# Patient Record
Sex: FEMALE | Race: ASIAN | NOT HISPANIC OR LATINO | URBAN - METROPOLITAN AREA
[De-identification: names, ages, dates, MRNs, and addresses within clinical notes are randomized per-mention and may not be internally consistent; named-entity substitution may affect disease eponyms.]

---

## 2019-09-23 ENCOUNTER — INPATIENT (INPATIENT)
Facility: HOSPITAL | Age: 84
LOS: 10 days | Discharge: HOME | DRG: 280 | End: 2019-10-04
Attending: HOSPITALIST | Admitting: INTERNAL MEDICINE
Payer: MEDICAID

## 2019-09-23 VITALS
HEIGHT: 62 IN | TEMPERATURE: 99 F | RESPIRATION RATE: 24 BRPM | WEIGHT: 136.69 LBS | OXYGEN SATURATION: 97 % | DIASTOLIC BLOOD PRESSURE: 86 MMHG | SYSTOLIC BLOOD PRESSURE: 131 MMHG | HEART RATE: 123 BPM

## 2019-09-23 DIAGNOSIS — I21.4 NON-ST ELEVATION (NSTEMI) MYOCARDIAL INFARCTION: ICD-10-CM

## 2019-09-23 LAB
ALBUMIN SERPL ELPH-MCNC: 4.1 G/DL — SIGNIFICANT CHANGE UP (ref 3.3–5)
ALP SERPL-CCNC: 144 U/L — HIGH (ref 40–120)
ALT FLD-CCNC: 43 U/L — SIGNIFICANT CHANGE UP (ref 10–45)
ANION GAP SERPL CALC-SCNC: 15 MMOL/L — SIGNIFICANT CHANGE UP (ref 5–17)
APTT BLD: 72.3 SEC — HIGH (ref 27.5–36.3)
AST SERPL-CCNC: 57 U/L — HIGH (ref 10–40)
BILIRUB SERPL-MCNC: 0.4 MG/DL — SIGNIFICANT CHANGE UP (ref 0.2–1.2)
BUN SERPL-MCNC: 30 MG/DL — HIGH (ref 7–23)
CALCIUM SERPL-MCNC: 9.2 MG/DL — SIGNIFICANT CHANGE UP (ref 8.4–10.5)
CHLORIDE SERPL-SCNC: 101 MMOL/L — SIGNIFICANT CHANGE UP (ref 96–108)
CK MB BLD-MCNC: 8.7 % — HIGH (ref 0–3.5)
CK MB CFR SERPL CALC: 9 NG/ML — HIGH (ref 0–6.7)
CK SERPL-CCNC: 103 U/L — SIGNIFICANT CHANGE UP (ref 30–200)
CO2 SERPL-SCNC: 18 MMOL/L — LOW (ref 22–31)
CREAT SERPL-MCNC: 1.93 MG/DL — HIGH (ref 0.5–1.3)
GLUCOSE SERPL-MCNC: 136 MG/DL — HIGH (ref 70–99)
HCT VFR BLD CALC: 41 % — SIGNIFICANT CHANGE UP (ref 39–50)
HGB BLD-MCNC: 13 G/DL — SIGNIFICANT CHANGE UP (ref 13–17)
INR BLD: 1.04 RATIO — SIGNIFICANT CHANGE UP (ref 0.88–1.16)
LDH SERPL L TO P-CCNC: 319 U/L — HIGH (ref 50–242)
MCHC RBC-ENTMCNC: 28.2 PG — SIGNIFICANT CHANGE UP (ref 27–34)
MCHC RBC-ENTMCNC: 31.7 GM/DL — LOW (ref 32–36)
MCV RBC AUTO: 89.2 FL — SIGNIFICANT CHANGE UP (ref 80–100)
NT-PROBNP SERPL-SCNC: HIGH PG/ML (ref 0–300)
PLATELET # BLD AUTO: 239 K/UL — SIGNIFICANT CHANGE UP (ref 150–400)
POTASSIUM SERPL-MCNC: 5.1 MMOL/L — SIGNIFICANT CHANGE UP (ref 3.5–5.3)
POTASSIUM SERPL-SCNC: 5.1 MMOL/L — SIGNIFICANT CHANGE UP (ref 3.5–5.3)
PROCALCITONIN SERPL-MCNC: 0.13 NG/ML — HIGH (ref 0.02–0.1)
PROT SERPL-MCNC: 7.8 G/DL — SIGNIFICANT CHANGE UP (ref 6–8.3)
PROTHROM AB SERPL-ACNC: 11.9 SEC — SIGNIFICANT CHANGE UP (ref 10–12.9)
RBC # BLD: 4.6 M/UL — SIGNIFICANT CHANGE UP (ref 4.2–5.8)
RBC # FLD: 13.6 % — SIGNIFICANT CHANGE UP (ref 10.3–14.5)
SODIUM SERPL-SCNC: 134 MMOL/L — LOW (ref 135–145)
TROPONIN T, HIGH SENSITIVITY RESULT: 2121 NG/L — HIGH (ref 0–51)
WBC # BLD: 16.2 K/UL — HIGH (ref 3.8–10.5)
WBC # FLD AUTO: 16.2 K/UL — HIGH (ref 3.8–10.5)

## 2019-09-23 PROCEDURE — 93010 ELECTROCARDIOGRAM REPORT: CPT

## 2019-09-23 PROCEDURE — 71045 X-RAY EXAM CHEST 1 VIEW: CPT | Mod: 26

## 2019-09-23 RX ORDER — HEPARIN SODIUM 5000 [USP'U]/ML
920 INJECTION INTRAVENOUS; SUBCUTANEOUS
Qty: 25000 | Refills: 0 | Status: DISCONTINUED | OUTPATIENT
Start: 2019-09-23 | End: 2019-09-24

## 2019-09-23 RX ORDER — FUROSEMIDE 40 MG
40 TABLET ORAL
Refills: 0 | Status: DISCONTINUED | OUTPATIENT
Start: 2019-09-23 | End: 2019-09-26

## 2019-09-23 RX ORDER — METOPROLOL TARTRATE 50 MG
25 TABLET ORAL
Refills: 0 | Status: DISCONTINUED | OUTPATIENT
Start: 2019-09-23 | End: 2019-09-26

## 2019-09-23 RX ORDER — FUROSEMIDE 40 MG
40 TABLET ORAL ONCE
Refills: 0 | Status: COMPLETED | OUTPATIENT
Start: 2019-09-23 | End: 2019-09-23

## 2019-09-23 RX ORDER — HEPARIN SODIUM 5000 [USP'U]/ML
3800 INJECTION INTRAVENOUS; SUBCUTANEOUS EVERY 6 HOURS
Refills: 0 | Status: DISCONTINUED | OUTPATIENT
Start: 2019-09-23 | End: 2019-09-24

## 2019-09-23 RX ORDER — FUROSEMIDE 40 MG
20 TABLET ORAL ONCE
Refills: 0 | Status: DISCONTINUED | OUTPATIENT
Start: 2019-09-23 | End: 2019-09-23

## 2019-09-23 RX ORDER — ATORVASTATIN CALCIUM 80 MG/1
80 TABLET, FILM COATED ORAL AT BEDTIME
Refills: 0 | Status: DISCONTINUED | OUTPATIENT
Start: 2019-09-23 | End: 2019-10-04

## 2019-09-23 RX ORDER — ASPIRIN/CALCIUM CARB/MAGNESIUM 324 MG
81 TABLET ORAL DAILY
Refills: 0 | Status: DISCONTINUED | OUTPATIENT
Start: 2019-09-23 | End: 2019-10-04

## 2019-09-23 RX ADMIN — ATORVASTATIN CALCIUM 80 MILLIGRAM(S): 80 TABLET, FILM COATED ORAL at 21:20

## 2019-09-23 RX ADMIN — HEPARIN SODIUM 920 UNIT(S)/HR: 5000 INJECTION INTRAVENOUS; SUBCUTANEOUS at 19:49

## 2019-09-23 RX ADMIN — Medication 40 MILLIGRAM(S): at 17:46

## 2019-09-23 RX ADMIN — Medication 25 MILLIGRAM(S): at 21:20

## 2019-09-23 NOTE — H&P CARDIOLOGY - HISTORY OF PRESENT ILLNESS
82 y/o Afghan female (currently resides in [place]) with PMHx of HTN who presented to the ED @ [facility] on 9/22 c/o exertional and non-exertional dyspnea, orthopnea and cough x 3 weeks, worsened 1 day prior to going to the hospital.  At OSH patient CT Chest negative for PE,  EKG without acute findings, Troponin I peak 1.58 ACS ASA/Heparin administered and patient received Plavix 300mg.  Patient also with WINSTON creatinine 2.0 on 9/22 --> 1.9 on 9/23.  TTE showing severe wall motion abnormality, LVEF 20%.  She is now transferred to [facility] in setting of NSTEMI and acute systolic HF for R/L HC.  Patient speaks Faroese only,  phone service deferred.  Patients grandson Enrike Mitchell present at bedside to translate.

## 2019-09-23 NOTE — CONSULT NOTE ADULT - SUBJECTIVE AND OBJECTIVE BOX
Patient seen and evaluated at bedside    Chief Complaint: shortness of breath    HPI:      PMHx:   Essential hypertension      PSHx:   No significant past surgical history      Allergies:  No Known Allergies      Home Meds:    Current Medications:       FAMILY HISTORY:  No pertinent family history in first degree relatives      Social History:  Smoking History:  Alcohol Use:  Drug Use:    REVIEW OF SYSTEMS:  CONSTITUTIONAL: No weakness, fevers or chills  EYES/ENT: No visual changes;  No dysphagia  NECK: No pain or stiffness  RESPIRATORY: No cough, wheezing, hemoptysis; No shortness of breath  CARDIOVASCULAR: No chest pain or palpitations; No lower extremity edema  GASTROINTESTINAL: No abdominal or epigastric pain. No nausea, vomiting, or hematemesis; No diarrhea or constipation. No melena or hematochezia.  BACK: No back pain  GENITOURINARY: No dysuria, frequency or hematuria  NEUROLOGICAL: No numbness or weakness  SKIN: No itching, burning, rashes, or lesions   All other review of systems is negative unless indicated above.    Physical Exam:  T(F): 99.4 (09-23), Max: 99.4 (09-23)  HR: 123 (09-23) (123 - 123)  BP: 131/86 (09-23) (131/86 - 131/86)  RR: 24 (09-23)  SpO2: 97% (09-23)  GENERAL: No acute distress, well-developed  HEAD:  Atraumatic, Normocephalic  ENT: EOMI, PERRLA, conjunctiva and sclera clear, Neck supple, No JVD, moist mucosa  CHEST/LUNG: Clear to auscultation bilaterally; No wheeze, equal breath sounds bilaterally   BACK: No spinal tenderness  HEART: Regular rate and rhythm; No murmurs, rubs, or gallops  ABDOMEN: Soft, Nontender, Nondistended; Bowel sounds present  EXTREMITIES:  No clubbing, cyanosis, or edema  PSYCH: Nl behavior, nl affect  NEUROLOGY: AAOx3, non-focal, cranial nerves intact  SKIN: Normal color, No rashes or lesions  LINES:    Cardiovascular Diagnostic Testing:    ECG: Personally reviewed:    Echo: Personally reviewed:    Stress Testing:    Cath:    Imaging:    CXR: Personally reviewed    Labs: Personally reviewed                              For all Cardiology service contact information, go to [email or web address] and use "[email or web address]" to login. Patient seen and evaluated at bedside    Chief Complaint: shortness of breath    HPI:    82 y/o Palestinian female (currently resides [place]) with PMHx of HTN who presented to the ED @ [facility] on 9/22 c/o exertional and non-exertional dyspnea, orthopnea and cough x 3 weeks, worsened 1 day prior to going to the hospital.  At OSH patient CT Chest negative for PE,  EKG without acute findings, Troponin I peak 1.58 ACS ASA/Heparin administered and patient received Plavix 300mg.  Patient also with WINSTON creatinine 2.0 on 9/22 --> 1.9 on 9/23.  TTE showing severe wall motion abnormality, LVEF 20%.  She is now transferred to [facility] in setting of NSTEMI and acute systolic HF for R/L HC.  Patient speaks Albanian only,  phone service deferred.  Patients grandson Enrike Mitchell present at bedside to translate.    On further questioning she reports that her symptoms have been ongoing for 3 months.  She also has a dry cough and poor appetite.  Only on amlodipine, no medications for her CHF.  Family report she had CT PE protocol that was negative.  I was called as patient was dyspneic and on 6l of oxygen.  I immediately came to evaluate her and found that she was successfully weaned down to 2 l    PMHx:   Essential hypertension      PSHx:   No significant past surgical history      Allergies:  No Known Allergies      Home Meds:    Current Medications:       FAMILY HISTORY:  No pertinent family history in first degree relatives      Social History:  Smoking History:  Alcohol Use:  Drug Use:    REVIEW OF SYSTEMS:  CONSTITUTIONAL: No weakness, fevers or chills  EYES/ENT: No visual changes;  No dysphagia  NECK: No pain or stiffness  RESPIRATORY: No cough, wheezing, hemoptysis; No shortness of breath  CARDIOVASCULAR: No chest pain or palpitations; No lower extremity edema  GASTROINTESTINAL: No abdominal or epigastric pain. No nausea, vomiting, or hematemesis; No diarrhea or constipation. No melena or hematochezia.  BACK: No back pain  GENITOURINARY: No dysuria, frequency or hematuria  NEUROLOGICAL: No numbness or weakness  SKIN: No itching, burning, rashes, or lesions   All other review of systems is negative unless indicated above.    Physical Exam:  T(F): 99.4 (09-23), Max: 99.4 (09-23)  HR: 123 (09-23) (123 - 123)  BP: 131/86 (09-23) (131/86 - 131/86)  RR: 24 (09-23)  SpO2: 97% (09-23)  GENERAL: No acute distress, well-developed  HEAD:  Atraumatic, Normocephalic  ENT: EOMI, PERRLA, conjunctiva and sclera clear, Neck supple, No JVD, moist mucosa  CHEST/LUNG: Clear to auscultation bilaterally; No wheeze, equal breath sounds bilaterally   BACK: No spinal tenderness  HEART: Regular rate and rhythm; No murmurs, rubs, or gallops  ABDOMEN: Soft, Nontender, Nondistended; Bowel sounds present  EXTREMITIES:  No clubbing, cyanosis, or edema  PSYCH: Nl behavior, nl affect  NEUROLOGY: AAOx3, non-focal, cranial nerves intact  SKIN: Normal color, No rashes or lesions  LINES:    Cardiovascular Diagnostic Testing:    ECG: Personally reviewed:    Echo: Personally reviewed:    Stress Testing:    Cath:    Imaging:    CXR: Personally reviewed    Labs: Personally reviewed                              For all Cardiology service contact information, go to [email or web address] and use "[email or web address]" to login. Patient seen and evaluated at bedside    Chief Complaint: shortness of breath    HPI:    82 y/o [place]an female (currently resides [place]) with PMHx of HTN who presented to the ED @ [facility] on 9/22 c/o exertional and non-exertional dyspnea, orthopnea and cough x 3 weeks, worsened 1 day prior to going to the hospital.  At OSH patient CT Chest negative for PE,  EKG without acute findings, Troponin I peak 1.58 ACS ASA/Heparin administered and patient received Plavix 300mg.  Patient also with WINSTON creatinine 2.0 on 9/22 --> 1.9 on 9/23.  TTE showing severe wall motion abnormality, LVEF 20%.  She is now transferred to [facility] in setting of NSTEMI and acute systolic HF for R/L HC.  Patient speaks Bulgarian only,  phone service deferred.  Patients grandson Enrike Mitchell present at bedside to translate.    On further questioning she reports that her symptoms have been ongoing for 3 months.  She also has a dry cough and poor appetite.  Only on amlodipine, no medications for her CHF.  Family report she had CT PE protocol that was negative.  I was called as patient was dyspneic and on 6l of oxygen.  I immediately came to evaluate her and found that she was successfully weaned down to 2 lpm and resting comfortably.  She was given 60 mg IV lasix (total).      PMHx:   Essential hypertension      PSHx:   No significant past surgical history      Allergies:  No Known Allergies      Home Meds:  amlo 5 mg    FAMILY HISTORY:  No pertinent family history in first degree relatives      denies smoking/alcohol/drugs  lives in [place], visiting family    REVIEW OF SYSTEMS:  CONSTITUTIONAL: No fevers or chills  EYES/ENT: No dysphagia  NECK: No pain or stiffness  RESPIRATORY: + cough, see hpi  CARDIOVASCULAR: No chest pain or palpitations; No lower extremity edema  GASTROINTESTINAL: No abdominal or epigastric pain. No nausea, vomiting, or hematemesis; No diarrhea or constipation. No melena or hematochezia.  BACK: No back pain  GENITOURINARY: No dysuria, frequency or hematuria  NEUROLOGICAL: No numbness or weakness  SKIN: No itching, burning, rashes, or lesions   All other review of systems is negative unless indicated above.    Physical Exam:  T(F): 99.4 (09-23), Max: 99.4 (09-23)  HR: 123 (09-23) (123 - 123)  BP: 131/86 (09-23) (131/86 - 131/86)  RR: 24 (09-23)  SpO2: 97% (09-23)  GENERAL: No acute distress, well-developed  HEAD:  Atraumatic, Normocephalic  ENT: EOMI, PERRLA, conjunctiva and sclera clear, Neck supple, No JVD, moist mucosa  CHEST/LUNG: Clear to auscultation bilaterally; No wheeze, equal breath sounds bilaterally   BACK: No spinal tenderness  HEART: Regular rate and rhythm; No murmurs, rubs, or gallops  ABDOMEN: Soft, Nontender, Nondistended; Bowel sounds present  EXTREMITIES:  No clubbing, cyanosis, or edema  PSYCH: Nl behavior, nl affect  NEUROLOGY: AAOx3, non-focal, cranial nerves intact  SKIN: Normal color, No rashes or lesions  LINES:    Cardiovascular Diagnostic Testing:    ECG: Personally reviewed:    Echo: Personally reviewed:    Stress Testing:    Cath:    Imaging:    CXR: Personally reviewed    Labs: Personally reviewed                              For all Cardiology service contact information, go to [email or web address] and use "[email or web address]" to login. Patient seen and evaluated at bedside    Chief Complaint: shortness of breath    HPI:    82 y/o Taiwanese female (currently resides [place]) with PMHx of HTN who presented to the ED @ [facility] on 9/22 c/o exertional and non-exertional dyspnea, orthopnea and cough x 3 weeks, worsened 1 day prior to going to the hospital.  At OSH patient CT Chest negative for PE,  EKG without acute findings, Troponin I peak 1.58 ACS ASA/Heparin administered and patient received Plavix 300mg.  Patient also with WINSTON creatinine 2.0 on 9/22 --> 1.9 on 9/23.  TTE showing severe wall motion abnormality, LVEF 20%.  She is now transferred to [facility] in setting of NSTEMI and acute systolic HF for R/L HC.  Patient speaks Romanian only,  phone service deferred.  Patients grandson Enrike Mitchell present at bedside to translate.    On further questioning she reports that her symptoms have been ongoing for 3 months.  She also has a dry cough and poor appetite.  Only on amlodipine, no medications for her CHF.  Family report she had CT PE protocol that was negative.  I was called as patient was dyspneic and on 6l of oxygen.  I immediately came to evaluate her and found that she was successfully weaned down to 2 lpm and resting comfortably.  She was given 60 mg IV lasix (total).      PMHx:   Essential hypertension      PSHx:   No significant past surgical history      Allergies:  No Known Allergies      Home Meds:  amlo 5 mg    FAMILY HISTORY:  No pertinent family history in first degree relatives      denies smoking/alcohol/drugs  lives in [place], visiting family    REVIEW OF SYSTEMS:  CONSTITUTIONAL: No fevers or chills  EYES/ENT: No dysphagia  NECK: No pain or stiffness  RESPIRATORY: + cough, see hpi  CARDIOVASCULAR: No chest pain or palpitations  GASTROINTESTINAL: early satiety  BACK: No back pain  GENITOURINARY: No dysuria, frequency or hematuria  NEUROLOGICAL: No numbness or weakness  SKIN: No itching, burning, rashes, or lesions   All other review of systems is negative unless indicated above.    Physical Exam:  T(F): 99.4 (09-23), Max: 99.4 (09-23)  HR: 123 (09-23) (123 - 123)  BP: 131/86 (09-23) (131/86 - 131/86)  RR: 24 (09-23)  SpO2: 97% (09-23)  GENERAL: No acute distress, on 2lpm nasal cannula  HEAD:  Atraumatic, Normocephalic  ENT: EOMI, PERRLA, conjunctiva and sclera clear, Neck supple, + JVD 14 cm H20  CHEST/LUNG: crackles bilaterally  BACK: No spinal tenderness  HEART: elevated rate, reg rhythm, s1 and s2, systolic murmur on upper sternal border  ABDOMEN: Soft, Nontender, Nondistended; Bowel sounds present  EXTREMITIES:  No clubbing, cyanosis, or edema  PSYCH: Nl behavior, nl affect  NEUROLOGY:  non-focal, cranial nerves intact  SKIN: Normal color, No rashes or lesions      Cardiovascular Diagnostic Testing:    ECG: , RAA, incomplete RBBB, T wave inversions in V1-V4    Echo: pending, per [facility] has segmental defects and reduced EF 20-30%      Imaging:    CXR: Personally reviewed: dependent pulmonary congestion    Labs: pending, troponins peaked out outside hospital and trending down per family          Assessment and Plan:    82 y/o Taiwanese female (currently resides in [place] in the  visiting family) with PMHx of HTN who presented to the ED @ [facility] on 9/22 for ongoing dyspnea noted to have acute on chronic systolic heart failure and NSTEMI, transferred here for LHC/RHC evaluation.  Patient appears stable and not requiring CCU level care at this time.  Monitor on tele unit and diurese.  Once patient is euvolemic can proceed with LHC to determine etiology of her heart failure.    Acute on Chronic Systolic Heart Failure  - agree with IV lasix, would add low dose hydralazine 12.5 q8h for afterload reduction  - agree with metoprolol  - will need to be started on lisinopril prior to discharge (likely unable to continue entresto in [place])  - in house TTE pending  - infectious work up ongoing, would defer antibiotics for now    NSTEMI, most likely demand ischemia in setting of CHF  - given plavix only 300, asa, plavix, and heparin gtt  - plavix is lower dose however low suspicion for primary ACS, will not reload  - continue to monitor ekg, trop, tele  - possible LHC/RHC once patient is euvolemic                  For all Cardiology service contact information, go to [email or web address] and use "[email or web address]" to login.

## 2019-09-23 NOTE — PROVIDER CONTACT NOTE (CRITICAL VALUE NOTIFICATION) - ACTION/TREATMENT ORDERED:
Dr. Wick will re-evaluate in the morning for cardiac cath as per TK Rose. Will continue to monitor pt closely.

## 2019-09-23 NOTE — CHART NOTE - NSCHARTNOTEFT_GEN_A_CORE
I came to re-evaluate Ms Santana at 11:45 pm.  She has had over 1 L net negative fluid output after lasix dose.    She is feeling much better.  Shortness of breath and cough have improved dramatically.    Her HR is 81, and /74, now weaned off of Oxygen and saturating 96%.    She will be getting another IV dose of lasix in am.      Plan for TTE in am.    If remains stable off of oxygen, will be stable for [facility].    Jody Lezama MD  Cardiology Fellow   For all other Cardiology service contact information, go to [email or web address] and use "[email or web address]" to login.

## 2019-09-23 NOTE — H&P CARDIOLOGY - EKG AND INTERPRETATION
ST @ 124 bpm, incomplete RBBB, q-waves inferiorly, anteriorly, laterally, non-specific st-t wave abnormality

## 2019-09-24 DIAGNOSIS — Z29.9 ENCOUNTER FOR PROPHYLACTIC MEASURES, UNSPECIFIED: ICD-10-CM

## 2019-09-24 DIAGNOSIS — D72.829 ELEVATED WHITE BLOOD CELL COUNT, UNSPECIFIED: ICD-10-CM

## 2019-09-24 DIAGNOSIS — I50.20 UNSPECIFIED SYSTOLIC (CONGESTIVE) HEART FAILURE: ICD-10-CM

## 2019-09-24 DIAGNOSIS — I10 ESSENTIAL (PRIMARY) HYPERTENSION: ICD-10-CM

## 2019-09-24 DIAGNOSIS — I21.4 NON-ST ELEVATION (NSTEMI) MYOCARDIAL INFARCTION: ICD-10-CM

## 2019-09-24 DIAGNOSIS — R79.89 OTHER SPECIFIED ABNORMAL FINDINGS OF BLOOD CHEMISTRY: ICD-10-CM

## 2019-09-24 LAB
ANION GAP SERPL CALC-SCNC: 17 MMOL/L — SIGNIFICANT CHANGE UP (ref 5–17)
APPEARANCE UR: CLEAR — SIGNIFICANT CHANGE UP
APTT BLD: 57 SEC — HIGH (ref 27.5–36.3)
APTT BLD: 83.8 SEC — HIGH (ref 27.5–36.3)
BACTERIA # UR AUTO: NEGATIVE — SIGNIFICANT CHANGE UP
BILIRUB UR-MCNC: NEGATIVE — SIGNIFICANT CHANGE UP
BUN SERPL-MCNC: 34 MG/DL — HIGH (ref 7–23)
CALCIUM SERPL-MCNC: 9.2 MG/DL — SIGNIFICANT CHANGE UP (ref 8.4–10.5)
CHLORIDE SERPL-SCNC: 99 MMOL/L — SIGNIFICANT CHANGE UP (ref 96–108)
CK MB BLD-MCNC: 6.7 % — HIGH (ref 0–3.5)
CK MB CFR SERPL CALC: 9.1 NG/ML — HIGH (ref 0–6.7)
CK SERPL-CCNC: 135 U/L — SIGNIFICANT CHANGE UP (ref 30–200)
CO2 SERPL-SCNC: 19 MMOL/L — LOW (ref 22–31)
COLOR SPEC: COLORLESS — SIGNIFICANT CHANGE UP
CREAT SERPL-MCNC: 2.04 MG/DL — HIGH (ref 0.5–1.3)
DIFF PNL FLD: ABNORMAL
EPI CELLS # UR: 1 /HPF — SIGNIFICANT CHANGE UP
GLUCOSE SERPL-MCNC: 117 MG/DL — HIGH (ref 70–99)
GLUCOSE UR QL: NEGATIVE — SIGNIFICANT CHANGE UP
HCT VFR BLD CALC: 42.3 % — SIGNIFICANT CHANGE UP (ref 39–50)
HCT VFR BLD CALC: 42.3 % — SIGNIFICANT CHANGE UP (ref 39–50)
HGB BLD-MCNC: 13.2 G/DL — SIGNIFICANT CHANGE UP (ref 13–17)
HGB BLD-MCNC: 13.3 G/DL — SIGNIFICANT CHANGE UP (ref 13–17)
HYALINE CASTS # UR AUTO: 1 /LPF — SIGNIFICANT CHANGE UP (ref 0–2)
KETONES UR-MCNC: NEGATIVE — SIGNIFICANT CHANGE UP
LEUKOCYTE ESTERASE UR-ACNC: NEGATIVE — SIGNIFICANT CHANGE UP
MCHC RBC-ENTMCNC: 27.9 PG — SIGNIFICANT CHANGE UP (ref 27–34)
MCHC RBC-ENTMCNC: 28.2 PG — SIGNIFICANT CHANGE UP (ref 27–34)
MCHC RBC-ENTMCNC: 31.3 GM/DL — LOW (ref 32–36)
MCHC RBC-ENTMCNC: 31.5 GM/DL — LOW (ref 32–36)
MCV RBC AUTO: 89.1 FL — SIGNIFICANT CHANGE UP (ref 80–100)
MCV RBC AUTO: 89.6 FL — SIGNIFICANT CHANGE UP (ref 80–100)
NITRITE UR-MCNC: NEGATIVE — SIGNIFICANT CHANGE UP
PH UR: 6 — SIGNIFICANT CHANGE UP (ref 5–8)
PLATELET # BLD AUTO: 255 K/UL — SIGNIFICANT CHANGE UP (ref 150–400)
PLATELET # BLD AUTO: 263 K/UL — SIGNIFICANT CHANGE UP (ref 150–400)
POTASSIUM SERPL-MCNC: 4.6 MMOL/L — SIGNIFICANT CHANGE UP (ref 3.5–5.3)
POTASSIUM SERPL-SCNC: 4.6 MMOL/L — SIGNIFICANT CHANGE UP (ref 3.5–5.3)
PROT UR-MCNC: SIGNIFICANT CHANGE UP
RBC # BLD: 4.72 M/UL — SIGNIFICANT CHANGE UP (ref 4.2–5.8)
RBC # BLD: 4.75 M/UL — SIGNIFICANT CHANGE UP (ref 4.2–5.8)
RBC # FLD: 13.5 % — SIGNIFICANT CHANGE UP (ref 10.3–14.5)
RBC # FLD: 13.6 % — SIGNIFICANT CHANGE UP (ref 10.3–14.5)
RBC CASTS # UR COMP ASSIST: 3 /HPF — SIGNIFICANT CHANGE UP (ref 0–4)
SODIUM SERPL-SCNC: 135 MMOL/L — SIGNIFICANT CHANGE UP (ref 135–145)
SP GR SPEC: 1.01 — SIGNIFICANT CHANGE UP (ref 1.01–1.02)
TROPONIN T, HIGH SENSITIVITY RESULT: 3123 NG/L — HIGH (ref 0–51)
UROBILINOGEN FLD QL: NEGATIVE — SIGNIFICANT CHANGE UP
WBC # BLD: 19.9 K/UL — HIGH (ref 3.8–10.5)
WBC # BLD: 20.5 K/UL — HIGH (ref 3.8–10.5)
WBC # FLD AUTO: 19.9 K/UL — HIGH (ref 3.8–10.5)
WBC # FLD AUTO: 20.5 K/UL — HIGH (ref 3.8–10.5)
WBC UR QL: 4 /HPF — SIGNIFICANT CHANGE UP (ref 0–5)

## 2019-09-24 PROCEDURE — 99252 IP/OBS CONSLTJ NEW/EST SF 35: CPT

## 2019-09-24 PROCEDURE — 99223 1ST HOSP IP/OBS HIGH 75: CPT | Mod: GC

## 2019-09-24 PROCEDURE — 93880 EXTRACRANIAL BILAT STUDY: CPT | Mod: 26

## 2019-09-24 PROCEDURE — ZZZZZ: CPT

## 2019-09-24 PROCEDURE — 93306 TTE W/DOPPLER COMPLETE: CPT | Mod: 26

## 2019-09-24 PROCEDURE — 93460 R&L HRT ART/VENTRICLE ANGIO: CPT | Mod: 26,GC

## 2019-09-24 RX ORDER — HEPARIN SODIUM 5000 [USP'U]/ML
INJECTION INTRAVENOUS; SUBCUTANEOUS
Qty: 25000 | Refills: 0 | Status: DISCONTINUED | OUTPATIENT
Start: 2019-09-24 | End: 2019-09-25

## 2019-09-24 RX ORDER — CLOPIDOGREL BISULFATE 75 MG/1
300 TABLET, FILM COATED ORAL ONCE
Refills: 0 | Status: DISCONTINUED | OUTPATIENT
Start: 2019-09-24 | End: 2019-09-24

## 2019-09-24 RX ORDER — CLOPIDOGREL BISULFATE 75 MG/1
75 TABLET, FILM COATED ORAL DAILY
Refills: 0 | Status: DISCONTINUED | OUTPATIENT
Start: 2019-09-24 | End: 2019-09-24

## 2019-09-24 RX ORDER — HEPARIN SODIUM 5000 [USP'U]/ML
3800 INJECTION INTRAVENOUS; SUBCUTANEOUS EVERY 6 HOURS
Refills: 0 | Status: DISCONTINUED | OUTPATIENT
Start: 2019-09-24 | End: 2019-09-25

## 2019-09-24 RX ADMIN — Medication 25 MILLIGRAM(S): at 18:44

## 2019-09-24 RX ADMIN — Medication 25 MILLIGRAM(S): at 06:33

## 2019-09-24 RX ADMIN — HEPARIN SODIUM 0 UNIT(S)/HR: 5000 INJECTION INTRAVENOUS; SUBCUTANEOUS at 02:57

## 2019-09-24 RX ADMIN — Medication 81 MILLIGRAM(S): at 12:21

## 2019-09-24 RX ADMIN — HEPARIN SODIUM 820 UNIT(S)/HR: 5000 INJECTION INTRAVENOUS; SUBCUTANEOUS at 09:59

## 2019-09-24 RX ADMIN — HEPARIN SODIUM 750 UNIT(S)/HR: 5000 INJECTION INTRAVENOUS; SUBCUTANEOUS at 18:45

## 2019-09-24 RX ADMIN — ATORVASTATIN CALCIUM 80 MILLIGRAM(S): 80 TABLET, FILM COATED ORAL at 21:12

## 2019-09-24 RX ADMIN — Medication 40 MILLIGRAM(S): at 18:44

## 2019-09-24 RX ADMIN — HEPARIN SODIUM 820 UNIT(S)/HR: 5000 INJECTION INTRAVENOUS; SUBCUTANEOUS at 03:30

## 2019-09-24 RX ADMIN — Medication 40 MILLIGRAM(S): at 06:33

## 2019-09-24 NOTE — H&P ADULT - PROBLEM SELECTOR PLAN 5
Per chart review, patient on amlodipine 5 mg q Day at home  - Will hold off for now as patient is normotensive

## 2019-09-24 NOTE — H&P ADULT - PROBLEM SELECTOR PLAN 1
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  - Will proceed with pre-op workup including P2Y12 for plavix activity and medical optimization per cardiology  - Heparin gtt given substantial triple vessel disease, per cardiology recommendations  - ASA 40 mg q Day  - Atorvastatin 80 mg q Day  - Metoprolol tartrate 25 mg po BID  - Holding Plavix i/s/o possible CABG. 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  - Will proceed with pre-op workup including P2Y12 for plavix activity and medical optimization per cardiology  - Heparin gtt given substantial triple vessel disease, per cardiology recommendations  - ASA 40 mg q Day  - Atorvastatin 80 mg q Day  - Metoprolol tartrate 25 mg po BID  - Holding Plavix i/s/o possible CABG. 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  - Will proceed with pre-op workup including P2Y12 for plavix activity and medical optimization per cardiology  - Heparin gtt given substantial triple vessel disease, per cardiology recommendations  - ASA 81mg q Day  - Atorvastatin 80 mg q Day  - Metoprolol tartrate 25 mg po BID  - Holding Plavix i/s/o possible CABG. Continue to trend P2Y12 levels  - Lipid, TSH, and A1c with am labs  -Trend trops.   -C/w telemetry.

## 2019-09-24 NOTE — H&P ADULT - NSHPPHYSICALEXAM_GEN_ALL_CORE
Vital Signs Last 24 Hrs  T(C): 36.3 (24 Sep 2019 12:00), Max: 36.8 (23 Sep 2019 19:37)  T(F): 97.4 (24 Sep 2019 12:00), Max: 98.3 (23 Sep 2019 19:37)  HR: 97 (24 Sep 2019 18:00) (84 - 102)  BP: 143/99 (24 Sep 2019 18:00) (118/74 - 146/96)  BP(mean): --  RR: 17 (24 Sep 2019 18:00) (16 - 18)  SpO2: 96% (24 Sep 2019 18:00) (95% - 99%) Vital Signs Last 24 Hrs  T(C): 36.3 (24 Sep 2019 12:00), Max: 36.8 (23 Sep 2019 19:37)  T(F): 97.4 (24 Sep 2019 12:00), Max: 98.3 (23 Sep 2019 19:37)  HR: 97 (24 Sep 2019 18:00) (84 - 102)  BP: 143/99 (24 Sep 2019 18:00) (118/74 - 146/96)  BP(mean): --  RR: 17 (24 Sep 2019 18:00) (16 - 18)  SpO2: 96% (24 Sep 2019 18:00) (95% - 99%)    PHYSICAL EXAM:  GENERAL: NAD, well-developed  HEAD:  Atraumatic, Normocephalic  EYES: EOMI, PERRLA, conjunctiva and sclera clear  NECK: Supple, No JVD  CHEST/LUNG: Clear to auscultation bilaterally; No wheeze  HEART: Regular rate and rhythm; No murmurs, rubs, or gallops  ABDOMEN: Soft, Nontender, Nondistended; Bowel sounds present  EXTREMITIES:  2+ Peripheral Pulses, No clubbing, cyanosis, or edema  PSYCH: AAOx3  NEUROLOGY: non-focal  SKIN: No rashes or lesions

## 2019-09-24 NOTE — PROGRESS NOTE ADULT - SUBJECTIVE AND OBJECTIVE BOX
Patient is a 83y old  Male who presents with a chief complaint of Shortness of breath (23 Sep 2019 18:11)          Allergies    No Known Allergies    Intolerances        Medications:  aspirin enteric coated 81 milliGRAM(s) Oral daily  atorvastatin 80 milliGRAM(s) Oral at bedtime  furosemide   Injectable 40 milliGRAM(s) IV Push two times a day  heparin  Infusion. 920 Unit(s)/Hr IV Continuous <Continuous>  heparin  Injectable 3800 Unit(s) IV Push every 6 hours PRN  metoprolol tartrate 25 milliGRAM(s) Oral two times a day      Vitals:  T(C): 36.8 (19 @ 19:37), Max: 37.4 (19 @ 17:15)  HR: 100 (19 @ 21:20) (100 - 124)  BP: 118/74 (19 @ 21:20) (118/74 - 135/95)  BP(mean): 101 (19 @ 17:35) (101 - 101)  RR: 18 (19 @ 21:20) (18 - 24)  SpO2: 97% (19 @ 21:20) (97% - 98%)  Wt(kg): --  Daily Height in cm: 162.56 (23 Sep 2019 17:35)    Daily Weight in k (23 Sep 2019 17:35)  I&O's Summary    23 Sep 2019 07:01  -  24 Sep 2019 01:58  --------------------------------------------------------  IN: 140 mL / OUT: 1400 mL / NET: -1260 mL          Physical Exam:  Appearance: Normal  Eyes: PERRL, EOMI  HENT: Normal oral muscosa, NC/AT  Cardiovascular: S1S2, RRR, No M/R/G, no JVD, No Lower extremity edema  Procedural Access Site: No hematoma, Non-tender to palpation, 2+ pulse, No bruit, No Ecchymosis  Respiratory: Clear to auscultation bilaterally  Gastrointestinal: Soft, Non tender, Normal Bowel Sounds  Musculoskeletal: No clubbing, No joint deformity   Neurologic: Non-focal  Lymphatic: No lymphadenopathy  Psychiatry: AAOx3, Mood & affect appropriate  Skin: No rashes, No ecchymoses, No cyanosis        134<L>  |  101  |  30<H>  ----------------------------<  136<H>  5.1   |  18<L>  |  1.93<H>    Ca    9.2      23 Sep 2019 18:45    TPro  7.8  /  Alb  4.1  /  TBili  0.4  /  DBili  x   /  AST  57<H>  /  ALT  43  /  AlkPhos  144<H>      PT/INR - ( 23 Sep 2019 18:45 )   PT: 11.9 sec;   INR: 1.04 ratio         PTT - ( 23 Sep 2019 18:45 )  PTT:72.3 sec  CARDIAC MARKERS ( 23 Sep 2019 18:45 )  x     / x     / 103 U/L / x     / 9.0 ng/mL      Serum Pro-Brain Natriuretic Peptide: 89978 pg/mL ( @ 18:45)    Lipid panel   Hgb A1c                         13.0   16.2  )-----------( 239      ( 23 Sep 2019 18:45 )             41.0         ECG:  bpm    Echo: ordered    Cath: pending    Imaging:     Interpretation of Telemetry:

## 2019-09-24 NOTE — H&P ADULT - NSHPLABSRESULTS_GEN_ALL_CORE
The Labs were reviewed by me   The Radiology was reviewed by me    EKG tracing reviewed by me    09-24    135  |  99  |  34<H>  ----------------------------<  117<H>  4.6   |  19<L>  |  2.04<H>  09-23    134<L>  |  101  |  30<H>  ----------------------------<  136<H>  5.1   |  18<L>  |  1.93<H>    Ca    9.2      24 Sep 2019 02:39  Ca    9.2      23 Sep 2019 18:45    TPro  7.8  /  Alb  4.1  /  TBili  0.4  /  DBili  x   /  AST  57<H>  /  ALT  43  /  AlkPhos  144<H>  09-23          PT/INR - ( 23 Sep 2019 18:45 )   PT: 11.9 sec;   INR: 1.04 ratio         PTT - ( 24 Sep 2019 09:29 )  PTT:57.0 sec                                        13.3   20.5  )-----------( 255      ( 24 Sep 2019 11:20 )             42.3                         13.2   19.9  )-----------( 263      ( 24 Sep 2019 01:59 )             42.3                         13.0   16.2  )-----------( 239      ( 23 Sep 2019 18:45 )             41.0     CAPILLARY BLOOD GLUCOSE The Labs were reviewed by me   The Radiology was reviewed by me    EKG tracing reviewed by me    09-24    135  |  99  |  34<H>  ----------------------------<  117<H>  4.6   |  19<L>  |  2.04<H>  09-23    134<L>  |  101  |  30<H>  ----------------------------<  136<H>  5.1   |  18<L>  |  1.93<H>    Ca    9.2      24 Sep 2019 02:39  Ca    9.2      23 Sep 2019 18:45    TPro  7.8  /  Alb  4.1  /  TBili  0.4  /  DBili  x   /  AST  57<H>  /  ALT  43  /  AlkPhos  144<H>  09-23          PT/INR - ( 23 Sep 2019 18:45 )   PT: 11.9 sec;   INR: 1.04 ratio         PTT - ( 24 Sep 2019 09:29 )  PTT:57.0 sec                                        13.3   20.5  )-----------( 255      ( 24 Sep 2019 11:20 )             42.3                         13.2   19.9  )-----------( 263      ( 24 Sep 2019 01:59 )             42.3                         13.0   16.2  )-----------( 239      ( 23 Sep 2019 18:45 )             41.0     CAPILLARY BLOOD GLUCOSE    < from: TTE with Doppler (w/Cont) (09.24.19 @ 14:54) >    Conclusions:  Endocardial visualization enhanced with intravenous  injection of Ultrasonic Enhancing Agent (Definity).  Severely reduced left ventricular systolic function.  Akinesis ofthe inferolateral wall. Large area of apical  akinesis.    < end of copied text >    < from: Xray Chest 1 View- PORTABLE-Urgent (09.23.19 @ 18:34) >    IMPRESSION:   Trace bilateral pleural effusions. Otherwise clear lungs.    < end of copied text >

## 2019-09-24 NOTE — H&P ADULT - PROBLEM SELECTOR PLAN 2
Echo showing Severely reduced left ventricular systolic function.  Akinesis of the inferolateral wall. Large area of apical  akinesis, s/p 2 L diuresis  - Lasix 40 mg IV BID  - Daily standing weights -Presented with acute systolic heart failure. Unclear chronicity.   Echo showing Severely reduced left ventricular systolic function.  Akinesis of the inferolateral wall. Large area of apical  akinesis, s/p 2 L diuresis  - Lasix 40 mg IV BID  - Daily standing weights and strict I's/O's.  -Consider afterload reducing agent.

## 2019-09-24 NOTE — H&P ADULT - ASSESSMENT
82 y/o [place] female (currently resides in [place] in the  visiting family) with PMHx of HTN who presented to the ED @ [facility] on 9/22 for ongoing dyspnea noted to have acute on chronic systolic heart failure and NSTEMI, transferred here for LHC/RHC evaluation, found to have triple vessel disease.

## 2019-09-24 NOTE — CONSULT NOTE ADULT - SUBJECTIVE AND OBJECTIVE BOX
History of Present Illness:    84 y/o [place] female (currently resides in [place] in the  visiting family) with PMHx of HTN who presented to the ED @ [facility] on 9/22 c/o exertional and non-exertional dyspnea, orthopnea and cough x 3 weeks, worsened 1 day prior to going to the hospital.  At OSH patient CT Chest negative for PE,  EKG without acute findings, Troponin I peak 1.58 ACS ASA/Heparin administered and patient received Plavix 300mg.  Patient also with WINSTON creatinine 2.0 on 9/22 --> 1.9 on 9/23.  TTE showing severe wall motion abnormality, LVEF transferred to [facility] in setting of NSTEMI and heart failure> cath revealed triple vessel CAD.  Patient speaks Fadi only,  phone [phone]      Past Medical History  Essential hypertension  HTN (hypertension)      Past Surgical History  No significant past surgical history  No significant past surgical history      MEDICATIONS  (STANDING):  aspirin enteric coated 81 milliGRAM(s) Oral daily  atorvastatin 80 milliGRAM(s) Oral at bedtime  furosemide   Injectable 40 milliGRAM(s) IV Push two times a day  heparin  Infusion. 920 Unit(s)/Hr (9.2 mL/Hr) IV Continuous <Continuous>  metoprolol tartrate 25 milliGRAM(s) Oral two times a day    MEDICATIONS  (PRN):  heparin  Injectable 3800 Unit(s) IV Push every 6 hours PRN For aPTT less than 40    Antiplatelet therapy:     Plavix  75  mg this am...Plavix 300 mg x 1 this am      Allergies: No Known Allergies      SOCIAL HISTORY:  Smoker: [ ] Yes  [x ] No        PACK YEARS:                         WHEN QUIT?  ETOH use: [ ] Yes  [x ] No              FREQUENCY / QUANTITY:  Ilicit Drug use:  [ ] Yes  [x ] No  Live with: Visiting from [facility]  Assist device use: none    Relevant Family History  FAMILY HISTORY:  No pertinent family history in first degree relatives  No pertinent family history in first degree relatives      Review of Systems  GENERAL:  Fevers[] chills[] sweats[] fatigue[x] weight loss[] weight gain []                                        NEURO:  parathesias[] seizures []  syncope []  confusion []                                                                                  EYES: glasses[]  blurry vision[]  discharge[] pain[] glaucoma []                                                                            ENMT:  difficulty hearing []  vertigo[]  dysphagia[] epistaxis[] recent dental work []                                      CV:  chest pain[x] palpitations[] IBARRA [] diaphoresis [] edema[]                                                                                             RESPIRATORY:  wheezing[] SOB[x] cough [x] sputum[] hemoptysis[]                                                                    GI:  nausea[]  vomiting []  diarrhea[] constipation [] melena []                                                                        : hematuria[ ]  dysuria[ ] urgency[] incontinence[]                                                                                              MUSKULOSKELETAL:  arthritis[ ]  joint swelling [ ] muscle weakness [ ]                                                                  SKIN/BREAST:  rash[ ] itching [ ]  hair loss[ ] masses[ ]                                                                                                PSYCH:  dementia [ ] depresion [ ] anxiety[ ]                                                                                                                  HEME/LYMPH:  bruises easily[ ] enlarged lymph nodes[ ] tender lymph nodes[ ]                                                 ENDOCRINE:  cold intolerance[ ] heat intolerance[ ] polydipsia[ ]                                                                              PHYSICAL EXAM  Vital Signs Last 24 Hrs  T(C): 36.3 (24 Sep 2019 12:00), Max: 37.4 (23 Sep 2019 17:15)  T(F): 97.4 (24 Sep 2019 12:00), Max: 99.4 (23 Sep 2019 17:15)  HR: 96 (24 Sep 2019 14:45) (84 - 124)  BP: 132/92 (24 Sep 2019 14:45) (118/74 - 135/95)  BP(mean): 101 (23 Sep 2019 17:35) (101 - 101)  RR: 17 (24 Sep 2019 14:45) (16 - 24)  SpO2: 97% (24 Sep 2019 14:45) (95% - 99%)    General: Well nourished, well developed, no acute distress.                                                         Neuro: Normal exam oriented to person/place & time with no focal motor or sensory  deficits.                    Eyes: Normal exam of conjunctiva & lids, pupils equally reactive.   ENT: Normal exam of nasal/oral mucosa with absence of cyanosis.   Neck: Normal exam of jugular veins, trachea & thyroid.   Chest: Normal lung exam with good air movement absence of wheezes, rales, or rhonchi:                                                                          CV:  Auscultation: normal [x ] S3[ ] S4[ ] Irregular [ ] Rub[ ] Clicks[ ]  Murmurs none:[ ]systolic [ ]  diastolic [ ] holosystolic [ ]  Carotids: No Bruits[x ] Other____________ Abdominal Aorta: normal [x ] nonpalpable[ ]                                                                         GI: Normal exam of abdomen, liver & spleen with no noted masses or tenderness.                                                                                              Extremities: Normal no evidence of cyanosis or deformity Edema: none[ ]trace[x ]1+[ ]2+[ ]3+[ ]4+[ ]  Lower Extremity Pulses: Right[ ] Left[ ]Varicosities[ ]  SKIN : Normal exam to inspection & palpation.                                                           LABS:                        13.3   20.5  )-----------( 255      ( 24 Sep 2019 11:20 )             42.3     09-24    135  |  99  |  34<H>  ----------------------------<  117<H>  4.6   |  19<L>  |  2.04<H>    Ca    9.2      24 Sep 2019 02:39    TPro  7.8  /  Alb  4.1  /  TBili  0.4  /  DBili  x   /  AST  57<H>  /  ALT  43  /  AlkPhos  144<H>  09-23    PT/INR - ( 23 Sep 2019 18:45 )   PT: 11.9 sec;   INR: 1.04 ratio         PTT - ( 24 Sep 2019 09:29 )  PTT:57.0 sec    CARDIAC MARKERS ( 24 Sep 2019 11:20 )  x     / x     / 135 U/L / x     / 9.1 ng/mL  CARDIAC MARKERS ( 23 Sep 2019 18:45 )  x     / x     / 103 U/L / x     / 9.0 ng/mL          Cardiac Cath:    Reported triple vessel CAD      Assessment:  83y Female with recent episode midsternal chest pressure with radiation sholders Neg diaphoresis + palpitations and SOB.   Pt states symptoms were improved with rest but have occured more frequently x 2 weeks Cath revealed 3 VCAD Referred for surgical evaluation  Family at bedside stating they wish to explore non surgical options> they will speak to Dr Wick    Plan:  Preop workup in progress  P2Y12 for plavix reactivity  ASA statin betablocker CAD  Will await surgical plane after pt speaks to Dr Wick

## 2019-09-24 NOTE — H&P ADULT - PROBLEM SELECTOR PLAN 4
Unclear baseline, suspect combination of hemodynamic WINSTON as well as MARLENA  - Continue to trend  - Urine lytes  - Bladder Scan Unclear baseline, suspect combination of hemodynamic WINSTON as well as possible MARLENA  - Continue to trend  - Urine lytes  - Bladder Scan  -Renal US.   -May need cardio renal consult.

## 2019-09-24 NOTE — H&P ADULT - PROBLEM SELECTOR PLAN 6
Diet: DASH/TLC  DVT Prophylaxis: Heparin gtt  Dispo: PT consulted    Norah Biggs  PGY-2 Internal Medicine  Pager: [phone] (NS)/[phone] (DANNY)

## 2019-09-24 NOTE — H&P ADULT - PROBLEM SELECTOR PLAN 3
Afebrile, uptrending, suspect leukocytosis is reactive  - Blood cultures x2  - Monitor off antibiotics for now Afebrile, uptrending, suspect leukocytosis is reactive  - Blood cultures x2 - NGTD.   -F/u UA.   -CXR without consolidations.   - Monitor off antibiotics for now

## 2019-09-24 NOTE — PROGRESS NOTE ADULT - PROBLEM SELECTOR PLAN 1
TTE pending 9/24  Possible cardiac cath 9/24  Continue antihypertensive medications with hold parameters

## 2019-09-24 NOTE — PROGRESS NOTE ADULT - ASSESSMENT
HPI:  84 y/o [place] female (currently resides in [place] in the [place] visiting family) with PMHx of HTN who presented to the ED @ [facility] on 9/22 c/o exertional and non-exertional dyspnea, orthopnea and cough x 3 weeks, worsened 1 day prior to going to the hospital.  At OSH patient CT Chest negative for PE,  EKG without acute findings, Troponin I peak 1.58 ACS ASA/Heparin administered and patient received Plavix 300mg.  Patient also with WINSTON creatinine 2.0 on 9/22 --> 1.9 on 9/23.  TTE showing severe wall motion abnormality, LVEF 20%.  She is now transferred to [facility] in setting of NSTEMI and acute systolic HF for R/L HC.  Patient speaks Upper sorbian only,  phone service deferred.  Patients grandson Enrike Mitchell present at bedside to translate. (23 Sep 2019 17:35)

## 2019-09-24 NOTE — H&P ADULT - ATTENDING COMMENTS
-Patient seen/examined on 9/24/19. Case/plan discussed with the resident as reviewed/edited by me above and in any comments below. -Patient seen/examined on 9/24/19. Case/plan discussed with the resident as reviewed/edited by me above and in any comments below.  -Discussed with patient and her grandson and daughter at bedside. -Patient's grandson translates, per patient request.   -Family considering and would like to discuss further regarding CABG vs possible high risk PCI with the cardiology and CTS teams.

## 2019-09-24 NOTE — H&P ADULT - HISTORY OF PRESENT ILLNESS
Pt is an 82 yo Russian female (currently visiting family in the ) who presented to the ED at [facility] on 9/22 complaining of exertional chest pain, dyspnea, orthopnea, and cough for three weeks. At [facility], she was noted to have a CT Chest negative for PE, ekg without any changes, and troponins that peaked at 1.38. She was transferred to [facility] for further management of NSTEMI and acute systolic HF for a right and left heart catherization. Pt is an 82 yo Russian female (currently visiting family in the ) who presented to the ED at [facility] on 9/22 complaining of exertional chest pain, dyspnea, orthopnea, and cough for three weeks. At [facility], she was noted to have a CT Chest negative for PE, ekg without any changes, and troponins that peaked at 1.38. She was transferred to [facility] for further management of NSTEMI and acute systolic HF for a right and left heart catherization.     At [facility], catherization was deferred due to volume overloaded status and orthopnea. Patient was diuresed on Lasix 40 mg IV and Lasix 20 IV x1. Patient's orthopnea improved substantially and patient was able to have a left and right heart catherization. 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 CT Surgery was consulted for further management, who offered patient a CABG. However, per goals of care, family is undecided about open heart surgery.

## 2019-09-24 NOTE — H&P ADULT - NSHPREVIEWOFSYSTEMS_GEN_ALL_CORE
REVIEW OF SYSTEMS:    CONSTITUTIONAL: No weakness, fevers or chills  EYES/ENT: No visual changes;  No vertigo or throat pain   NECK: No pain or stiffness  RESPIRATORY: +Shortness of breath No cough, wheezing, hemoptysis;  CARDIOVASCULAR: +Chest pain, + palpitations  GASTROINTESTINAL: No abdominal or epigastric pain. No nausea, vomiting, or hematemesis; No diarrhea or constipation. No melena or hematochezia.  GENITOURINARY: No dysuria, frequency or hematuria  NEUROLOGICAL: No numbness or weakness  SKIN: No itching, rashes REVIEW OF SYSTEMS:    CONSTITUTIONAL: No weakness, fevers or chills  EYES/ENT: No visual changes;  No vertigo or throat pain   NECK: No pain or stiffness  RESPIRATORY: +Shortness of breath, +orthopnea (improved) No cough, wheezing, hemoptysis;  CARDIOVASCULAR: +Chest pain, + palpitations  GASTROINTESTINAL: No abdominal or epigastric pain. No nausea, vomiting, or hematemesis; No diarrhea or constipation. No melena or hematochezia.  GENITOURINARY: No dysuria, frequency or hematuria  NEUROLOGICAL: No numbness or weakness  SKIN: No itching, rashes

## 2019-09-25 DIAGNOSIS — N17.9 ACUTE KIDNEY FAILURE, UNSPECIFIED: ICD-10-CM

## 2019-09-25 PROBLEM — Z00.00 ENCOUNTER FOR PREVENTIVE HEALTH EXAMINATION: Status: ACTIVE | Noted: 2019-09-25

## 2019-09-25 PROBLEM — I10 ESSENTIAL (PRIMARY) HYPERTENSION: Chronic | Status: ACTIVE | Noted: 2019-09-23

## 2019-09-25 LAB
ALBUMIN SERPL ELPH-MCNC: 4.3 G/DL — SIGNIFICANT CHANGE UP (ref 3.3–5)
ALP SERPL-CCNC: 134 U/L — HIGH (ref 40–120)
ALT FLD-CCNC: 37 U/L — SIGNIFICANT CHANGE UP (ref 10–45)
ANION GAP SERPL CALC-SCNC: 23 MMOL/L — HIGH (ref 5–17)
APTT BLD: 47.5 SEC — HIGH (ref 27.5–36.3)
APTT BLD: 48.4 SEC — HIGH (ref 27.5–36.3)
APTT BLD: 56.5 SEC — HIGH (ref 27.5–36.3)
AST SERPL-CCNC: 49 U/L — HIGH (ref 10–40)
BILIRUB SERPL-MCNC: 0.7 MG/DL — SIGNIFICANT CHANGE UP (ref 0.2–1.2)
BLD GP AB SCN SERPL QL: NEGATIVE — SIGNIFICANT CHANGE UP
BUN SERPL-MCNC: 44 MG/DL — HIGH (ref 7–23)
CALCIUM SERPL-MCNC: 9.4 MG/DL — SIGNIFICANT CHANGE UP (ref 8.4–10.5)
CHLORIDE SERPL-SCNC: 95 MMOL/L — LOW (ref 96–108)
CHLORIDE UR-SCNC: 84 MMOL/L — SIGNIFICANT CHANGE UP
CHOLEST SERPL-MCNC: 214 MG/DL — HIGH (ref 10–199)
CK MB BLD-MCNC: 7.5 % — HIGH (ref 0–3.5)
CK MB CFR SERPL CALC: 7.6 NG/ML — HIGH (ref 0–3.8)
CK SERPL-CCNC: 102 U/L — SIGNIFICANT CHANGE UP (ref 25–170)
CO2 SERPL-SCNC: 18 MMOL/L — LOW (ref 22–31)
CREAT ?TM UR-MCNC: 16 MG/DL — SIGNIFICANT CHANGE UP
CREAT SERPL-MCNC: 2.22 MG/DL — HIGH (ref 0.5–1.3)
FIBRINOGEN PPP-MCNC: 589 MG/DL — HIGH (ref 350–510)
GLUCOSE SERPL-MCNC: 96 MG/DL — SIGNIFICANT CHANGE UP (ref 70–99)
HBA1C BLD-MCNC: 6.1 % — HIGH (ref 4–5.6)
HCT VFR BLD CALC: 46.9 % — HIGH (ref 34.5–45)
HDLC SERPL-MCNC: 78 MG/DL — SIGNIFICANT CHANGE UP
HGB BLD-MCNC: 14.9 G/DL — SIGNIFICANT CHANGE UP (ref 11.5–15.5)
LIPID PNL WITH DIRECT LDL SERPL: 111 MG/DL — HIGH
MAGNESIUM SERPL-MCNC: 2.3 MG/DL — SIGNIFICANT CHANGE UP (ref 1.6–2.6)
MCHC RBC-ENTMCNC: 28.1 PG — SIGNIFICANT CHANGE UP (ref 27–34)
MCHC RBC-ENTMCNC: 31.7 GM/DL — LOW (ref 32–36)
MCV RBC AUTO: 88.5 FL — SIGNIFICANT CHANGE UP (ref 80–100)
MRSA PCR RESULT.: SIGNIFICANT CHANGE UP
NT-PROBNP SERPL-SCNC: HIGH PG/ML (ref 0–300)
OSMOLALITY UR: 282 MOS/KG — LOW (ref 300–900)
PA ADP PRP-ACNC: 160 PRU — LOW (ref 194–417)
PHOSPHATE SERPL-MCNC: 4.3 MG/DL — SIGNIFICANT CHANGE UP (ref 2.5–4.5)
PLATELET # BLD AUTO: 253 K/UL — SIGNIFICANT CHANGE UP (ref 150–400)
POTASSIUM SERPL-MCNC: 3.8 MMOL/L — SIGNIFICANT CHANGE UP (ref 3.5–5.3)
POTASSIUM SERPL-SCNC: 3.8 MMOL/L — SIGNIFICANT CHANGE UP (ref 3.5–5.3)
PROT SERPL-MCNC: 8.6 G/DL — HIGH (ref 6–8.3)
RBC # BLD: 5.3 M/UL — HIGH (ref 3.8–5.2)
RBC # FLD: 13.7 % — SIGNIFICANT CHANGE UP (ref 10.3–14.5)
RH IG SCN BLD-IMP: POSITIVE — SIGNIFICANT CHANGE UP
S AUREUS DNA NOSE QL NAA+PROBE: SIGNIFICANT CHANGE UP
SODIUM SERPL-SCNC: 136 MMOL/L — SIGNIFICANT CHANGE UP (ref 135–145)
SODIUM UR-SCNC: 96 MMOL/L — SIGNIFICANT CHANGE UP
TOTAL CHOLESTEROL/HDL RATIO MEASUREMENT: 2.7 RATIO — LOW (ref 3.3–7.1)
TRIGL SERPL-MCNC: 126 MG/DL — SIGNIFICANT CHANGE UP (ref 10–149)
TROPONIN T, HIGH SENSITIVITY RESULT: 3232 NG/L — HIGH (ref 0–51)
TSH SERPL-MCNC: 0.22 UIU/ML — LOW (ref 0.27–4.2)
UUN UR-MCNC: 175 MG/DL — SIGNIFICANT CHANGE UP
WBC # BLD: 13.8 K/UL — HIGH (ref 3.8–10.5)
WBC # FLD AUTO: 13.8 K/UL — HIGH (ref 3.8–10.5)

## 2019-09-25 PROCEDURE — 99233 SBSQ HOSP IP/OBS HIGH 50: CPT | Mod: GC

## 2019-09-25 PROCEDURE — 94010 BREATHING CAPACITY TEST: CPT | Mod: 26

## 2019-09-25 PROCEDURE — 99223 1ST HOSP IP/OBS HIGH 75: CPT | Mod: GC

## 2019-09-25 PROCEDURE — 76770 US EXAM ABDO BACK WALL COMP: CPT | Mod: 26

## 2019-09-25 RX ORDER — HEPARIN SODIUM 5000 [USP'U]/ML
3800 INJECTION INTRAVENOUS; SUBCUTANEOUS EVERY 6 HOURS
Refills: 0 | Status: DISCONTINUED | OUTPATIENT
Start: 2019-09-25 | End: 2019-09-27

## 2019-09-25 RX ORDER — HYDRALAZINE HCL 50 MG
25 TABLET ORAL THREE TIMES A DAY
Refills: 0 | Status: DISCONTINUED | OUTPATIENT
Start: 2019-09-25 | End: 2019-09-26

## 2019-09-25 RX ORDER — HYDRALAZINE HCL 50 MG
10 TABLET ORAL THREE TIMES A DAY
Refills: 0 | Status: DISCONTINUED | OUTPATIENT
Start: 2019-09-25 | End: 2019-09-25

## 2019-09-25 RX ORDER — HEPARIN SODIUM 5000 [USP'U]/ML
INJECTION INTRAVENOUS; SUBCUTANEOUS
Qty: 25000 | Refills: 0 | Status: DISCONTINUED | OUTPATIENT
Start: 2019-09-25 | End: 2019-09-26

## 2019-09-25 RX ADMIN — Medication 81 MILLIGRAM(S): at 06:30

## 2019-09-25 RX ADMIN — HEPARIN SODIUM 850 UNIT(S)/HR: 5000 INJECTION INTRAVENOUS; SUBCUTANEOUS at 11:58

## 2019-09-25 RX ADMIN — Medication 25 MILLIGRAM(S): at 17:00

## 2019-09-25 RX ADMIN — Medication 25 MILLIGRAM(S): at 06:30

## 2019-09-25 RX ADMIN — Medication 40 MILLIGRAM(S): at 17:00

## 2019-09-25 RX ADMIN — Medication 40 MILLIGRAM(S): at 06:30

## 2019-09-25 RX ADMIN — Medication 25 MILLIGRAM(S): at 21:29

## 2019-09-25 RX ADMIN — HEPARIN SODIUM 850 UNIT(S)/HR: 5000 INJECTION INTRAVENOUS; SUBCUTANEOUS at 02:44

## 2019-09-25 RX ADMIN — HEPARIN SODIUM 750 UNIT(S)/HR: 5000 INJECTION INTRAVENOUS; SUBCUTANEOUS at 21:29

## 2019-09-25 RX ADMIN — ATORVASTATIN CALCIUM 80 MILLIGRAM(S): 80 TABLET, FILM COATED ORAL at 21:30

## 2019-09-25 NOTE — PROGRESS NOTE ADULT - ATTENDING COMMENTS
-Patient seen/examined on 9/25/19. Case/plan discussed with the intern and resident as reviewed/edited by me above and in any comments below.

## 2019-09-25 NOTE — PROGRESS NOTE ADULT - PROBLEM SELECTOR PLAN 5
Per chart review, patient on amlodipine 5 mg q Day at home  - Will hold off for now as patient is normotensive Per chart review, patient on amlodipine 5 mg q Day at home  - Hold amlodipine.  -Hydralazine added, per CHF.

## 2019-09-25 NOTE — CONSULT NOTE ADULT - SUBJECTIVE AND OBJECTIVE BOX
Date of Admission: 9/25/19    Patient is a 83y old  Female who presents with a chief complaint of NSTEMI.    HISTORY OF PRESENT ILLNESS:  82 yo F with HTN, HF who presented to OS with 9/22 complaining of exertional chest pain, dyspnea, orthopnea, and cough for three weeks. At [facility], she was noted to have a CT Chest negative for PE, ekg without any changes, and troponins that peaked at 1.38. She was transferred to [facility] for further management of NSTEMI and acute systolic HF for a right and left heart catherization.     At [facility], catherization was deferred due to volume overloaded status and orthopnea. Patient was diuresed on Lasix 40 mg IV and Lasix 20 IV x1. Patient's orthopnea improved substantially and patient was able to have a left and right heart catherization. 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 CT Surgery was consulted for further management, who offered patient a CABG. However, per goals of care, family is undecided about open heart surgery. (24 Sep 2019 18:01)      Allergies    No Known Allergies    Intolerances    	    MEDICATIONS:  aspirin enteric coated 81 milliGRAM(s) Oral daily  furosemide   Injectable 40 milliGRAM(s) IV Push two times a day  heparin  Infusion.  Unit(s)/Hr IV Continuous <Continuous>  heparin  Injectable 3800 Unit(s) IV Push every 6 hours PRN  metoprolol tartrate 25 milliGRAM(s) Oral two times a day  atorvastatin 80 milliGRAM(s) Oral at bedtime      PAST MEDICAL & SURGICAL HISTORY:  Essential hypertension  HTN (hypertension)  No significant past surgical history  No significant past surgical history      FAMILY HISTORY:  No pertinent family history in first degree relatives  No pertinent family history in first degree relatives      SOCIAL HISTORY:    visiting from [place], denies tobacco, alcohol, drugs      REVIEW OF SYSTEMS:    CONSTITUTIONAL: No weakness, fevers or chills  EYES/ENT: No visual changes;  No dysphagia  RESPIRATORY: No cough, wheezing, hemoptysis; No shortness of breath  CARDIOVASCULAR: No chest pain or palpitations; No lower extremity edema  GASTROINTESTINAL: No abdominal or epigastric pain. No nausea, vomiting, or hematemesis  GENITOURINARY: No dysuria, frequency or hematuria  NEUROLOGICAL: No numbness or weakness  SKIN: No itching, burning, rashes, or lesions  HEME: No bleeding or bruising  MSK: No joint pains or muscle pains  All other review of systems is negative unless indicated above.    PHYSICAL EXAM:  T(C): 37.1 (09-25-19 @ 11:52), Max: 37.1 (09-25-19 @ 11:52)  HR: 85 (09-25-19 @ 11:52) (85 - 102)  BP: 138/94 (09-25-19 @ 11:52) (119/75 - 143/99)  RR: 17 (09-25-19 @ 11:52) (17 - 17)  SpO2: 100% (09-25-19 @ 11:52) (96% - 100%)  Wt(kg): --  I&O's Summary    24 Sep 2019 07:01  -  25 Sep 2019 07:00  --------------------------------------------------------  IN: 620 mL / OUT: 2700 mL / NET: -2080 mL    25 Sep 2019 07:01  -  25 Sep 2019 16:16  --------------------------------------------------------  IN: 120 mL / OUT: 350 mL / NET: -230 mL        Appearance: Normal	  HEENT:   Normal oral mucosa  Cardiovascular: Normal S1 S2, No JVD, No murmurs, No edema  Respiratory: Lungs clear to auscultation	  Psychiatry: A & O x 3, Mood & affect appropriate  Gastrointestinal:  Soft, Non-tender, + BS	  Skin: No rashes, No ecchymoses, No cyanosis	  Neurologic: Non-focal  Extremities: Normal range of motion, No clubbing, cyanosis        LABS:	 	    CBC Full  -  ( 25 Sep 2019 01:14 )  WBC Count : 13.8 K/uL  Hemoglobin : 14.9 g/dL  Hematocrit : 46.9 %  Platelet Count - Automated : 253 K/uL  Mean Cell Volume : 88.5 fl  Mean Cell Hemoglobin : 28.1 pg  Mean Cell Hemoglobin Concentration : 31.7 gm/dL  Auto Neutrophil # : x  Auto Lymphocyte # : x  Auto Monocyte # : x  Auto Eosinophil # : x  Auto Basophil # : x  Auto Neutrophil % : x  Auto Lymphocyte % : x  Auto Monocyte % : x  Auto Eosinophil % : x  Auto Basophil % : x    09-25    136  |  95<L>  |  44<H>  ----------------------------<  96  3.8   |  18<L>  |  2.22<H>  09-24    135  |  99  |  34<H>  ----------------------------<  117<H>  4.6   |  19<L>  |  2.04<H>    Ca    9.4      25 Sep 2019 01:14  Ca    9.2      24 Sep 2019 02:39  Phos  4.3     09-25  Mg     2.3     09-25    TPro  8.6<H>  /  Alb  4.3  /  TBili  0.7  /  DBili  x   /  AST  49<H>  /  ALT  37  /  AlkPhos  134<H>  09-25  TPro  7.8  /  Alb  4.1  /  TBili  0.4  /  DBili  x   /  AST  57<H>  /  ALT  43  /  AlkPhos  144<H>  09-23      proBNP: Serum Pro-Brain Natriuretic Peptide: 69765 pg/mL (09-25 @ 01:14)    Lipid Profile:   HgA1c: Hemoglobin A1C, Whole Blood: 6.1 % (09-25 @ 09:57)    TSH: Thyroid Stimulating Hormone, Serum: 0.22 uIU/mL (09-25 @ 08:44)    CARDIAC MARKERS:      ECG:  	  RADIOLOGY:    PREVIOUS DIAGNOSTIC TESTING:    Echo 9/24/19  Observations:  Mitral Valve: Normal mitral valve. Minimal mitral  regurgitation.  Aortic Valve/Aorta: Calcified aortic valve with normal  opening.  Mild aortic regurgitation.  Normal aortic root size.  Left Atrium: Normal left atrium.  Left Ventricle: Endocardial visualization enhanced with  intravenous injection of Ultrasonic Enhancing Agent  (Definity).  Severely reduced left ventricular systolic function.  Akinesis of the inferolateral wall. Large area of apical  akinesis.  Right Heart: Right atrium not well visualized. Normal right  ventricular size and function. Normal tricuspid valve.  Minimal tricuspid regurgitation. Normal pulmonic valve.  Mild pulmonic regurgitation.  Pericardium/Pleura: Normal pericardium with no pericardial  effusion.  Hemodynamic: Estimated right atrial pressure is normal.  No evidence of pulmonary hypertension.  ------------------------------------------------------------------------  Conclusions:  Endocardial visualization enhanced with intravenous  injection of Ultrasonic Enhancing Agent (Definity).  Severely reduced left ventricular systolic function.  Akinesis ofthe inferolateral wall. Large area of apical  akinesis.    Cath 9/24/19  CORONARY VESSELS: The coronary circulation is right dominant.  LM:  --  LM: Normal.  LAD:   --  Mid LAD: There was a diffuse 99 % stenosis.  CX:   --  OM2: There was a 99 % stenosis.  RI:   --  Proximal ramus intermedius: There was a 100 % stenosis.  RCA:   --  Proximal RCA: There was a 100 % stenosis.  COMPLICATIONS: There were no complications.    [facility] 9/24/19      ASSESSMENT/PLAN: 	      Adalid Newton MD  Cardiology Fellow   [phone], M-F 7:30A-5P    All Cardiology service information can be found on [email or web address], password: [email or web address]. Date of Admission: 9/25/19    Patient is a 83y old  Female who presents with a chief complaint of NSTEMI.    HISTORY OF PRESENT ILLNESS:  84 yo F with HTN, HF who presented to OSH with CP and SOB for the last few weeks. She had no EKG changes but noted to have trop of 1.38 and she was transferred to NS for cardiac cath.    Per family, patient was seen at [facility] in 2017 for SOB and found to have low EF (?20-30%) but no cath was done at the time and she was only started on amlodipine and discharged. She was doing well until this admission. She had no issues with SOB, palp, dizziness, OPAL, orthopnea or PND. No hospitalizations recently. About 3 weeks ago when she returned from [place], she started having progressive SOB and limited functional capacity.    Cath showed TVD requiring possible CABG and elevated filling pressures for which HF was consulted.      Allergies    No Known Allergies    Intolerances    	    MEDICATIONS:  aspirin enteric coated 81 milliGRAM(s) Oral daily  furosemide   Injectable 40 milliGRAM(s) IV Push two times a day  heparin  Infusion.  Unit(s)/Hr IV Continuous <Continuous>  heparin  Injectable 3800 Unit(s) IV Push every 6 hours PRN  metoprolol tartrate 25 milliGRAM(s) Oral two times a day  atorvastatin 80 milliGRAM(s) Oral at bedtime      PAST MEDICAL & SURGICAL HISTORY:  Essential hypertension  HTN (hypertension)  No significant past surgical history  No significant past surgical history      FAMILY HISTORY:  No pertinent family history in first degree relatives  No pertinent family history in first degree relatives      SOCIAL HISTORY:    visiting from [place], denies tobacco, alcohol, drugs      REVIEW OF SYSTEMS:    CONSTITUTIONAL: No weakness, fevers or chills  EYES/ENT: No visual changes;  No dysphagia  RESPIRATORY: No cough, wheezing, hemoptysis; No shortness of breath  CARDIOVASCULAR: No chest pain or palpitations; No lower extremity edema  GASTROINTESTINAL: No abdominal or epigastric pain. No nausea, vomiting, or hematemesis  GENITOURINARY: No dysuria, frequency or hematuria  NEUROLOGICAL: No numbness or weakness  SKIN: No itching, burning, rashes, or lesions  HEME: No bleeding or bruising  MSK: No joint pains or muscle pains  All other review of systems is negative unless indicated above.    PHYSICAL EXAM:  T(C): 37.1 (09-25-19 @ 11:52), Max: 37.1 (09-25-19 @ 11:52)  HR: 85 (09-25-19 @ 11:52) (85 - 102)  BP: 138/94 (09-25-19 @ 11:52) (119/75 - 143/99)  RR: 17 (09-25-19 @ 11:52) (17 - 17)  SpO2: 100% (09-25-19 @ 11:52) (96% - 100%)  Wt(kg): --  I&O's Summary    24 Sep 2019 07:01  -  25 Sep 2019 07:00  --------------------------------------------------------  IN: 620 mL / OUT: 2700 mL / NET: -2080 mL    25 Sep 2019 07:01  -  25 Sep 2019 16:16  --------------------------------------------------------  IN: 120 mL / OUT: 350 mL / NET: -230 mL        Appearance: Normal	  HEENT:   Normal oral mucosa  Cardiovascular: Normal S1 S2, +JVD, No murmurs, No edema  Respiratory: Lungs clear to auscultation	  Psychiatry: A & O x 3, Mood & affect appropriate  Gastrointestinal:  Soft, Non-tender, + BS	  Skin: No rashes, No ecchymoses, No cyanosis	  Neurologic: Non-focal  Extremities: Normal range of motion, No clubbing, cyanosis        LABS:	 	    CBC Full  -  ( 25 Sep 2019 01:14 )  WBC Count : 13.8 K/uL  Hemoglobin : 14.9 g/dL  Hematocrit : 46.9 %  Platelet Count - Automated : 253 K/uL  Mean Cell Volume : 88.5 fl  Mean Cell Hemoglobin : 28.1 pg  Mean Cell Hemoglobin Concentration : 31.7 gm/dL  Auto Neutrophil # : x  Auto Lymphocyte # : x  Auto Monocyte # : x  Auto Eosinophil # : x  Auto Basophil # : x  Auto Neutrophil % : x  Auto Lymphocyte % : x  Auto Monocyte % : x  Auto Eosinophil % : x  Auto Basophil % : x    09-25    136  |  95<L>  |  44<H>  ----------------------------<  96  3.8   |  18<L>  |  2.22<H>  09-24    135  |  99  |  34<H>  ----------------------------<  117<H>  4.6   |  19<L>  |  2.04<H>    Ca    9.4      25 Sep 2019 01:14  Ca    9.2      24 Sep 2019 02:39  Phos  4.3     09-25  Mg     2.3     09-25    TPro  8.6<H>  /  Alb  4.3  /  TBili  0.7  /  DBili  x   /  AST  49<H>  /  ALT  37  /  AlkPhos  134<H>  09-25  TPro  7.8  /  Alb  4.1  /  TBili  0.4  /  DBili  x   /  AST  57<H>  /  ALT  43  /  AlkPhos  144<H>  09-23      proBNP: Serum Pro-Brain Natriuretic Peptide: 92907 pg/mL (09-25 @ 01:14)    Lipid Profile:   HgA1c: Hemoglobin A1C, Whole Blood: 6.1 % (09-25 @ 09:57)    TSH: Thyroid Stimulating Hormone, Serum: 0.22 uIU/mL (09-25 @ 08:44)    CARDIAC MARKERS:  hs trop 2100 --> 3100    RADIOLOGY: CXR w/ bilateral pleural effusions, mild vascular congestion    PREVIOUS DIAGNOSTIC TESTING:    Echo 9/24/19  Observations:  Mitral Valve: Normal mitral valve. Minimal mitral  regurgitation.  Aortic Valve/Aorta: Calcified aortic valve with normal  opening.  Mild aortic regurgitation.  Normal aortic root size.  Left Atrium: Normal left atrium.  Left Ventricle: Endocardial visualization enhanced with  intravenous injection of Ultrasonic Enhancing Agent  (Definity).  Severely reduced left ventricular systolic function.  Akinesis of the inferolateral wall. Large area of apical  akinesis.  Right Heart: Right atrium not well visualized. Normal right  ventricular size and function. Normal tricuspid valve.  Minimal tricuspid regurgitation. Normal pulmonic valve.  Mild pulmonic regurgitation.  Pericardium/Pleura: Normal pericardium with no pericardial  effusion.  Hemodynamic: Estimated right atrial pressure is normal.  No evidence of pulmonary hypertension.  ------------------------------------------------------------------------  Conclusions:  Endocardial visualization enhanced with intravenous  injection of Ultrasonic Enhancing Agent (Definity).  Severely reduced left ventricular systolic function.  Akinesis ofthe inferolateral wall. Large area of apical  akinesis.    Cath 9/24/19  CORONARY VESSELS: The coronary circulation is right dominant.  LM:  --  LM: Normal.  LAD:   --  Mid LAD: There was a diffuse 99 % stenosis.  CX:   --  OM2: There was a 99 % stenosis.  RI:   --  Proximal ramus intermedius: There was a 100 % stenosis.  RCA:   --  Proximal RCA: There was a 100 % stenosis.  COMPLICATIONS: There were no complications.    RHC 9/24/19  RA 14/12/10  RV 47/12  PA 48/23/34  PCWP 36/40/29  PA sat 57.9  CO 3.68  CI 2.24  SVR 2000

## 2019-09-25 NOTE — PROGRESS NOTE ADULT - PROBLEM SELECTOR PLAN 4
Unclear baseline, suspect combination of hemodynamic WINSTON as well as possible MARLENA  - Continue to trend  - Urine lytes  - Bladder Scan  -Renal US.   -May need cardio renal consult. Unclear baseline, suspect combination of hemodynamic WINSTON as well as possible MARLENA  - Continue to trend  - Urine lytes  - Bladder Scan  - Renal US.   - Appreciate renal - likely due to contrast.

## 2019-09-25 NOTE — PROGRESS NOTE ADULT - PROBLEM SELECTOR PLAN 1
Patient found to have triple vessel disease on recent cath  - CT surgery consulted: family wishing to explore surgical vs non-surgical options at this time  - Family meeting at 11 am tomorrow to discuss CABG.  - Will proceed with pre-op workup including P2Y12 for plavix activity and medical optimization per cardiology  - Heparin gtt given substantial triple vessel disease, per cardiology recommendations  - ASA 81mg q Day  - Atorvastatin 80 mg q Day  - Metoprolol tartrate 25 mg po BID  - Holding Plavix i/s/o possible CABG. Continue to trend P2Y12 levels  - Lipid, TSH, and A1c with am labs  -Trend trops.   -C/w telemetry. Patient found to have triple vessel disease on recent cath  - CT surgery consulted: family wishing to explore surgical vs non-surgical options at this time  - Cardiac viability study to elucidate possible benefits of PCI  - Will proceed with pre-op workup including P2Y12 for plavix activity and medical optimization per cardiology  - Heparin gtt given substantial triple vessel disease, per cardiology recommendations  - ASA 81mg q Day  - Atorvastatin 80 mg q Day  - Metoprolol tartrate 25 mg po BID  - Holding Plavix i/s/o possible CABG. Continue to trend P2Y12 levels  - Lipid, TSH, and A1c with am labs  -Trend cardiac enzymes  -C/w telemetry. Patient found to have triple vessel disease on recent cath  - CT surgery consulted: family wishing to explore surgical vs non-surgical options at this time  - Cardiac viability study to elucidate possible benefits of revascularization.   - Will proceed with pre-op workup including P2Y12 for plavix activity and medical optimization per cardiology  - Heparin gtt given substantial triple vessel disease, per cardiology recommendations  - ASA 81mg q Day  - Atorvastatin 80 mg q Day  - Metoprolol tartrate 25 mg po BID  - Holding Plavix i/s/o possible CABG. Continue to trend P2Y12 levels  - Lipid, TSH, and A1c with am labs  -Trend cardiac enzymes  -C/w telemetry.  -Added hydralazine per CHF recs.

## 2019-09-25 NOTE — PROGRESS NOTE ADULT - SUBJECTIVE AND OBJECTIVE BOX
INTERVAL EVENTS: YI    SUBJECTIVE:  Feels weak when she stands. She feels short of breath when she stands.  Denies chest pain.  Mild, intermittent back pain with rest and not with exertion.    ROS:  General - No fevers or chills  Cards - No chest pain or palpitations  Pulm - No cough. +SOB  GI - No abdominal pain   - No dysuria    OBJECTIVE:  Vitals Last 24 hrs  Vital Signs Last 24 Hrs  T(C): 36.4 (25 Sep 2019 06:30), Max: 36.4 (24 Sep 2019 19:25)  T(F): 97.6 (25 Sep 2019 06:30), Max: 97.6 (24 Sep 2019 19:25)  HR: 92 (25 Sep 2019 06:30) (84 - 102)  BP: 140/86 (25 Sep 2019 06:30) (119/75 - 146/96)  BP(mean): --  RR: 17 (25 Sep 2019 06:30) (17 - 17)  SpO2: 99% (25 Sep 2019 06:30) (95% - 99%)    IOs  I&O's Summary    24 Sep 2019 07:01  -  25 Sep 2019 07:00  --------------------------------------------------------  IN: 620 mL / OUT: 2700 mL / NET: -2080 mL        PE:  General - In bed. NAD.  Heart - Normal S1 and S2. No murmurs auscultated.  Lungs - Clear to auscultation bilaterally  GI - Abdomen soft, NT, ND.  Extremities - No lower extremity edema. Peripheral pulses intact. Calves nontender.  Skin - No rashes on extremities    LABS:  CARDIAC MARKERS ( 25 Sep 2019 01:14 )  x     / x     / 102 U/L / x     / 7.6 ng/mL  CARDIAC MARKERS ( 24 Sep 2019 11:20 )  x     / x     / 135 U/L / x     / 9.1 ng/mL  CARDIAC MARKERS ( 23 Sep 2019 18:45 )  x     / x     / 103 U/L / x     / 9.0 ng/mL    09-25    136  |  95<L>  |  44<H>  ----------------------------<  96  3.8   |  18<L>  |  2.22<H>    Ca    9.4      25 Sep 2019 01:14  Phos  4.3     09-25  Mg     2.3     09-25    TPro  8.6<H>  /  Alb  4.3  /  TBili  0.7  /  DBili  x   /  AST  49<H>  /  ALT  37  /  AlkPhos  134<H>  09-25                            14.9   13.8  )-----------( 253      ( 25 Sep 2019 01:14 )             46.9         Culture - Blood (collected 23 Sep 2019 21:10)  Source: .Blood  Preliminary Report (24 Sep 2019 22:01):    No growth to date.    Culture - Blood (collected 23 Sep 2019 21:10)  Source: .Blood  Preliminary Report (24 Sep 2019 22:01):    No growth to date.        IMAGING: INTERVAL EVENTS: YI    SUBJECTIVE:  Feels weak when she stands. She feels short of breath when she stands.  Denies chest pain.  Mild, intermittent back pain with rest and not with exertion.    ROS:  General - No fevers or chills  Cards - No chest pain or palpitations  Pulm - No cough. +SOB  GI - No abdominal pain   - No dysuria    OBJECTIVE:  Vitals Last 24 hrs  Vital Signs Last 24 Hrs  T(C): 36.4 (25 Sep 2019 06:30), Max: 36.4 (24 Sep 2019 19:25)  T(F): 97.6 (25 Sep 2019 06:30), Max: 97.6 (24 Sep 2019 19:25)  HR: 92 (25 Sep 2019 06:30) (84 - 102)  BP: 140/86 (25 Sep 2019 06:30) (119/75 - 146/96)  BP(mean): --  RR: 17 (25 Sep 2019 06:30) (17 - 17)  SpO2: 99% (25 Sep 2019 06:30) (95% - 99%)    IOs  I&O's Summary    24 Sep 2019 07:01  -  25 Sep 2019 07:00  --------------------------------------------------------  IN: 620 mL / OUT: 2700 mL / NET: -2080 mL        PE:  General - In bed. NAD.  Heart - Normal S1 and S2. No murmurs auscultated.  Lungs - Clear to auscultation bilaterally  GI - Abdomen soft, NT, ND.  Extremities - No lower extremity edema. Peripheral pulses intact. Calves nontender.  Skin - No rashes on extremities    LABS:  CARDIAC MARKERS ( 25 Sep 2019 01:14 )  x     / x     / 102 U/L / x     / 7.6 ng/mL  CARDIAC MARKERS ( 24 Sep 2019 11:20 )  x     / x     / 135 U/L / x     / 9.1 ng/mL  CARDIAC MARKERS ( 23 Sep 2019 18:45 )  x     / x     / 103 U/L / x     / 9.0 ng/mL    Troponins 3232 < 3123    09-25    136  |  95<L>  |  44<H>  ----------------------------<  96  3.8   |  18<L>  |  2.22<H>    Ca    9.4      25 Sep 2019 01:14  Phos  4.3     09-25  Mg     2.3     09-25    TPro  8.6<H>  /  Alb  4.3  /  TBili  0.7  /  DBili  x   /  AST  49<H>  /  ALT  37  /  AlkPhos  134<H>  09-25                            14.9   13.8  )-----------( 253      ( 25 Sep 2019 01:14 )             46.9       Culture - Blood (collected 23 Sep 2019 21:10)  Source: .Blood  Preliminary Report (24 Sep 2019 22:01):    No growth to date.    Culture - Blood (collected 23 Sep 2019 21:10)  Source: .Blood  Preliminary Report (24 Sep 2019 22:01):    No growth to date.        IMAGING: Pending cardiac viability study

## 2019-09-25 NOTE — CONSULT NOTE ADULT - PROBLEM SELECTOR RECOMMENDATION 9
Patient with WINSTON on CKD in the setting of MARLENA from recent cardiac catheterization. Patient with CKD likely due to uncontrolled HTN. Patient has bland urine sediment and U/S of the kidneys showing multiple renal cysts. Will continue to follow for now however for acute component will continue to monitor and recommend no additional contrast for at least 48-72 hrs. Otherwise avoid other nephrotoxic agents, avoid hypotensive episodes and renally dose all medications.

## 2019-09-25 NOTE — CONSULT NOTE ADULT - ASSESSMENT
83 year old female with history of HTN who presents to the hospital as a transfer from outside hospital with worsening shortness of breath and exertional dyspnea. Nephrology consulted for WINSTON on CKD.
ASSESSMENT/PLAN: 	  84 yo F with HTN, HF who presented to OSH with CP and SOB for the last few weeks. She had no EKG changes but noted to have trop of 1.38 and she was transferred to NS for cardiac cath, found to have triple vessel disease, pending CABG evaluation, as well as elevated filling pressures on RHC. She does not appear grossly fluid overloaded on exam but does have elevated JVD.     1. Ischemic cardiomyopathy, EF 30%  - RHC w/ elevated filling pressures, PCWP 29, w/ good CO/CI (3.6/2.2) and elevated SVR (2000)  - pending viability study for potential CABG given severe triple vessel disease  - c/w diuresis w/ lasix 40mg IV BID, goal net neg 1-2L daily  - c/w metop 25mg BID  - start hydralazine 25mg TID, hold for SBP <90  - strict I/Os, daily standing weights    2. CAD  - s/p cath with TVD  - pending viability study to determine if CABG vs PCI is suitable  - c/w asa, atorvastatin 80mg daily, hep gtt x48h    3. HTN  - elevated, c/w BB, lasix    4. WINSTON on CKD  - Cr 2.22, baseline 1.9 per family  - likely due to contrast load from cath and CT PE at OSH  - hold RORY Newton MD  Cardiology Fellow   [phone], M-F 7:30A-5P    All Cardiology service information can be found on [email or web address], password: [email or web address].

## 2019-09-25 NOTE — PROGRESS NOTE ADULT - PROBLEM SELECTOR PLAN 6
Diet: DASH/TLC  DVT Prophylaxis: Heparin gtt  Dispo: PT consulted    Norah Biggs  PGY-2 Internal Medicine  Pager: [phone] (NS)/[phone] (DANNY) Diet: DASH/TLC  DVT Prophylaxis: Heparin gtt  Dispo: PT consulted

## 2019-09-25 NOTE — CONSULT NOTE ADULT - ATTENDING COMMENTS
83 year old woman admitted with systolic congestive heart failure acute and found to have diffuse triple vessel at cath.  Creatinine has been rising since admission.  Echo shows severe LV dysfunction with anteroapical akinesis and inferolateral hypokinesis.  Family is uncertain about open heart surgery.  Anatomically the LAD is a poor vessel totally occluded and diffusely diseased.  The remaining vessels are small including a diagonal and marginal which appear to be less than 1.5 mm.    In my opinion the patient would not be a candidate for an IABP during surgery due to her small stature and likely inadequate femoral arteries without putting her at significant risk for leg ischemia.  In my opinion, she is a high risk for surgery.  Viability study being performed for the anterior wall.  I believe she would fare best with PCI and medical therapy of her heart failure.  I encourage other surgical opinions.
My overall assessment is that this is a 84 YO F with a history of ACC/AHA Stage C ischemic cardiomyopathy, HTN, and CKD III (baseline Cr 1.8) who presented with NSTEMI and found to have 3v CAD now awaiting revascularization options for which heart failure cardiology consulted for further management. She is mildly overloaded and does not have reduced cardiac output on invasive hemodynamics despite very high SVR at time of assessment. She has room for afterload reduction and no prior HF hospitalizations. Would proceed with revascularization while optimizing medical therapy.     Review of pertinent studies reveals:  TTE 9/24/19: very limited images (only contrast enhanced images helpful), LV non-dilated, EF 30%, akinesis of inferolateral wall and apex    LHC 9/24/19: severe diffuse mid LAD stenosis, OM2 subtotally occluded, ramus occluded, RCA occluded    RHC 9/24/19: RA 10, PA 48/23 (34), PCWP 29, Sherry CO/CI 3.7/2.2,  with SVR 1990      The plan for today is as follows:  - add hydralizine 25 mg TID and uptitrate as allows. switch metoprolol to 50 mg succinate daily. holding RAAS inhibitor with WINSTON  - continue IV lasix for 1-2 more days, will switch to PO soon  - viability study performed, revascularization strategy to be determined subsequently  - continue ASA/statin  - start P2Y12 inhibitor if she will not undergo surgery for treatment of NSTEMI    Please feel free to call me with any questions: [phone]

## 2019-09-25 NOTE — CONSULT NOTE ADULT - SUBJECTIVE AND OBJECTIVE BOX
[facility] Division of Kidney Diseases & Hypertension  INITIAL CONSULT NOTE  [phone]--------------------------------------------------------------------------------  HPI: 83 year old female with history of HTN who presents to the hospital as a transfer from outside hospital with worsening shortness of breath and exertional dyspnea. Patients family member at bedside providing collateral history. States that she started having these symptoms after a device was placed on her legs that she was afraid of and she is concerned this caused her to have heart problems. Patient presented here with NSTEMI and was taken to cardiac catheterization and found to have multi-vessel disease  requiring CABG. Patient and family considering options prior to having procedure done. Noted creatinine is elevated and nephrology consulted for management of CKD. Patient noted to have a creatinine of 1.8 3 years ago when patient was seen in ED and now creatinine elevated to 2.2. Otherwise patient has stable symptoms.        PAST HISTORY  --------------------------------------------------------------------------------  PAST MEDICAL & SURGICAL HISTORY:  Essential hypertension  HTN (hypertension)  No significant past surgical history  No significant past surgical history    FAMILY HISTORY:  No pertinent family history in first degree relatives  No pertinent family history in first degree relatives    PAST SOCIAL HISTORY:    ALLERGIES & MEDICATIONS  --------------------------------------------------------------------------------  Allergies    No Known Allergies    Intolerances      Standing Inpatient Medications  aspirin enteric coated 81 milliGRAM(s) Oral daily  atorvastatin 80 milliGRAM(s) Oral at bedtime  furosemide   Injectable 40 milliGRAM(s) IV Push two times a day  heparin  Infusion.  Unit(s)/Hr IV Continuous <Continuous>  hydrALAZINE 25 milliGRAM(s) Oral three times a day  metoprolol tartrate 25 milliGRAM(s) Oral two times a day    PRN Inpatient Medications  heparin  Injectable 3800 Unit(s) IV Push every 6 hours PRN      REVIEW OF SYSTEMS  --------------------------------------------------------------------------------  Gen: No  fevers/chills, weakness  Skin: No rashes  Head/Eyes/Ears/Mouth: No headache; Normal hearing; Normal vision w/o blurriness;   Respiratory: No dyspnea, cough, wheezing, hemoptysis  CV: No chest pain,   GI: No abdominal pain, diarrhea, constipation, nausea, vomiting  : No increased frequency, dysuria, hematuria, nocturia  MSK: No joint pain/swelling;   Neuro: No dizziness/lightheadedness, weakness, seizures    All other systems were reviewed and are negative, except as noted.    VITALS/PHYSICAL EXAM  --------------------------------------------------------------------------------  T(C): 37.1 (09-25-19 @ 11:52), Max: 37.1 (09-25-19 @ 11:52)  HR: 101 (09-25-19 @ 16:59) (85 - 101)  BP: 124/83 (09-25-19 @ 16:59) (119/75 - 140/86)  RR: 17 (09-25-19 @ 11:52) (17 - 17)  SpO2: 100% (09-25-19 @ 11:52) (97% - 100%)  Wt(kg): --        09-24-19 @ 07:01  -  09-25-19 @ 07:00  --------------------------------------------------------  IN: 620 mL / OUT: 2700 mL / NET: -2080 mL    09-25-19 @ 07:01  -  09-25-19 @ 19:20  --------------------------------------------------------  IN: 120 mL / OUT: 350 mL / NET: -230 mL      Physical Exam:  	Gen: NAD, well-appearing  	HEENT: PERRL, supple neck  	Pulm: CTA B/L  	CV:  S1S2  	Abd: +BS, soft   	Ext: No B/L Lower ext edema  	Neuro: No focal deficits  	Skin: Warm, without rashes  	Vascular: No cyanosis    LABS/STUDIES  --------------------------------------------------------------------------------              14.9   13.8  >-----------<  253      [09-25-19 @ 01:14]              46.9     136  |  95  |  44  ----------------------------<  96      [09-25-19 @ 01:14]  3.8   |  18  |  2.22        Ca     9.4     [09-25-19 @ 01:14]      Mg     2.3     [09-25-19 @ 01:14]      Phos  4.3     [09-25-19 @ 01:14]    TPro  8.6  /  Alb  4.3  /  TBili  0.7  /  DBili  x   /  AST  49  /  ALT  37  /  AlkPhos  134  [09-25-19 @ 01:14]      PTT: 56.5       [09-25-19 @ 09:39]          [09-25-19 @ 01:14]    Creatinine Trend:  SCr 2.22 [09-25 @ 01:14]  SCr 2.04 [09-24 @ 02:39]  SCr 1.93 [09-23 @ 18:45]    Urinalysis - [09-24-19 @ 20:59]      Color Colorless / Appearance Clear / SG 1.010 / pH 6.0      Gluc Negative / Ketone Negative  / Bili Negative / Urobili Negative       Blood Trace / Protein Trace / Leuk Est Negative / Nitrite Negative      RBC 3 / WBC 4 / Hyaline 1 / Gran  / Sq Epi  / Non Sq Epi 1 / Bacteria Negative    Urine Creatinine 16      [09-25-19 @ 01:54]  Urine Sodium 96      [09-25-19 @ 01:54]  Urine Urea Nitrogen 175      [09-25-19 @ 07:54]  Urine Chloride 84      [09-25-19 @ 01:54]  Urine Osmolality 282      [09-25-19 @ 01:57]    HbA1c 6.1      [09-25-19 @ 09:57]  TSH 0.22      [09-25-19 @ 08:44]  Lipid: chol 214, , HDL 78,       [09-25-19 @ 08:44]

## 2019-09-25 NOTE — PROGRESS NOTE ADULT - PROBLEM SELECTOR PLAN 3
Afebrile, uptrending, suspect leukocytosis is reactive  - Blood cultures x2 - NGTD.   -F/u UA.   -CXR without consolidations.   - Monitor off antibiotics for now Afebrile, uptrending, suspect leukocytosis is reactive  - Blood cultures x2 - NGTD.   - F/u UA.   - CXR without consolidations.   - Monitor off antibiotics for now Afebrile, downtrending, suspect leukocytosis is reactive  - Blood cultures x2 - NGTD.   - F/u UA.   - CXR without consolidations.   - Monitor off antibiotics for now

## 2019-09-25 NOTE — PROGRESS NOTE ADULT - PROBLEM SELECTOR PLAN 2
-Presented with acute systolic heart failure. Unclear chronicity.   Echo showing Severely reduced left ventricular systolic function.  Akinesis of the inferolateral wall. Large area of apical  akinesis, s/p 2 L diuresis  - Lasix 40 mg IV BID  - Daily standing weights and strict I's/O's.  -Consider afterload reducing agent. -Presented with acute systolic heart failure. Unclear chronicity.   Echo showing Severely reduced left ventricular systolic function.  Akinesis of the inferolateral wall. Large area of apical  akinesis, s/p 2 L diuresis  - Lasix 40 mg IV BID  - Daily standing weights and strict I's/O's.  -Hydralazine added, per CHF for afterload reducing agent.

## 2019-09-25 NOTE — PROGRESS NOTE ADULT - ASSESSMENT
82 y/o [place] female (currently resides in [place] in the  visiting family) with PMHx of HTN who presented to the ED @ [facility] on 9/22 for ongoing dyspnea noted to have acute on chronic systolic heart failure and NSTEMI, transferred here for LHC/RHC evaluation, found to have triple vessel disease.

## 2019-09-26 LAB
ALBUMIN SERPL ELPH-MCNC: 4 G/DL — SIGNIFICANT CHANGE UP (ref 3.3–5)
ALP SERPL-CCNC: 117 U/L — SIGNIFICANT CHANGE UP (ref 40–120)
ALT FLD-CCNC: 32 U/L — SIGNIFICANT CHANGE UP (ref 10–45)
ANION GAP SERPL CALC-SCNC: 16 MMOL/L — SIGNIFICANT CHANGE UP (ref 5–17)
ANION GAP SERPL CALC-SCNC: 17 MMOL/L — SIGNIFICANT CHANGE UP (ref 5–17)
ANION GAP SERPL CALC-SCNC: 18 MMOL/L — HIGH (ref 5–17)
APTT BLD: 48.9 SEC — HIGH (ref 27.5–36.3)
APTT BLD: 49.6 SEC — HIGH (ref 27.5–36.3)
APTT BLD: 53.9 SEC — HIGH (ref 27.5–36.3)
AST SERPL-CCNC: 31 U/L — SIGNIFICANT CHANGE UP (ref 10–40)
BILIRUB SERPL-MCNC: 0.6 MG/DL — SIGNIFICANT CHANGE UP (ref 0.2–1.2)
BUN SERPL-MCNC: 48 MG/DL — HIGH (ref 7–23)
BUN SERPL-MCNC: 48 MG/DL — HIGH (ref 7–23)
BUN SERPL-MCNC: 50 MG/DL — HIGH (ref 7–23)
CALCIUM SERPL-MCNC: 9.1 MG/DL — SIGNIFICANT CHANGE UP (ref 8.4–10.5)
CALCIUM SERPL-MCNC: 9.1 MG/DL — SIGNIFICANT CHANGE UP (ref 8.4–10.5)
CALCIUM SERPL-MCNC: 9.3 MG/DL — SIGNIFICANT CHANGE UP (ref 8.4–10.5)
CHLORIDE SERPL-SCNC: 88 MMOL/L — LOW (ref 96–108)
CHLORIDE SERPL-SCNC: 89 MMOL/L — LOW (ref 96–108)
CHLORIDE SERPL-SCNC: 91 MMOL/L — LOW (ref 96–108)
CK MB CFR SERPL CALC: 5 NG/ML — HIGH (ref 0–3.8)
CK SERPL-CCNC: 55 U/L — SIGNIFICANT CHANGE UP (ref 25–170)
CO2 SERPL-SCNC: 23 MMOL/L — SIGNIFICANT CHANGE UP (ref 22–31)
CO2 SERPL-SCNC: 23 MMOL/L — SIGNIFICANT CHANGE UP (ref 22–31)
CO2 SERPL-SCNC: 25 MMOL/L — SIGNIFICANT CHANGE UP (ref 22–31)
CREAT SERPL-MCNC: 2.49 MG/DL — HIGH (ref 0.5–1.3)
CREAT SERPL-MCNC: 2.59 MG/DL — HIGH (ref 0.5–1.3)
CREAT SERPL-MCNC: 2.64 MG/DL — HIGH (ref 0.5–1.3)
GLUCOSE SERPL-MCNC: 129 MG/DL — HIGH (ref 70–99)
GLUCOSE SERPL-MCNC: 130 MG/DL — HIGH (ref 70–99)
GLUCOSE SERPL-MCNC: 182 MG/DL — HIGH (ref 70–99)
HCT VFR BLD CALC: 46.3 % — HIGH (ref 34.5–45)
HCT VFR BLD CALC: 47.5 % — HIGH (ref 34.5–45)
HCT VFR BLD CALC: 47.8 % — HIGH (ref 34.5–45)
HGB BLD-MCNC: 14.9 G/DL — SIGNIFICANT CHANGE UP (ref 11.5–15.5)
HGB BLD-MCNC: 15 G/DL — SIGNIFICANT CHANGE UP (ref 11.5–15.5)
HGB BLD-MCNC: 15.1 G/DL — SIGNIFICANT CHANGE UP (ref 11.5–15.5)
MCHC RBC-ENTMCNC: 27.5 PG — SIGNIFICANT CHANGE UP (ref 27–34)
MCHC RBC-ENTMCNC: 27.7 PG — SIGNIFICANT CHANGE UP (ref 27–34)
MCHC RBC-ENTMCNC: 28.5 PG — SIGNIFICANT CHANGE UP (ref 27–34)
MCHC RBC-ENTMCNC: 31.4 GM/DL — LOW (ref 32–36)
MCHC RBC-ENTMCNC: 31.4 GM/DL — LOW (ref 32–36)
MCHC RBC-ENTMCNC: 32.6 GM/DL — SIGNIFICANT CHANGE UP (ref 32–36)
MCV RBC AUTO: 87.5 FL — SIGNIFICANT CHANGE UP (ref 80–100)
MCV RBC AUTO: 87.8 FL — SIGNIFICANT CHANGE UP (ref 80–100)
MCV RBC AUTO: 88.4 FL — SIGNIFICANT CHANGE UP (ref 80–100)
PA ADP PRP-ACNC: 83 PRU — LOW (ref 194–417)
PLATELET # BLD AUTO: 235 K/UL — SIGNIFICANT CHANGE UP (ref 150–400)
PLATELET # BLD AUTO: 254 K/UL — SIGNIFICANT CHANGE UP (ref 150–400)
PLATELET # BLD AUTO: 262 K/UL — SIGNIFICANT CHANGE UP (ref 150–400)
POTASSIUM SERPL-MCNC: 3.7 MMOL/L — SIGNIFICANT CHANGE UP (ref 3.5–5.3)
POTASSIUM SERPL-MCNC: 3.9 MMOL/L — SIGNIFICANT CHANGE UP (ref 3.5–5.3)
POTASSIUM SERPL-MCNC: 3.9 MMOL/L — SIGNIFICANT CHANGE UP (ref 3.5–5.3)
POTASSIUM SERPL-SCNC: 3.7 MMOL/L — SIGNIFICANT CHANGE UP (ref 3.5–5.3)
POTASSIUM SERPL-SCNC: 3.9 MMOL/L — SIGNIFICANT CHANGE UP (ref 3.5–5.3)
POTASSIUM SERPL-SCNC: 3.9 MMOL/L — SIGNIFICANT CHANGE UP (ref 3.5–5.3)
PROT SERPL-MCNC: 7.9 G/DL — SIGNIFICANT CHANGE UP (ref 6–8.3)
RBC # BLD: 5.3 M/UL — HIGH (ref 3.8–5.2)
RBC # BLD: 5.4 M/UL — HIGH (ref 3.8–5.2)
RBC # BLD: 5.41 M/UL — HIGH (ref 3.8–5.2)
RBC # FLD: 13.5 % — SIGNIFICANT CHANGE UP (ref 10.3–14.5)
RBC # FLD: 13.5 % — SIGNIFICANT CHANGE UP (ref 10.3–14.5)
RBC # FLD: 13.6 % — SIGNIFICANT CHANGE UP (ref 10.3–14.5)
SODIUM SERPL-SCNC: 129 MMOL/L — LOW (ref 135–145)
SODIUM SERPL-SCNC: 129 MMOL/L — LOW (ref 135–145)
SODIUM SERPL-SCNC: 132 MMOL/L — LOW (ref 135–145)
TROPONIN T, HIGH SENSITIVITY RESULT: 3126 NG/L — HIGH (ref 0–51)
WBC # BLD: 14 K/UL — HIGH (ref 3.8–10.5)
WBC # BLD: 15.1 K/UL — HIGH (ref 3.8–10.5)
WBC # BLD: 15.2 K/UL — HIGH (ref 3.8–10.5)
WBC # FLD AUTO: 14 K/UL — HIGH (ref 3.8–10.5)
WBC # FLD AUTO: 15.1 K/UL — HIGH (ref 3.8–10.5)
WBC # FLD AUTO: 15.2 K/UL — HIGH (ref 3.8–10.5)

## 2019-09-26 PROCEDURE — 99233 SBSQ HOSP IP/OBS HIGH 50: CPT | Mod: GC

## 2019-09-26 PROCEDURE — 78452 HT MUSCLE IMAGE SPECT MULT: CPT | Mod: 26

## 2019-09-26 RX ORDER — FUROSEMIDE 40 MG
40 TABLET ORAL DAILY
Refills: 0 | Status: DISCONTINUED | OUTPATIENT
Start: 2019-09-26 | End: 2019-09-27

## 2019-09-26 RX ORDER — HEPARIN SODIUM 5000 [USP'U]/ML
850 INJECTION INTRAVENOUS; SUBCUTANEOUS
Qty: 25000 | Refills: 0 | Status: DISCONTINUED | OUTPATIENT
Start: 2019-09-26 | End: 2019-09-27

## 2019-09-26 RX ORDER — METOPROLOL TARTRATE 50 MG
50 TABLET ORAL DAILY
Refills: 0 | Status: DISCONTINUED | OUTPATIENT
Start: 2019-09-27 | End: 2019-10-01

## 2019-09-26 RX ORDER — HYDRALAZINE HCL 50 MG
37.5 TABLET ORAL THREE TIMES A DAY
Refills: 0 | Status: DISCONTINUED | OUTPATIENT
Start: 2019-09-26 | End: 2019-09-28

## 2019-09-26 RX ADMIN — Medication 25 MILLIGRAM(S): at 14:41

## 2019-09-26 RX ADMIN — Medication 37.5 MILLIGRAM(S): at 21:22

## 2019-09-26 RX ADMIN — Medication 25 MILLIGRAM(S): at 05:57

## 2019-09-26 RX ADMIN — Medication 40 MILLIGRAM(S): at 05:57

## 2019-09-26 RX ADMIN — HEPARIN SODIUM 950 UNIT(S)/HR: 5000 INJECTION INTRAVENOUS; SUBCUTANEOUS at 18:50

## 2019-09-26 RX ADMIN — Medication 81 MILLIGRAM(S): at 05:59

## 2019-09-26 RX ADMIN — HEPARIN SODIUM 850 UNIT(S)/HR: 5000 INJECTION INTRAVENOUS; SUBCUTANEOUS at 10:57

## 2019-09-26 RX ADMIN — ATORVASTATIN CALCIUM 80 MILLIGRAM(S): 80 TABLET, FILM COATED ORAL at 21:22

## 2019-09-26 RX ADMIN — HEPARIN SODIUM 750 UNIT(S)/HR: 5000 INJECTION INTRAVENOUS; SUBCUTANEOUS at 03:47

## 2019-09-26 NOTE — PROGRESS NOTE ADULT - ASSESSMENT
84 y/o [place] female (currently resides in [place] in the  visiting family) with PMHx of HTN who presented to the ED @ [facility] on 9/22 for ongoing dyspnea noted to have acute on chronic systolic heart failure and NSTEMI, transferred here for LHC/RHC evaluation, found to have triple vessel disease.

## 2019-09-26 NOTE — PROGRESS NOTE ADULT - PROBLEM SELECTOR PLAN 3
Afebrile, downtrending, suspect leukocytosis is reactive  - Blood cultures x2 - NGTD.   - F/u UA.   - CXR without consolidations.   - Monitor off antibiotics for now Afebrile, fluctuating, suspect leukocytosis is reactive  - Blood cultures x2 - NGTD.   - UA negative.   - CXR without consolidations.   - Monitor off antibiotics for now

## 2019-09-26 NOTE — PROGRESS NOTE ADULT - PROBLEM SELECTOR PLAN 6
Diet: DASH/TLC  DVT Prophylaxis: Heparin gtt  Dispo: PT consulted Diet: DASH/TLC. -Nutrition eval.   DVT Prophylaxis: Heparin gtt  Dispo: PT consulted

## 2019-09-26 NOTE — PROGRESS NOTE ADULT - SUBJECTIVE AND OBJECTIVE BOX
No events overnight. Denies CP, SOB, palp.    MEDICATIONS:  aspirin enteric coated 81 milliGRAM(s) Oral daily  furosemide    Tablet 40 milliGRAM(s) Oral daily  heparin  Infusion. 850 Unit(s)/Hr IV Continuous <Continuous>  heparin  Injectable 3800 Unit(s) IV Push every 6 hours PRN  hydrALAZINE 37.5 milliGRAM(s) Oral three times a day  atorvastatin 80 milliGRAM(s) Oral at bedtime        REVIEW OF SYSTEMS:    CONSTITUTIONAL: No weakness, fevers or chills  EYES/ENT: No visual changes;  No dysphagia  RESPIRATORY: No cough, wheezing, hemoptysis; No shortness of breath  CARDIOVASCULAR: No chest pain or palpitations; No lower extremity edema  GASTROINTESTINAL: No abdominal or epigastric pain. No nausea, vomiting, or hematemesis  GENITOURINARY: No dysuria, frequency or hematuria  NEUROLOGICAL: No numbness or weakness  SKIN: No itching, burning, rashes, or lesions   HEME: No bleeding or bruising  MSK: No joint pains or muscle pains  All other review of systems is negative unless indicated above.    PHYSICAL EXAM:  T(C): 36.3 (09-26-19 @ 05:54), Max: 37.1 (09-25-19 @ 11:52)  HR: 91 (09-26-19 @ 05:54) (85 - 101)  BP: 107/73 (09-26-19 @ 05:54) (107/73 - 138/94)  RR: 17 (09-26-19 @ 05:54) (17 - 17)  SpO2: 95% (09-26-19 @ 05:54) (95% - 100%)  Wt(kg): --  I&O's Summary    25 Sep 2019 07:01  -  26 Sep 2019 07:00  --------------------------------------------------------  IN: 480 mL / OUT: 2050 mL / NET: -1570 mL    26 Sep 2019 07:01  -  26 Sep 2019 11:03  --------------------------------------------------------  IN: 120 mL / OUT: 0 mL / NET: 120 mL        Appearance: Normal	  HEENT:   Normal oral mucosa  Cardiovascular: Normal S1 S2, JVD ~10cm, No murmurs, No edema  Respiratory: Lungs clear to auscultation	  Psychiatry: A & O x 3, Mood & affect appropriate  Gastrointestinal:  Soft, Non-tender, + BS	  Skin: No rashes, No ecchymoses, No cyanosis	  Neurologic: Non-focal  Extremities: Normal range of motion, No clubbing, cyanosis        LABS:	 	    CBC Full  -  ( 26 Sep 2019 10:01 )  WBC Count : 14.0 K/uL  Hemoglobin : 14.9 g/dL  Hematocrit : 47.5 %  Platelet Count - Automated : 262 K/uL  Mean Cell Volume : 87.8 fl  Mean Cell Hemoglobin : 27.5 pg  Mean Cell Hemoglobin Concentration : 31.4 gm/dL  Auto Neutrophil # : x  Auto Lymphocyte # : x  Auto Monocyte # : x  Auto Eosinophil # : x  Auto Basophil # : x  Auto Neutrophil % : x  Auto Lymphocyte % : x  Auto Monocyte % : x  Auto Eosinophil % : x  Auto Basophil % : x    09-26    129<L>  |  89<L>  |  48<H>  ----------------------------<  129<H>  3.7   |  23  |  2.49<H>  09-26    132<L>  |  91<L>  |  50<H>  ----------------------------<  130<H>  3.9   |  25  |  2.64<H>    Ca    9.3      26 Sep 2019 10:01  Ca    9.1      26 Sep 2019 06:24  Phos  4.3     09-25  Mg     2.3     09-25    TPro  7.9  /  Alb  4.0  /  TBili  0.6  /  DBili  x   /  AST  31  /  ALT  32  /  AlkPhos  117  09-26  TPro  8.6<H>  /  Alb  4.3  /  TBili  0.7  /  DBili  x   /  AST  49<H>  /  ALT  37  /  AlkPhos  134<H>  09-25      proBNP: Serum Pro-Brain Natriuretic Peptide: 43220 pg/mL (09-25-19 @ 01:14)  Serum Pro-Brain Natriuretic Peptide: 32181 pg/mL (09-23-19 @ 18:45)

## 2019-09-26 NOTE — PROGRESS NOTE ADULT - PROBLEM SELECTOR PLAN 4
Unclear baseline, suspect combination of hemodynamic WINSTON as well as possible MARLENA  - Continue to trend  - Appreciate renal - likely due to contrast. Unclear baseline, suspect combination of hemodynamic WINSTON as well as possible MARLENA in setting of some likely underlying CKD.   - Continue to trend  -Renal US showed no hydronephrosis and only medical renal disease.   - Appreciate renal - likely due to contrast.  -Hyponatremia also noted; possibly due to contrast. Monitor.

## 2019-09-26 NOTE — PROGRESS NOTE ADULT - PROBLEM SELECTOR PLAN 2
- Lasix 40 mg po D  - Daily standing weights and strict I's/O's.  -Hydralazine added, per CHF for afterload reducing agent. - Lasix 40 mg po D  - Daily standing weights and strict I's/O's.  -Hydralazine uptitrating, per CHF for afterload reducing agent.

## 2019-09-26 NOTE — PROGRESS NOTE ADULT - ATTENDING COMMENTS
-Patient seen/examined on 9/26/19. Case/plan discussed with the intern and resident as reviewed/edited by me above and in any comments below.   -Updated patient with family at bedside.

## 2019-09-26 NOTE — PROGRESS NOTE ADULT - PROBLEM SELECTOR PLAN 1
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  - Cardiac viability study to elucidate possible benefits of revascularization.   - Will proceed with pre-op workup including P2Y12 for plavix activity and medical optimization per cardiology  - Heparin gtt given substantial triple vessel disease, per cardiology recommendations  - ASA 81mg q Day  - Atorvastatin 80 mg q Day  - Metoprolol tartrate 25 mg po BID  - Holding Plavix i/s/o possible CABG. Continue to trend P2Y12 levels  - Lipid, TSH, and A1c with am labs  -Trend cardiac enzymes  -C/w telemetry.  - Hydralazine titrated per HF reccomendations Patient found to have triple vessel disease on recent cath  - CT surgery consulted.  - Cardiac viability study to elucidate possible benefits of revascularization.   - Will proceed with pre-op workup including P2Y12 for plavix activity and medical optimization per cardiology  - Heparin gtt given substantial triple vessel disease, per cardiology recommendations  - ASA 81mg q Day  - Atorvastatin 80 mg q Day  - Metoprolol tartrate 25 mg po BID; will convert to metoprolol ER 50mg daily.   - Holding Plavix i/s/o possible CABG. Continue to trend P2Y12 levels  - Lipid panel with elevated total cholesterol, TSH low will repeat and send free T4, and A1c 6.1 prediabetic with am labs  -Trend cardiac enzymes  -C/w telemetry.  - Hydralazine titrated to 37.5mg TID per HF recommendations

## 2019-09-26 NOTE — PROGRESS NOTE ADULT - PROBLEM SELECTOR PLAN 5
Per chart review, patient on amlodipine 5 mg q Day at home  - Hold amlodipine.  -Hydralazine Per chart review, patient on amlodipine 5 mg q Day at home  - Hold amlodipine.  -Hydralazine and metoprolol for now.

## 2019-09-26 NOTE — PROGRESS NOTE ADULT - ATTENDING COMMENTS
My overall assessment is that this is a 84 YO F with a history of ACC/AHA Stage C ischemic cardiomyopathy, HTN, and CKD III (baseline Cr 1.8) who presented with NSTEMI and found to have 3v CAD now awaiting revascularization options for which heart failure cardiology consulted for further management. She is mildly overloaded and does not have reduced cardiac output on invasive hemodynamics despite very high SVR at time of assessment. She has room for afterload reduction and no prior HF hospitalizations. Would proceed with revascularization while optimizing medical therapy.     Review of pertinent studies reveals:  NM viability: gated EF 37%, mostly viable myocardium aside from distal anteroseptal/anterior/inferolateral/apical walls which have minimal viability     TTE 9/24/19: very limited images (only contrast enhanced images helpful), LV non-dilated, EF 30%, akinesis of inferolateral wall and apex    LHC 9/24/19: severe diffuse mid LAD stenosis, OM2 subtotally occluded, ramus occluded, RCA occluded    RHC 9/24/19: RA 10, PA 48/23 (34), PCWP 29, Sherry CO/CI 3.7/2.2,  with SVR 1990      The plan for today is as follows:  - increase hydralizine to 37.5 mg TID and uptitrate as allows. switch metoprolol to 50 mg succinate daily. holding RAAS inhibitor with WINSTON  - switch lasix to 40 mg PO daily  - viability study showing mostly viable myocardium, will discuss optimal revascularization strategy with interventional/surgery   - continue ASA/statin  - start P2Y12 inhibitor if she will not undergo surgery for treatment of NSTEMI  - continue to watch Cr, has WINSTON likely from contrast     Please feel free to call me with any questions: [phone]

## 2019-09-26 NOTE — PROGRESS NOTE ADULT - ASSESSMENT
ASSESSMENT/PLAN: 	  84 yo F with HTN, HF who presented to OSH with CP and SOB for the last few weeks. She had no EKG changes but noted to have trop of 1.38 and she was transferred to [facility] for cardiac cath, found to have triple vessel disease, pending CABG evaluation, as well as elevated filling pressures on RHC. Now near euvolemic after diuresis.     1. Ischemic cardiomyopathy, EF 30%  - RHC w/ elevated filling pressures, PCWP 29, w/ good CO/CI (3.6/2.2) and elevated SVR (2000)  - pending viability study for potential CABG given severe triple vessel disease  - switch to PO lasix 40mg daily  - increase hydralazine to 37.5mg TID, hold for SBP<90  - switch metop to succinate 50mg daily  - strict I/Os, daily standing weights    2. CAD  - s/p cath with TVD  - pending viability study to determine if CABG vs PCI is suitable  - c/w asa, atorvastatin 80mg daily, hep gtt x48h    3. HTN  - elevated, c/w BB, lasix    4. WINSTON on CKD  - Cr worsening, baseline 1.9 per family  - likely due to contrast load from cath and CT PE at OSH  - hold ARB      Adalid Newton MD  Cardiology Fellow   [phone], M-F 7:30A-5P    All Cardiology service information can be found on [email or web address], password: [email or web address].

## 2019-09-27 DIAGNOSIS — E87.1 HYPO-OSMOLALITY AND HYPONATREMIA: ICD-10-CM

## 2019-09-27 LAB
ALBUMIN SERPL ELPH-MCNC: 4 G/DL — SIGNIFICANT CHANGE UP (ref 3.3–5)
ALP SERPL-CCNC: 98 U/L — SIGNIFICANT CHANGE UP (ref 40–120)
ALT FLD-CCNC: 30 U/L — SIGNIFICANT CHANGE UP (ref 10–45)
ANION GAP SERPL CALC-SCNC: 14 MMOL/L — SIGNIFICANT CHANGE UP (ref 5–17)
ANION GAP SERPL CALC-SCNC: 15 MMOL/L — SIGNIFICANT CHANGE UP (ref 5–17)
APTT BLD: 59.2 SEC — HIGH (ref 27.5–36.3)
APTT BLD: 66.8 SEC — HIGH (ref 27.5–36.3)
AST SERPL-CCNC: 34 U/L — SIGNIFICANT CHANGE UP (ref 10–40)
BILIRUB SERPL-MCNC: 0.6 MG/DL — SIGNIFICANT CHANGE UP (ref 0.2–1.2)
BUN SERPL-MCNC: 48 MG/DL — HIGH (ref 7–23)
BUN SERPL-MCNC: 53 MG/DL — HIGH (ref 7–23)
CALCIUM SERPL-MCNC: 9.1 MG/DL — SIGNIFICANT CHANGE UP (ref 8.4–10.5)
CALCIUM SERPL-MCNC: 9.2 MG/DL — SIGNIFICANT CHANGE UP (ref 8.4–10.5)
CHLORIDE SERPL-SCNC: 88 MMOL/L — LOW (ref 96–108)
CHLORIDE SERPL-SCNC: 88 MMOL/L — LOW (ref 96–108)
CO2 SERPL-SCNC: 24 MMOL/L — SIGNIFICANT CHANGE UP (ref 22–31)
CO2 SERPL-SCNC: 24 MMOL/L — SIGNIFICANT CHANGE UP (ref 22–31)
CORTIS AM PEAK SERPL-MCNC: 14 UG/DL — SIGNIFICANT CHANGE UP (ref 6–18.4)
CREAT SERPL-MCNC: 2.52 MG/DL — HIGH (ref 0.5–1.3)
CREAT SERPL-MCNC: 2.77 MG/DL — HIGH (ref 0.5–1.3)
GLUCOSE SERPL-MCNC: 122 MG/DL — HIGH (ref 70–99)
GLUCOSE SERPL-MCNC: 139 MG/DL — HIGH (ref 70–99)
HCT VFR BLD CALC: 44.7 % — SIGNIFICANT CHANGE UP (ref 34.5–45)
HGB BLD-MCNC: 14.5 G/DL — SIGNIFICANT CHANGE UP (ref 11.5–15.5)
MAGNESIUM SERPL-MCNC: 2.3 MG/DL — SIGNIFICANT CHANGE UP (ref 1.6–2.6)
MCHC RBC-ENTMCNC: 28.2 PG — SIGNIFICANT CHANGE UP (ref 27–34)
MCHC RBC-ENTMCNC: 32.4 GM/DL — SIGNIFICANT CHANGE UP (ref 32–36)
MCV RBC AUTO: 87.2 FL — SIGNIFICANT CHANGE UP (ref 80–100)
PA ADP PRP-ACNC: 115 PRU — LOW (ref 194–417)
PHOSPHATE SERPL-MCNC: 4.6 MG/DL — HIGH (ref 2.5–4.5)
PLATELET # BLD AUTO: 245 K/UL — SIGNIFICANT CHANGE UP (ref 150–400)
POTASSIUM SERPL-MCNC: 3.6 MMOL/L — SIGNIFICANT CHANGE UP (ref 3.5–5.3)
POTASSIUM SERPL-MCNC: 4 MMOL/L — SIGNIFICANT CHANGE UP (ref 3.5–5.3)
POTASSIUM SERPL-SCNC: 3.6 MMOL/L — SIGNIFICANT CHANGE UP (ref 3.5–5.3)
POTASSIUM SERPL-SCNC: 4 MMOL/L — SIGNIFICANT CHANGE UP (ref 3.5–5.3)
PROT SERPL-MCNC: 7.7 G/DL — SIGNIFICANT CHANGE UP (ref 6–8.3)
RBC # BLD: 5.13 M/UL — SIGNIFICANT CHANGE UP (ref 3.8–5.2)
RBC # FLD: 13.4 % — SIGNIFICANT CHANGE UP (ref 10.3–14.5)
SODIUM SERPL-SCNC: 126 MMOL/L — LOW (ref 135–145)
SODIUM SERPL-SCNC: 127 MMOL/L — LOW (ref 135–145)
T4 FREE SERPL-MCNC: 1.9 NG/DL — HIGH (ref 0.9–1.8)
TROPONIN T, HIGH SENSITIVITY RESULT: 2302 NG/L — HIGH (ref 0–51)
TSH SERPL-MCNC: 0.19 UIU/ML — LOW (ref 0.27–4.2)
WBC # BLD: 12.9 K/UL — HIGH (ref 3.8–10.5)
WBC # FLD AUTO: 12.9 K/UL — HIGH (ref 3.8–10.5)

## 2019-09-27 PROCEDURE — 99233 SBSQ HOSP IP/OBS HIGH 50: CPT | Mod: GC

## 2019-09-27 PROCEDURE — 99232 SBSQ HOSP IP/OBS MODERATE 35: CPT | Mod: GC

## 2019-09-27 RX ORDER — HEPARIN SODIUM 5000 [USP'U]/ML
5000 INJECTION INTRAVENOUS; SUBCUTANEOUS
Refills: 0 | Status: DISCONTINUED | OUTPATIENT
Start: 2019-09-27 | End: 2019-10-04

## 2019-09-27 RX ORDER — HEPARIN SODIUM 5000 [USP'U]/ML
3800 INJECTION INTRAVENOUS; SUBCUTANEOUS EVERY 6 HOURS
Refills: 0 | Status: DISCONTINUED | OUTPATIENT
Start: 2019-09-27 | End: 2019-09-27

## 2019-09-27 RX ORDER — HEPARIN SODIUM 5000 [USP'U]/ML
950 INJECTION INTRAVENOUS; SUBCUTANEOUS
Qty: 25000 | Refills: 0 | Status: DISCONTINUED | OUTPATIENT
Start: 2019-09-27 | End: 2019-09-27

## 2019-09-27 RX ORDER — ACETAMINOPHEN 500 MG
650 TABLET ORAL EVERY 6 HOURS
Refills: 0 | Status: DISCONTINUED | OUTPATIENT
Start: 2019-09-27 | End: 2019-10-04

## 2019-09-27 RX ADMIN — Medication 40 MILLIGRAM(S): at 06:38

## 2019-09-27 RX ADMIN — Medication 650 MILLIGRAM(S): at 10:08

## 2019-09-27 RX ADMIN — Medication 37.5 MILLIGRAM(S): at 06:38

## 2019-09-27 RX ADMIN — Medication 650 MILLIGRAM(S): at 11:07

## 2019-09-27 RX ADMIN — Medication 37.5 MILLIGRAM(S): at 14:55

## 2019-09-27 RX ADMIN — Medication 37.5 MILLIGRAM(S): at 21:47

## 2019-09-27 RX ADMIN — Medication 81 MILLIGRAM(S): at 11:18

## 2019-09-27 RX ADMIN — Medication 50 MILLIGRAM(S): at 06:38

## 2019-09-27 RX ADMIN — HEPARIN SODIUM 950 UNIT(S)/HR: 5000 INJECTION INTRAVENOUS; SUBCUTANEOUS at 09:17

## 2019-09-27 RX ADMIN — ATORVASTATIN CALCIUM 80 MILLIGRAM(S): 80 TABLET, FILM COATED ORAL at 21:47

## 2019-09-27 RX ADMIN — HEPARIN SODIUM 950 UNIT(S)/HR: 5000 INJECTION INTRAVENOUS; SUBCUTANEOUS at 02:32

## 2019-09-27 NOTE — DIETITIAN INITIAL EVALUATION ADULT. - PROBLEM SELECTOR PLAN 2
-Presented with acute systolic heart failure. Unclear chronicity.   Echo showing Severely reduced left ventricular systolic function.  Akinesis of the inferolateral wall. Large area of apical  akinesis, s/p 2 L diuresis  - Lasix 40 mg IV BID  - Daily standing weights and strict I's/O's.  -Consider afterload reducing agent.

## 2019-09-27 NOTE — PROGRESS NOTE ADULT - PROBLEM SELECTOR PLAN 3
Patient on hydralazine 37.5 mg, metoprolol 50 mg daily, lasix. Consider holding Lasix for now as patient appears euvolemic. Avoid hypotensive episodes.

## 2019-09-27 NOTE — PHYSICAL THERAPY INITIAL EVALUATION ADULT - ADDITIONAL COMMENTS
Pt states through  she did not use an assist device pta. Defers answering PLOF and home situation to dtr. Will f/u upon family arrival to unit later in day

## 2019-09-27 NOTE — PROGRESS NOTE ADULT - PROBLEM SELECTOR PLAN 3
Afebrile, fluctuating, suspect leukocytosis is reactive  - Blood cultures x2 - NGTD.   - UA negative.   - CXR without consolidations.   - Monitor off antibiotics for now

## 2019-09-27 NOTE — PROGRESS NOTE ADULT - ATTENDING COMMENTS
-Patient seen/examined on 9/27/19. Case/plan discussed with the intern as reviewed/edited by me above and in any comments below.  -Spoke with patient's granddaughter and daughter. -Discussed with cardio NP.   -Family was still deciding if they want CABG or not. There is concern about her eventually travelling to [place].   -There is also concern that if patient has PCI, she may not be compliant with DAPT.

## 2019-09-27 NOTE — DIETITIAN INITIAL EVALUATION ADULT. - PROBLEM SELECTOR PLAN 4
Unclear baseline, suspect combination of hemodynamic WINSTON as well as possible MARLENA  - Continue to trend  - Urine lytes  - Bladder Scan  -Renal US.   -May need cardio renal consult.

## 2019-09-27 NOTE — DIETITIAN INITIAL EVALUATION ADULT. - OTHER INFO
Intake PTA: Per pt's granddaughter, pt eats well at home and "eating has never been a problem" for the patient. She states that pt has difficulty chewing foods due to poor dentition and is amenable for pt to receive mechanical soft diet textures. Pt is [place], therefore consumes a diet high in fat and carbohydrates. Per granddaughter, family prepares low-sodium foods for pt.    NKFA, no micronutrient supplementation PTA, pt unable to state if she has nausea/vomiting, last BM 9/27 per flow sheets, no difficulty swallowing, some difficulty chewing.    Diet: Per pt's granddaughter, pt has been eating food brought to her by family, good PO intake. Pt's granddaughter thinks pt would enjoy nutrition supplement Glucerna x1 daily.    Education: Pt's granddaughter declined heart healthy diet written education as she is a PA-C and familiar with recommendations. Verbal discussion took place regarding cholesterol levels and LDL levels, with recommendations for pt to increase intake of unsaturated, heart healthy fats, monitor sodium intake. Also discussed pt's A1c of 6.1% within diagnostic range for pre-DM. Recommended pt to limit carbohydrate portion sizes, increasing vegetable and protein intake, switching to whole grain naan and roti, pairing carbohydrates and protein with each meal. Pt's granddaughter states she will translate education to her grandmother.    Weight Hx:  Pt's daughter states pt is usually 140 pounds, however admits she does not check pt's weight at home. Pt admitted at 136 pounds (9/23). After diureses, pt is 127 pounds (9/27). Wt loss attributed to fluid loss and pt's granddaughter states that pt has not been eating less at baseline to indicate for lean body mass/fat loss.

## 2019-09-27 NOTE — DIETITIAN INITIAL EVALUATION ADULT. - PROBLEM SELECTOR PLAN 1
Patient found to have triple vessel disease on recent cath  - CT surgery consulted: family wishing to explore surgical vs non-surgical options at this time  - Family meeting at 11 am tomorrow to discuss CABG.  - Will proceed with pre-op workup including P2Y12 for plavix activity and medical optimization per cardiology  - Heparin gtt given substantial triple vessel disease, per cardiology recommendations  - ASA 81mg q Day  - Atorvastatin 80 mg q Day  - Metoprolol tartrate 25 mg po BID  - Holding Plavix i/s/o possible CABG. Continue to trend P2Y12 levels  - Lipid, TSH, and A1c with am labs  -Trend trops.   -C/w telemetry.

## 2019-09-27 NOTE — PROGRESS NOTE ADULT - PROBLEM SELECTOR PLAN 2
- Lasix 40 mg po D  - Daily standing weights and strict I's/O's.  -Hydralazine uptitrating, per CHF for afterload reducing agent. - Lasix 40 mg po D - held today in view of WINSTON.   - Daily standing weights and strict I's/O's.  -Hydralazine uptitrating, per CHF for afterload reducing agent.

## 2019-09-27 NOTE — PROGRESS NOTE ADULT - PROBLEM SELECTOR PLAN 2
Patient with euvolemic/hypervolemic hyponatremia. Would re-send urine osm and urine Na. Free water restriction for now to 1L and continue to monitor.

## 2019-09-27 NOTE — PROGRESS NOTE ADULT - SUBJECTIVE AND OBJECTIVE BOX
INTERVAL EVENTS: YI    SUBJECTIVE:  Used pacific   -Complains of low back pain. Denies chest pain or shortness of breath.    ROS:  General - No fevers or chills  Cards - No chest pain or palpitations  Pulm - No cough or shortness of breath  GI - No abdominal pain   - No dysuria    OBJECTIVE:  Vitals Last 24 hrs  Vital Signs Last 24 Hrs  T(C): 36.4 (27 Sep 2019 05:49), Max: 36.9 (26 Sep 2019 13:00)  T(F): 97.6 (27 Sep 2019 05:49), Max: 98.4 (26 Sep 2019 13:00)  HR: 102 (27 Sep 2019 05:49) (87 - 102)  BP: 125/74 (27 Sep 2019 05:49) (96/66 - 125/74)  BP(mean): --  RR: 17 (27 Sep 2019 05:49) (17 - 17)  SpO2: 98% (27 Sep 2019 05:49) (95% - 98%)    IOs  I&O's Summary    26 Sep 2019 07:01  -  27 Sep 2019 07:00  --------------------------------------------------------  IN: 380 mL / OUT: 1250 mL / NET: -870 mL        PE:  General - In bed, sleeping. Awakes easily.  Heart - Normal S1 and S2. No murmurs auscultated.  Lungs - Clear to auscultation bilaterally  GI - Abdomen soft, NT, ND.  Extremities - No lower extremity edema. Peripheral pulses intact. Calves nontender.  Skin - No rashes on extremities  Neuro - AOx2 (self, hospital, "I haven't been outside so I don't know what season it is), appropriately conversant.    LABS:  ***********        IMAGING:  < from: Cardiac Viability (09.25.19 @ 12:28) >  IMPRESSIONS:Abnormal Study  * The left ventricle was normal in size.  * The anterolateral, basal anteroseptal, basal anterior,  and inferoseptal walls demonstrate mostly preserved  perfusion on rest imaging suggestive of viable myocardium.  * There are moderate defects in the mid anterior, mid  anteroseptal, inferior, and basal to mid inferolateral  walls that demonstrate improved perfusion in some segments  on delay imaging suggestive of moderate residual  viability.  * There are severe defects in the distal anteroseptal,  distal anterior, distal inferolateral, and apical walls  that are mostly fixed on delay imaging suggestive of  infarct with minimal residual viability.  * Rest gated wall motion analysis was performed (LVEF = 37  %;LVEDV = 69 ml.) revealing severe hypokinesis of the  distal anterior, distal anteroseptal, distal  inferolateral, and apical walls and mild to moderate  hypokinesis of the remaining segments.  *** No previous Nuclear/Stress exam.    < end of copied text > INTERVAL EVENTS: YI    SUBJECTIVE:  Used [place]ific   -Complains of low back pain. Denies chest pain or shortness of breath.    ROS:  General - No fevers or chills  Cards - No chest pain or palpitations  Pulm - No cough or shortness of breath  GI - No abdominal pain   - No dysuria    OBJECTIVE:  Vitals Last 24 hrs  Vital Signs Last 24 Hrs  T(C): 36.4 (27 Sep 2019 05:49), Max: 36.9 (26 Sep 2019 13:00)  T(F): 97.6 (27 Sep 2019 05:49), Max: 98.4 (26 Sep 2019 13:00)  HR: 102 (27 Sep 2019 05:49) (87 - 102)  BP: 125/74 (27 Sep 2019 05:49) (96/66 - 125/74)  BP(mean): --  RR: 17 (27 Sep 2019 05:49) (17 - 17)  SpO2: 98% (27 Sep 2019 05:49) (95% - 98%)    IOs  I&O's Summary    26 Sep 2019 07:01  -  27 Sep 2019 07:00  --------------------------------------------------------  IN: 380 mL / OUT: 1250 mL / NET: -870 mL        PE:  General - In bed, sleeping. Awakes easily.  Heart - Normal S1 and S2. No murmurs auscultated.  Lungs - Clear to auscultation bilaterally  GI - Abdomen soft, NT, ND.  Extremities - No lower extremity edema. Peripheral pulses intact. Calves nontender.  Skin - No rashes on extremities  Neuro - AOx2 (self, hospital, "I haven't been outside so I don't know what season it is), appropriately conversant.    LABS:                        14.5   12.9  )-----------( 245      ( 27 Sep 2019 08:49 )             44.7   09-27    126<L>  |  88<L>  |  48<H>  ----------------------------<  139<H>  3.6   |  24  |  2.52<H>    Ca    9.1      27 Sep 2019 08:49  Phos  4.6     09-27  Mg     2.3     09-27    TPro  7.7  /  Alb  4.0  /  TBili  0.6  /  DBili  x   /  AST  34  /  ALT  30  /  AlkPhos  98  09-27  CARDIAC MARKERS ( 26 Sep 2019 06:24 )  x     / x     / 55 U/L / x     / 5.0 ng/mL    Troponins     2302 < 3126    IMAGING:  < from: Cardiac Viability (09.25.19 @ 12:28) >  IMPRESSIONS:Abnormal Study  * The left ventricle was normal in size.  * The anterolateral, basal anteroseptal, basal anterior,  and inferoseptal walls demonstrate mostly preserved  perfusion on rest imaging suggestive of viable myocardium.  * There are moderate defects in the mid anterior, mid  anteroseptal, inferior, and basal to mid inferolateral  walls that demonstrate improved perfusion in some segments  on delay imaging suggestive of moderate residual  viability.  * There are severe defects in the distal anteroseptal,  distal anterior, distal inferolateral, and apical walls  that are mostly fixed on delay imaging suggestive of  infarct with minimal residual viability.  * Rest gated wall motion analysis was performed (LVEF = 37  %;LVEDV = 69 ml.) revealing severe hypokinesis of the  distal anterior, distal anteroseptal, distal  inferolateral, and apical walls and mild to moderate  hypokinesis of the remaining segments.  *** No previous Nuclear/Stress exam.    < end of copied text >

## 2019-09-27 NOTE — PROGRESS NOTE ADULT - PROBLEM SELECTOR PLAN 7
Diet: DASH/TLC. -Nutrition eval.   DVT Prophylaxis: Heparin gtt  Dispo: PT consulted Diet: Mechanical soft low fat diet with Glucerna Halal fluid restricted. -Nutrition eval appreciated.   DVT Prophylaxis: Heparin SQ for now.   Dispo: PT recs home with assist.

## 2019-09-27 NOTE — PROGRESS NOTE ADULT - PROBLEM SELECTOR PLAN 1
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  - Cardiac viability shows moderate residual viability   - Will proceed with pre-op workup including P2Y12 for plavix activity and medical optimization per cardiology  - Heparin gtt given substantial triple vessel disease, per cardiology recommendations  - ASA 81mg q Day  - Atorvastatin 80 mg q Day  - Metoprolol tartrate 25 mg po BID; will convert to metoprolol ER 50mg daily.   - Holding Plavix i/s/o possible CABG. Continue to trend P2Y12 levels  - Lipid panel with elevated total cholesterol, TSH low will repeat and send free T4, and A1c 6.1 prediabetic with am labs  -Trend cardiac enzymes  -C/w telemetry.  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  - Cardiac viability shows moderate residual viability. F/uing up with Dr. Wick and heart failure team about next steps for intervention.  - Will proceed with pre-op workup including P2Y12 for plavix activity and medical optimization per cardiology  - Heparin gtt given substantial triple vessel disease, per cardiology recommendations  - ASA 81mg q Day  - Atorvastatin 80 mg q Day  - Metoprolol tartrate 25 mg po BID; will convert to metoprolol ER 50mg daily.   - Holding Plavix i/s/o possible CABG. Continue to trend P2Y12 levels  - Lipid panel with elevated total cholesterol, TSH low will repeat and send free T4, and A1c 6.1 prediabetic with am labs  -Trend cardiac enzymes  -C/w telemetry.  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  - Cardiac viability shows moderate residual viability. F/uing up with Dr. Wick and heart failure team about next steps for intervention. Discussed with family; they are still discussing.  - Will proceed with pre-op workup including P2Y12 for plavix activity and medical optimization per cardiology  - Heparin gtt given substantial triple vessel disease, per cardiology recommendations  - ASA 81mg q Day  - Atorvastatin 80 mg q Day  - Metoprolol tartrate 25 mg po BID; will convert to metoprolol ER 50mg daily.   - Holding Plavix i/s/o possible CABG. Continue to trend P2Y12 levels  - Lipid panel with elevated total cholesterol, TSH low will repeat and send free T4, and A1c 6.1 prediabetic with am labs  -Trend cardiac enzymes  -C/w telemetry.  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  - Cardiac viability shows moderate residual viability. F/uing up with Dr. Wick and heart failure team about next steps for intervention. Discussed with family; they are still discussing.  - Will proceed with pre-op workup including P2Y12 for plavix activity and medical optimization per cardiology  - Heparin gtt given substantial triple vessel disease, per cardiology recommendations -Per cardio, can stop today.   - ASA 81mg q Day  - Atorvastatin 80 mg q Day  - Metoprolol tartrate 25 mg po BID; converted to metoprolol ER 50mg daily.   - Holding Plavix i/s/o possible CABG. Continue to trend P2Y12 levels  - Lipid panel with elevated total cholesterol, TSH low and free T4 a little high, and A1c 6.1 prediabetic with am labs  -Abnormal TFT's possibly due to euthyroid sick syndrome. Will need follow up.   -Trend cardiac enzymes  -C/w telemetry.  - Hydralazine titrated to 37.5mg TID per HF recommendations

## 2019-09-27 NOTE — PROGRESS NOTE ADULT - ASSESSMENT
ASSESSMENT/PLAN: 	  84 yo F with HTN, HF who presented to OSH with CP and SOB for the last few weeks. She had no EKG changes but noted to have trop of 1.38 and she was transferred to [facility] for cardiac cath, found to have triple vessel disease, pending CABG evaluation, as well as elevated filling pressures on RHC. Now near euvolemic after diuresis.     1. Ischemic cardiomyopathy, EF 30%  - hold lasix for now, euvolemic to dry today  - c/w hydralazine to 37.5mg TID, hold for SBP<90  - c/w metop to succinate 50mg daily  - strict I/Os, daily standing weights    2. CAD  - s/p cath with TVD, viability study showing significant viable tissue  - CTS and interventional cards to review imaging and decide on best revascularization strategy  - c/w asa, atorvastatin 80mg daily  - d/c hep gtt after 48h of treatment (later today)    3. HTN  - elevated, c/w BB    4. WINSTON on CKD  - Cr worsening, baseline 1.9 per family  - likely due to contrast load from cath and CT PE at OSH  - hold ARB      Adalid Newton MD  Cardiology Fellow   [phone], M-F 7:30A-5P    All Cardiology service information can be found on [email or web address], password: [email or web address].

## 2019-09-27 NOTE — DIETITIAN INITIAL EVALUATION ADULT. - PROBLEM SELECTOR PLAN 6
Diet: DASH/TLC  DVT Prophylaxis: Heparin gtt  Dispo: PT consulted    Norah Biggs  PGY-2 Internal Medicine  Pager: [phone] (NS)/[phone] (DANNY)

## 2019-09-27 NOTE — PROGRESS NOTE ADULT - PROBLEM SELECTOR PLAN 6
Euvolemic hyponatremia  -Send urine and serum osms Euvolemic hyponatremia. Possibly due to recent contrast.   -Send urine and serum osms and urine Na.   -Renal recs appreciated. Fluid restrict to 1L per day.   -Monitor BMP.  -Will liberalize diet.

## 2019-09-27 NOTE — PROGRESS NOTE ADULT - ASSESSMENT
84 y/o [place] female (currently resides in [place] in the  visiting family) with PMHx of HTN who presented to the ED @ [facility] on 9/22 for ongoing dyspnea noted to have acute on chronic systolic heart failure and NSTEMI, transferred here for LHC/RHC evaluation, found to have triple vessel disease.

## 2019-09-27 NOTE — PROGRESS NOTE ADULT - SUBJECTIVE AND OBJECTIVE BOX
No events overnight. Denies CP, SOB, palp.    MEDICATIONS:  aspirin enteric coated 81 milliGRAM(s) Oral daily  furosemide    Tablet 40 milliGRAM(s) Oral daily  heparin  Infusion. 950 Unit(s)/Hr IV Continuous <Continuous>  heparin  Injectable 3800 Unit(s) IV Push every 6 hours PRN  hydrALAZINE 37.5 milliGRAM(s) Oral three times a day  metoprolol succinate ER 50 milliGRAM(s) Oral daily  acetaminophen   Tablet .. 650 milliGRAM(s) Oral every 6 hours PRN  atorvastatin 80 milliGRAM(s) Oral at bedtime        REVIEW OF SYSTEMS:    CONSTITUTIONAL: No weakness, fevers or chills  EYES/ENT: No visual changes;  No dysphagia  RESPIRATORY: No cough, wheezing, hemoptysis; No shortness of breath  CARDIOVASCULAR: No chest pain or palpitations; No lower extremity edema  GASTROINTESTINAL: No abdominal or epigastric pain. No nausea, vomiting, or hematemesis  GENITOURINARY: No dysuria, frequency or hematuria  NEUROLOGICAL: No numbness or weakness  SKIN: No itching, burning, rashes, or lesions   HEME: No bleeding or bruising  MSK: No joint pains or muscle pains  All other review of systems is negative unless indicated above.    PHYSICAL EXAM:  T(C): 36.4 (09-27-19 @ 05:49), Max: 36.8 (09-26-19 @ 19:23)  HR: 86 (09-27-19 @ 08:50) (86 - 102)  BP: 119/69 (09-27-19 @ 08:50) (117/76 - 125/74)  RR: 17 (09-27-19 @ 08:50) (17 - 17)  SpO2: 98% (09-27-19 @ 08:50) (96% - 98%)  Wt(kg): --  I&O's Summary    26 Sep 2019 07:01  -  27 Sep 2019 07:00  --------------------------------------------------------  IN: 380 mL / OUT: 1250 mL / NET: -870 mL    27 Sep 2019 07:01  -  27 Sep 2019 13:20  --------------------------------------------------------  IN: 120 mL / OUT: 200 mL / NET: -80 mL        Appearance: Normal	  HEENT:   Normal oral mucosa  Cardiovascular: Normal S1 S2, [id number], No murmurs, No edema  Respiratory: Lungs clear to auscultation	  Psychiatry: A & O x 3, Mood & affect appropriate  Gastrointestinal:  Soft, Non-tender, + BS	  Skin: No rashes, No ecchymoses, No cyanosis	  Neurologic: Non-focal  Extremities: Normal range of motion, No clubbing, cyanosis        LABS:	 	    CBC Full  -  ( 27 Sep 2019 08:49 )  WBC Count : 12.9 K/uL  Hemoglobin : 14.5 g/dL  Hematocrit : 44.7 %  Platelet Count - Automated : 245 K/uL  Mean Cell Volume : 87.2 fl  Mean Cell Hemoglobin : 28.2 pg  Mean Cell Hemoglobin Concentration : 32.4 gm/dL  Auto Neutrophil # : x  Auto Lymphocyte # : x  Auto Monocyte # : x  Auto Eosinophil # : x  Auto Basophil # : x  Auto Neutrophil % : x  Auto Lymphocyte % : x  Auto Monocyte % : x  Auto Eosinophil % : x  Auto Basophil % : x    09-27    126<L>  |  88<L>  |  48<H>  ----------------------------<  139<H>  3.6   |  24  |  2.52<H>  09-27    127<L>  |  88<L>  |  53<H>  ----------------------------<  122<H>  4.0   |  24  |  2.77<H>    Ca    9.1      27 Sep 2019 08:49  Ca    9.2      27 Sep 2019 01:04  Phos  4.6     09-27  Mg     2.3     09-27    TPro  7.7  /  Alb  4.0  /  TBili  0.6  /  DBili  x   /  AST  34  /  ALT  30  /  AlkPhos  98  09-27  TPro  7.9  /  Alb  4.0  /  TBili  0.6  /  DBili  x   /  AST  31  /  ALT  32  /  AlkPhos  117  09-26      proBNP: Serum Pro-Brain Natriuretic Peptide: 50548 pg/mL (09-25-19 @ 01:14)  Serum Pro-Brain Natriuretic Peptide: 04726 pg/mL (09-23-19 @ 18:45)    PREVIOUS DIAGNOSTIC TESTING:    Viability study 9/26/19  IMPRESSIONS:Abnormal Study  * The left ventricle was normal in size.  * The anterolateral, basal anteroseptal, basal anterior,  and inferoseptal walls demonstrate mostly preserved  perfusion on rest imaging suggestive of viable myocardium.  * There are moderate defects in the mid anterior, mid  anteroseptal, inferior, and basal to mid inferolateral  walls that demonstrate improved perfusion in some segments  on delay imaging suggestive of moderate residual  viability.  * There are severe defects in the distal anteroseptal,  distal anterior, distal inferolateral, and apical walls  that are mostly fixed on delay imaging suggestive of  infarct with minimal residual viability.  * Rest gated wall motion analysis was performed (LVEF = 37  %;LVEDV = 69 ml.) revealing severe hypokinesis of the  distal anterior, distal anteroseptal, distal  inferolateral, and apical walls and mild to moderate  hypokinesis of the remaining segments.  *** No previous Nuclear/Stress exam.

## 2019-09-27 NOTE — PROGRESS NOTE ADULT - SUBJECTIVE AND OBJECTIVE BOX
[facility] Division of Kidney Diseases & Hypertension  FOLLOW UP NOTE  [phone]--------------------------------------------------------------------------------  Chief Complaint:Non-ST elevation myocardial infarction (NSTEMI)      24 hour events/subjective: No acute events overnight. Patient resting comfortably in bed. Labs and vital signs reviewed.         PAST HISTORY  --------------------------------------------------------------------------------  No significant changes to PMH, PSH, FHx, SHx, unless otherwise noted    ALLERGIES & MEDICATIONS  --------------------------------------------------------------------------------  Allergies    No Known Allergies    Intolerances      Standing Inpatient Medications  aspirin enteric coated 81 milliGRAM(s) Oral daily  atorvastatin 80 milliGRAM(s) Oral at bedtime  furosemide    Tablet 40 milliGRAM(s) Oral daily  heparin  Infusion. 950 Unit(s)/Hr IV Continuous <Continuous>  hydrALAZINE 37.5 milliGRAM(s) Oral three times a day  metoprolol succinate ER 50 milliGRAM(s) Oral daily    PRN Inpatient Medications  acetaminophen   Tablet .. 650 milliGRAM(s) Oral every 6 hours PRN  heparin  Injectable 3800 Unit(s) IV Push every 6 hours PRN      REVIEW OF SYSTEMS  --------------------------------------------------------------------------------  Gen: No  fevers/chills  Skin: No rashes  Head/Eyes/Ears/Mouth: No headache; Normal hearing; Normal vision w/o blurriness  Respiratory: No dyspnea, cough, wheezing, hemoptysis  CV: No chest pain, PND, orthopnea  GI: No abdominal pain, diarrhea, constipation, nausea, vomiting  : No increased frequency, dysuria, hematuria, nocturia  MSK: No joint pain/swelling; no back pain; no edema  Neuro: No dizziness/lightheadedness, weakness, seizures, numbness, tingling      All other systems were reviewed and are negative, except as noted.    VITALS/PHYSICAL EXAM  --------------------------------------------------------------------------------  T(C): 36.4 (09-27-19 @ 05:49), Max: 36.8 (09-26-19 @ 19:23)  HR: 86 (09-27-19 @ 08:50) (86 - 102)  BP: 119/69 (09-27-19 @ 08:50) (117/76 - 125/74)  RR: 17 (09-27-19 @ 08:50) (17 - 17)  SpO2: 98% (09-27-19 @ 08:50) (96% - 98%)  Wt(kg): --        09-26-19 @ 07:01  -  09-27-19 @ 07:00  --------------------------------------------------------  IN: 380 mL / OUT: 1250 mL / NET: -870 mL    09-27-19 @ 07:01  -  09-27-19 @ 13:28  --------------------------------------------------------  IN: 120 mL / OUT: 200 mL / NET: -80 mL      Physical Exam:  	Gen: NAD, well-appearing  	HEENT: PERRL, supple neck  	Pulm: CTA B/L  	CV:  S1S2  	Abd: +BS, soft   	MSK: No B/L Lower ext edema  	Neuro: No focal deficits  	Skin: Warm, without rashes  	Vascular: No cyanosis    LABS/STUDIES  --------------------------------------------------------------------------------              14.5   12.9  >-----------<  245      [09-27-19 @ 08:49]              44.7     126  |  88  |  48  ----------------------------<  139      [09-27-19 @ 08:49]  3.6   |  24  |  2.52        Ca     9.1     [09-27-19 @ 08:49]      Mg     2.3     [09-27-19 @ 08:49]      Phos  4.6     [09-27-19 @ 08:49]    TPro  7.7  /  Alb  4.0  /  TBili  0.6  /  DBili  x   /  AST  34  /  ALT  30  /  AlkPhos  98  [09-27-19 @ 08:49]      PTT: 66.8       [09-27-19 @ 08:49]    CK 55      [09-26-19 @ 06:24]    Creatinine Trend:  SCr 2.52 [09-27 @ 08:49]  SCr 2.77 [09-27 @ 01:04]  SCr 2.49 [09-26 @ 10:01]  SCr 2.64 [09-26 @ 06:24]  SCr 2.59 [09-26 @ 03:12]    Urinalysis - [09-24-19 @ 20:59]      Color Colorless / Appearance Clear / SG 1.010 / pH 6.0      Gluc Negative / Ketone Negative  / Bili Negative / Urobili Negative       Blood Trace / Protein Trace / Leuk Est Negative / Nitrite Negative      RBC 3 / WBC 4 / Hyaline 1 / Gran  / Sq Epi  / Non Sq Epi 1 / Bacteria Negative    Urine Creatinine 16      [09-25-19 @ 01:54]  Urine Sodium 96      [09-25-19 @ 01:54]  Urine Urea Nitrogen 175      [09-25-19 @ 07:54]  Urine Chloride 84      [09-25-19 @ 01:54]  Urine Osmolality 282      [09-25-19 @ 01:57]    HbA1c 6.1      [09-25-19 @ 09:57]  TSH 0.22      [09-25-19 @ 08:44]  Lipid: chol 214, , HDL 78,       [09-25-19 @ 08:44]

## 2019-09-27 NOTE — PROGRESS NOTE ADULT - ATTENDING COMMENTS
My overall assessment is that this is a 82 YO F with a history of ACC/AHA Stage C ischemic cardiomyopathy, HTN, and CKD III (baseline Cr 1.8) who presented with NSTEMI and found to have 3v CAD now awaiting revascularization options for which heart failure cardiology consulted for further management. She is mildly overloaded and does not have reduced cardiac output on invasive hemodynamics despite very high SVR at time of assessment. She has room for afterload reduction and no prior HF hospitalizations. Would proceed with revascularization while optimizing medical therapy.     Review of pertinent studies reveals:  NM viability: gated EF 37%, mostly viable myocardium aside from distal anteroseptal/anterior/inferolateral/apical walls which have minimal viability     TTE 9/24/19: very limited images (only contrast enhanced images helpful), LV non-dilated, EF ~30%, akinesis of inferolateral wall and apex    LHC 9/24/19: severe diffuse mid LAD stenosis, OM2 subtotally occluded, ramus occluded, RCA occluded    RHC 9/24/19: RA 10, PA 48/23 (34), PCWP 29, Sherry CO/CI 3.7/2.2,  with SVR 1990      The plan for today is as follows:  - continue hydralizine to 37.5 mg TID and metoprolol succinate 50 mg daily. avoiding aggressive uptitration and RAAS inhibitor with WINSTON  - stop lasix, appears euvolemic   - viability study showing mostly viable myocardium, Dr. Wick and cardiac surgery discussing optimal revascularization strategy   - continue ASA/statin  - start P2Y12 inhibitor if she will not undergo surgery for treatment of NSTEMI  - continue to watch Cr, has WINSTON likely from contrast     Please feel free to call me with any questions: [phone]

## 2019-09-27 NOTE — PROGRESS NOTE ADULT - PROBLEM SELECTOR PLAN 1
Patient with WINSTON on CKD in the setting of [place] from recent cardiac catheterization. Patient with CKD likely due to uncontrolled HTN as patient had creatinine ~1.8 in 1/2018. Patient has bland urine sediment and U/S of the kidneys showing multiple renal cysts. Will continue to follow for now however for acute component will continue to monitor and recommend no additional contrast for at least 48-72 hrs. Otherwise avoid other nephrotoxic agents, avoid hypotensive episodes and renally dose all medications. Can hold Lasix today as well given patient appears euvolemic.

## 2019-09-27 NOTE — DIETITIAN INITIAL EVALUATION ADULT. - ADD RECOMMEND
1) Recommend consider fluid restriction as pt is hyponatremic with euvolemia 2) Glucerna x1 daily 3) Pt and pt's family are aware RD remains available for any concerns, questions or diet preferences 4) Monitor PO intake, GI tolerance and weights 1) Recommend consider liberalizing low-sodium diet as pt is hyponatremic 2) Glucerna x1 daily 3) Pt and pt's family are aware RD remains available for any concerns, questions or diet preferences 4) Monitor PO intake, GI tolerance and weights

## 2019-09-27 NOTE — PROGRESS NOTE ADULT - ASSESSMENT
83 year old female with history of HTN who presents to the hospital as a transfer from outside hospital with worsening shortness of breath and exertional dyspnea. Nephrology consulted for WINSTON on CKD.

## 2019-09-27 NOTE — DIETITIAN INITIAL EVALUATION ADULT. - REASON INDICATOR FOR ASSESSMENT
Nutrition consult received for education.   Source: pt's daughter and granddaughter at bedside, EMR. Pt resting at time of visit.  Per chart, 84 y/o female acute on chronic systolic heart failure and NSTEMI, transferred to [facility] for LHC/RHC evaluation, found to have triple vessel disease, WINSTON on CKD. Pending CABG evaluation. Pt currently with euvolemic hyponatremia  PMHx: HTN

## 2019-09-27 NOTE — DIETITIAN INITIAL EVALUATION ADULT. - PHYSICAL APPEARANCE
Pt unable to provide consent for nutrition-focused physical exam. Observed no overt muscle/fat wasting./well nourished

## 2019-09-27 NOTE — PROGRESS NOTE ADULT - PROBLEM SELECTOR PLAN 4
Unclear baseline, suspect combination of hemodynamic WINSTON as well as possible MARLENA in setting of some likely underlying CKD.   - Continue to trend  -Renal US showed no hydronephrosis and only medical renal disease.   - Appreciate renal - likely due to contrast. Unclear baseline, suspect combination of hemodynamic WINSOTN as well as possible MARLENA in setting of some likely underlying CKD.   - Continue to trend  -Renal US showed no hydronephrosis and only medical renal disease.   - Appreciate renal - likely due to contrast.  -Lasix on hold for now.

## 2019-09-27 NOTE — PROGRESS NOTE ADULT - PROBLEM SELECTOR PLAN 5
Per chart review, patient on amlodipine 5 mg q Day at home  - Hold amlodipine.  -Hydralazine and metoprolol for now. Per chart review, patient on amlodipine 5 mg q Day at home  - Hold amlodipine.  - Hydralazine and metoprolol for now. no abrasions, no jaundice, no lesions, no pruritis, and no rashes.

## 2019-09-27 NOTE — DIETITIAN INITIAL EVALUATION ADULT. - ENERGY NEEDS
Ht: 62 inches Wt: 127 pounds BMI: 23.2 kg/m2 IBW: 110 pounds(+/-10%) 115%IBW  no edema. no pressure ulcers documented.

## 2019-09-27 NOTE — PHYSICAL THERAPY INITIAL EVALUATION ADULT - PERTINENT HX OF CURRENT PROBLEM, REHAB EVAL
82 yo Urduh speaking Female w/ h/o ACC/AHA Stage C ischemic cardiomyopathy, HTN, and CKD III (baseline Cr 1.8) presented to OSH w/ CP & SOB x several weeks. (-) EKG changes but noted to have trop of 1.38. Pt transferred to [facility] for cardiac cath = +TVD now pending CABG evaluation. 82 yo Indonesian speaking Female w/ h/o ACC/AHA Stage C ischemic cardiomyopathy, HTN, and CKD III (baseline Cr 1.8) presented to OSH w/ CP & SOB x several weeks. (-) EKG changes but noted to have trop of 1.38. Pt transferred to NS for cardiac cath = +TVD now pending CABG evaluation.

## 2019-09-27 NOTE — DIETITIAN INITIAL EVALUATION ADULT. - PROBLEM SELECTOR PLAN 3
Afebrile, uptrending, suspect leukocytosis is reactive  - Blood cultures x2 - NGTD.   -F/u UA.   -CXR without consolidations.   - Monitor off antibiotics for now

## 2019-09-28 DIAGNOSIS — N17.9 ACUTE KIDNEY FAILURE, UNSPECIFIED: ICD-10-CM

## 2019-09-28 LAB
ALBUMIN SERPL ELPH-MCNC: 3.9 G/DL — SIGNIFICANT CHANGE UP (ref 3.3–5)
ALP SERPL-CCNC: 93 U/L — SIGNIFICANT CHANGE UP (ref 40–120)
ALT FLD-CCNC: 31 U/L — SIGNIFICANT CHANGE UP (ref 10–45)
ANION GAP SERPL CALC-SCNC: 12 MMOL/L — SIGNIFICANT CHANGE UP (ref 5–17)
ANION GAP SERPL CALC-SCNC: 13 MMOL/L — SIGNIFICANT CHANGE UP (ref 5–17)
ANION GAP SERPL CALC-SCNC: 14 MMOL/L — SIGNIFICANT CHANGE UP (ref 5–17)
ANION GAP SERPL CALC-SCNC: 15 MMOL/L — SIGNIFICANT CHANGE UP (ref 5–17)
AST SERPL-CCNC: 33 U/L — SIGNIFICANT CHANGE UP (ref 10–40)
BILIRUB SERPL-MCNC: 0.6 MG/DL — SIGNIFICANT CHANGE UP (ref 0.2–1.2)
BUN SERPL-MCNC: 47 MG/DL — HIGH (ref 7–23)
BUN SERPL-MCNC: 49 MG/DL — HIGH (ref 7–23)
BUN SERPL-MCNC: 49 MG/DL — HIGH (ref 7–23)
BUN SERPL-MCNC: 51 MG/DL — HIGH (ref 7–23)
CALCIUM SERPL-MCNC: 8.8 MG/DL — SIGNIFICANT CHANGE UP (ref 8.4–10.5)
CALCIUM SERPL-MCNC: 9 MG/DL — SIGNIFICANT CHANGE UP (ref 8.4–10.5)
CALCIUM SERPL-MCNC: 9.1 MG/DL — SIGNIFICANT CHANGE UP (ref 8.4–10.5)
CALCIUM SERPL-MCNC: 9.3 MG/DL — SIGNIFICANT CHANGE UP (ref 8.4–10.5)
CHLORIDE SERPL-SCNC: 85 MMOL/L — LOW (ref 96–108)
CHLORIDE SERPL-SCNC: 88 MMOL/L — LOW (ref 96–108)
CHLORIDE SERPL-SCNC: 89 MMOL/L — LOW (ref 96–108)
CHLORIDE SERPL-SCNC: 90 MMOL/L — LOW (ref 96–108)
CO2 SERPL-SCNC: 22 MMOL/L — SIGNIFICANT CHANGE UP (ref 22–31)
CO2 SERPL-SCNC: 23 MMOL/L — SIGNIFICANT CHANGE UP (ref 22–31)
CO2 SERPL-SCNC: 23 MMOL/L — SIGNIFICANT CHANGE UP (ref 22–31)
CO2 SERPL-SCNC: 24 MMOL/L — SIGNIFICANT CHANGE UP (ref 22–31)
CREAT SERPL-MCNC: 2.58 MG/DL — HIGH (ref 0.5–1.3)
CREAT SERPL-MCNC: 2.74 MG/DL — HIGH (ref 0.5–1.3)
CREAT SERPL-MCNC: 2.87 MG/DL — HIGH (ref 0.5–1.3)
CREAT SERPL-MCNC: 3 MG/DL — HIGH (ref 0.5–1.3)
CULTURE RESULTS: SIGNIFICANT CHANGE UP
CULTURE RESULTS: SIGNIFICANT CHANGE UP
GLUCOSE SERPL-MCNC: 103 MG/DL — HIGH (ref 70–99)
GLUCOSE SERPL-MCNC: 127 MG/DL — HIGH (ref 70–99)
GLUCOSE SERPL-MCNC: 142 MG/DL — HIGH (ref 70–99)
GLUCOSE SERPL-MCNC: 177 MG/DL — HIGH (ref 70–99)
HCT VFR BLD CALC: 42.4 % — SIGNIFICANT CHANGE UP (ref 34.5–45)
HGB BLD-MCNC: 14.1 G/DL — SIGNIFICANT CHANGE UP (ref 11.5–15.5)
MCHC RBC-ENTMCNC: 28.5 PG — SIGNIFICANT CHANGE UP (ref 27–34)
MCHC RBC-ENTMCNC: 33.3 GM/DL — SIGNIFICANT CHANGE UP (ref 32–36)
MCV RBC AUTO: 85.7 FL — SIGNIFICANT CHANGE UP (ref 80–100)
OSMOLALITY UR: 265 MOS/KG — LOW (ref 300–900)
PA ADP PRP-ACNC: 173 PRU — LOW (ref 194–417)
PLATELET # BLD AUTO: 237 K/UL — SIGNIFICANT CHANGE UP (ref 150–400)
POTASSIUM SERPL-MCNC: 4.1 MMOL/L — SIGNIFICANT CHANGE UP (ref 3.5–5.3)
POTASSIUM SERPL-MCNC: 4.3 MMOL/L — SIGNIFICANT CHANGE UP (ref 3.5–5.3)
POTASSIUM SERPL-MCNC: 4.4 MMOL/L — SIGNIFICANT CHANGE UP (ref 3.5–5.3)
POTASSIUM SERPL-MCNC: 4.5 MMOL/L — SIGNIFICANT CHANGE UP (ref 3.5–5.3)
POTASSIUM SERPL-SCNC: 4.1 MMOL/L — SIGNIFICANT CHANGE UP (ref 3.5–5.3)
POTASSIUM SERPL-SCNC: 4.3 MMOL/L — SIGNIFICANT CHANGE UP (ref 3.5–5.3)
POTASSIUM SERPL-SCNC: 4.4 MMOL/L — SIGNIFICANT CHANGE UP (ref 3.5–5.3)
POTASSIUM SERPL-SCNC: 4.5 MMOL/L — SIGNIFICANT CHANGE UP (ref 3.5–5.3)
PROT SERPL-MCNC: 7.4 G/DL — SIGNIFICANT CHANGE UP (ref 6–8.3)
RBC # BLD: 4.95 M/UL — SIGNIFICANT CHANGE UP (ref 3.8–5.2)
RBC # FLD: 14.7 % — HIGH (ref 10.3–14.5)
SODIUM SERPL-SCNC: 123 MMOL/L — LOW (ref 135–145)
SODIUM SERPL-SCNC: 124 MMOL/L — LOW (ref 135–145)
SODIUM SERPL-SCNC: 124 MMOL/L — LOW (ref 135–145)
SODIUM SERPL-SCNC: 127 MMOL/L — LOW (ref 135–145)
SODIUM UR-SCNC: 28 MMOL/L — SIGNIFICANT CHANGE UP
SPECIMEN SOURCE: SIGNIFICANT CHANGE UP
SPECIMEN SOURCE: SIGNIFICANT CHANGE UP
TROPONIN T, HIGH SENSITIVITY RESULT: 1945 NG/L — HIGH (ref 0–51)
WBC # BLD: 12.48 K/UL — HIGH (ref 3.8–10.5)
WBC # FLD AUTO: 12.48 K/UL — HIGH (ref 3.8–10.5)

## 2019-09-28 PROCEDURE — 99233 SBSQ HOSP IP/OBS HIGH 50: CPT | Mod: GC

## 2019-09-28 RX ORDER — SODIUM CHLORIDE 5 G/100ML
500 INJECTION, SOLUTION INTRAVENOUS
Refills: 0 | Status: DISCONTINUED | OUTPATIENT
Start: 2019-09-28 | End: 2019-09-28

## 2019-09-28 RX ORDER — HYDRALAZINE HCL 50 MG
25 TABLET ORAL THREE TIMES A DAY
Refills: 0 | Status: DISCONTINUED | OUTPATIENT
Start: 2019-09-28 | End: 2019-10-01

## 2019-09-28 RX ORDER — SODIUM CHLORIDE 5 G/100ML
500 INJECTION, SOLUTION INTRAVENOUS
Refills: 0 | Status: DISCONTINUED | OUTPATIENT
Start: 2019-09-28 | End: 2019-09-29

## 2019-09-28 RX ADMIN — ATORVASTATIN CALCIUM 80 MILLIGRAM(S): 80 TABLET, FILM COATED ORAL at 21:51

## 2019-09-28 RX ADMIN — HEPARIN SODIUM 5000 UNIT(S): 5000 INJECTION INTRAVENOUS; SUBCUTANEOUS at 05:21

## 2019-09-28 RX ADMIN — Medication 37.5 MILLIGRAM(S): at 05:20

## 2019-09-28 RX ADMIN — Medication 81 MILLIGRAM(S): at 13:02

## 2019-09-28 RX ADMIN — SODIUM CHLORIDE 50 MILLILITER(S): 5 INJECTION, SOLUTION INTRAVENOUS at 21:48

## 2019-09-28 RX ADMIN — Medication 50 MILLIGRAM(S): at 05:20

## 2019-09-28 RX ADMIN — Medication 25 MILLIGRAM(S): at 21:51

## 2019-09-28 RX ADMIN — Medication 37.5 MILLIGRAM(S): at 13:02

## 2019-09-28 NOTE — PROGRESS NOTE ADULT - PROBLEM SELECTOR PLAN 4
Unclear baseline, suspect combination of hemodynamic WINSTON as well as possible MARLENA in setting of some likely underlying CKD.   - Continue to trend  -Renal US showed no hydronephrosis and only medical renal disease.   - Appreciate renal - likely due to contrast.  -Lasix on hold for now.  - Definitive treatment of three vessel disease pending resolution of kidney injury

## 2019-09-28 NOTE — CHART NOTE - NSCHARTNOTEFT_GEN_A_CORE
Per discussion with nephrology team, will decrease hydralazine 37.5 mg to 25 mg TID in order to improve possible pre-renal component of WINSTON on top of contrast induced nephropathy. We will also repeat a urinalysis. Although afterload reduction from hydralazine 37.5 mg TID, WINSTON is the    Per discussion with [facility] NPs in touch with CT surgery, CT surgery not presently offering CABG. Per discussion with Dr. Wick, PCI is very high risk for minimal benefit and so would defer to heart failure to assess efficacy of medical management before pursuing PCI.

## 2019-09-28 NOTE — PROGRESS NOTE ADULT - PROBLEM SELECTOR PLAN 3
Afebrile, fluctuating, suspect leukocytosis is reactive  - Blood cultures x2 - NGTD.   - UA negative.   - CXR without consolidations.   - Monitor off antibiotics for now - diuretic held in view of WINSTON, worsening hyponatremia  - Daily standing weights and strict I's/O's.  -Hydralazine uptitrating, per CHF for afterload reducing agent.

## 2019-09-28 NOTE — PROGRESS NOTE ADULT - PROBLEM SELECTOR PLAN 1
Patient with WINSTON on CKD in the setting of MARLENA from recent cardiac catheterization. Patient with CKD likely due to uncontrolled HTN as patient had creatinine ~1.8 in 1/2018. Patient has bland urine sediment and U/S of the kidneys showing multiple renal cysts. Will continue to follow for now however for acute component will continue to monitor and recommend no additional contrast for at least 48-72 hrs. Otherwise avoid other nephrotoxic agents, avoid hypotensive episodes and renally dose all medications.

## 2019-09-28 NOTE — PROGRESS NOTE ADULT - ATTENDING COMMENTS
I have seen this patient with the fellow and agree with their assessment and plan. In addition,    # WINSTON - suspect contrast induced nephropathy. Patient was relatively hypotensive yesterday (SBP ), she may have developed hemodynamic ATN. Repeat UA.     # hypotonic, hyponatremia - Urine osm <300. Etiology of hyponatremia is multifactorial. She likely has low water clearance from WINSTON, and she may have had a low solute diet. 1.5% hypertonic saline at 50ml/hour. Not to correct more than 8meq in 24 hours. Goal serum sodium - not higher than 130 by 6am tomorrow.     # Hypertension - Several hypotensive episode overnight. To keep SBP >110 if possible. We recommend reducing the dose of hydralazine to 25mg po TID.     # CKD - baseline serum creatinine 1.8. Etiology of CKD unclear. UA bland. Probably secondary to microvascular disease/ hypertension.     # Medication toxicity Monitoring: medication dose reviewed. Please dose medications to eGFR<15    The rest of the recommendations as per fellow's note.    Brielle Umanzor MD  Attending Nephrologist  [phone]

## 2019-09-28 NOTE — PROGRESS NOTE ADULT - SUBJECTIVE AND OBJECTIVE BOX
INTERVAL EVENTS: YI    SUBJECTIVE:  No chest pain or shortness of breath  Complains of blood draws    ROS:  General - No fevers or chills  Cards - No chest pain or palpitations  Pulm - No cough or shortness of breath  GI - No abdominal pain. Last BM was this am   - No dysuria    OBJECTIVE:  Vitals Last 24 hrs  Vital Signs Last 24 Hrs  T(C): 36.3 (28 Sep 2019 04:18), Max: 36.8 (27 Sep 2019 21:39)  T(F): 97.4 (28 Sep 2019 04:18), Max: 98.3 (27 Sep 2019 21:39)  HR: 93 (28 Sep 2019 04:18) (81 - 93)  BP: 130/81 (28 Sep 2019 04:18) (101/56 - 130/81)  BP(mean): --  RR: 17 (28 Sep 2019 04:18) (16 - 17)  SpO2: 96% (28 Sep 2019 04:18) (96% - 98%)    IOs  I&O's Summary    27 Sep 2019 07:01  -  28 Sep 2019 07:00  --------------------------------------------------------  IN: 480 mL / OUT: 200 mL / NET: 280 mL        PE:  General - Lying in bed, NAD. sleeping. awakes easily  Heart - Normal S1 and S2. No murmurs auscultated.  Lungs - Clear to auscultation bilaterally anteriorly  GI - Abdomen soft, NT, ND.  Extremities - No lower extremity edema. Peripheral pulses intact. Calves nontender.  Skin - No rashes on extremities    LABS:          IMAGING: No new imaging INTERVAL EVENTS: YI    SUBJECTIVE:  No chest pain or shortness of breath  Complains of blood draws    ROS:  General - No fevers or chills  Cards - No chest pain or palpitations  Pulm - No cough or shortness of breath  GI - No abdominal pain. Last BM was this am   - No dysuria    OBJECTIVE:  Vitals Last 24 hrs  Vital Signs Last 24 Hrs  T(C): 36.3 (28 Sep 2019 04:18), Max: 36.8 (27 Sep 2019 21:39)  T(F): 97.4 (28 Sep 2019 04:18), Max: 98.3 (27 Sep 2019 21:39)  HR: 93 (28 Sep 2019 04:18) (81 - 93)  BP: 130/81 (28 Sep 2019 04:18) (101/56 - 130/81)  BP(mean): --  RR: 17 (28 Sep 2019 04:18) (16 - 17)  SpO2: 96% (28 Sep 2019 04:18) (96% - 98%)    IOs  I&O's Summary    27 Sep 2019 07:01  -  28 Sep 2019 07:00  --------------------------------------------------------  IN: 480 mL / OUT: 200 mL / NET: 280 mL        PE:  General - Lying in bed, NAD. sleeping. awakes easily  Heart - Normal S1 and S2. No murmurs auscultated.  Lungs - Clear to auscultation bilaterally anteriorly  GI - Abdomen soft, NT, ND.  Extremities - No lower extremity edema. Peripheral pulses intact. Calves nontender.  Skin - No rashes on extremities    LABS:  Pending labs    IMAGING: No new imaging INTERVAL EVENTS: YI    SUBJECTIVE:  No chest pain or shortness of breath  Complains of blood draws    ROS:  General - No fevers or chills  Cards - No chest pain or palpitations  Pulm - No cough or shortness of breath  GI - No abdominal pain. Last BM was this am   - No dysuria    OBJECTIVE:  Vitals Last 24 hrs  Vital Signs Last 24 Hrs  T(C): 36.3 (28 Sep 2019 04:18), Max: 36.8 (27 Sep 2019 21:39)  T(F): 97.4 (28 Sep 2019 04:18), Max: 98.3 (27 Sep 2019 21:39)  HR: 93 (28 Sep 2019 04:18) (81 - 93)  BP: 130/81 (28 Sep 2019 04:18) (101/56 - 130/81)  BP(mean): --  RR: 17 (28 Sep 2019 04:18) (16 - 17)  SpO2: 96% (28 Sep 2019 04:18) (96% - 98%)    IOs  I&O's Summary    27 Sep 2019 07:01  -  28 Sep 2019 07:00  --------------------------------------------------------  IN: 480 mL / OUT: 200 mL / NET: 280 mL        PE:  General - Lying in bed, NAD. sleeping. awakes easily  Heart - Normal S1 and S2. No murmurs auscultated.  Lungs - Clear to auscultation bilaterally anteriorly  GI - Abdomen soft, NT, ND.  Extremities - No lower extremity edema. Peripheral pulses intact. Calves nontender.  Skin - No rashes on extremities    LABS:                        14.1   12.48 )-----------( 237      ( 28 Sep 2019 11:17 )             42.4       IMAGING: No new imaging  09-28    124<L>  |  88<L>  |  49<H>  ----------------------------<  177<H>  4.3   |  23  |  2.58<H>    Ca    9.0      28 Sep 2019 10:58  Phos  4.6     09-27  Mg     2.3     09-27    TPro  7.4  /  Alb  3.9  /  TBili  0.6  /  DBili  x   /  AST  33  /  ALT  31  /  AlkPhos  93  09-28 INTERVAL EVENTS: YI    SUBJECTIVE:  No chest pain or shortness of breath  Complains of blood draws    ROS:  General - No fevers or chills  Cards - No chest pain or palpitations  Pulm - No cough or shortness of breath  GI - No abdominal pain. Last BM was this am   - No dysuria    OBJECTIVE:  Vitals Last 24 hrs  Vital Signs Last 24 Hrs  T(C): 36.3 (28 Sep 2019 04:18), Max: 36.8 (27 Sep 2019 21:39)  T(F): 97.4 (28 Sep 2019 04:18), Max: 98.3 (27 Sep 2019 21:39)  HR: 93 (28 Sep 2019 04:18) (81 - 93)  BP: 130/81 (28 Sep 2019 04:18) (101/56 - 130/81)  BP(mean): --  RR: 17 (28 Sep 2019 04:18) (16 - 17)  SpO2: 96% (28 Sep 2019 04:18) (96% - 98%)    IOs  I&O's Summary    27 Sep 2019 07:01  -  28 Sep 2019 07:00  --------------------------------------------------------  IN: 480 mL / OUT: 200 mL / NET: 280 mL        PE:  General - Lying in bed, NAD. sleeping. awakes easily  Heart - Normal S1 and S2. No murmurs auscultated.  Lungs - Clear to auscultation bilaterally anteriorly  GI - Abdomen soft, NT, ND.  Extremities - No lower extremity edema. Peripheral pulses intact. Calves nontender.  Skin - No rashes on extremities    LABS:                        14.1   12.48 )-----------( 237      ( 28 Sep 2019 11:17 )             42.4       09-28    124<L>  |  88<L>  |  49<H>  ----------------------------<  177<H>  4.3   |  23  |  2.58<H>    Ca    9.0      28 Sep 2019 10:58  Phos  4.6     09-27  Mg     2.3     09-27    TPro  7.4  /  Alb  3.9  /  TBili  0.6  /  DBili  x   /  AST  33  /  ALT  31  /  AlkPhos  93  09-28      IMAGING: No new imaging

## 2019-09-28 NOTE — PROGRESS NOTE ADULT - PROBLEM SELECTOR PLAN 2
Patient with euvolemic/hypervolemic hyponatremia. Would re-send urine osm and urine Na. Free water restriction for now to 1L and continue to monitor. As sodium continues to drop would start 1.5% NaCl at 50 cc/hr with goal correction to 130 by 9/29 AM. Check sodium q4-q6h and please call nephrology fellow with results.

## 2019-09-28 NOTE — PROGRESS NOTE ADULT - PROBLEM SELECTOR PLAN 1
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 Seymour) and cardiology (Dr. Wick) consulted.  - Cardiac viability shows moderate residual viability. F/uing up with Dr. Wick and heart failure team about next steps for intervention. Discussed with family; they are still discussing.  - Will proceed with pre-op workup including P2Y12 for plavix activity and medical optimization per cardiology  - Heparin drip d/c'ed per HF  - ASA 81mg q Day  - Atorvastatin 80 mg q Day  - Metoprolol tartrate 25 mg po BID; converted to metoprolol ER 50mg daily.   - Holding Plavix i/s/o possible CABG. Continue to trend P2Y12 levels  -Abnormal TFT's possibly due to euthyroid sick syndrome. Will need follow up.   -Trend cardiac enzymes  -C/w telemetry.  - Hydralazine titrated to 37.5mg TID per HF recommendations

## 2019-09-28 NOTE — PROGRESS NOTE ADULT - PROBLEM SELECTOR PLAN 5
Per chart review, patient on amlodipine 5 mg q Day at home  - Hold amlodipine.  - Hydralazine and metoprolol for now. Afebrile, fluctuating, suspect leukocytosis is reactive  - Blood cultures x2 - NGTD.   - UA negative.   - CXR without consolidations.   - Monitor off antibiotics for now

## 2019-09-28 NOTE — PROGRESS NOTE ADULT - SUBJECTIVE AND OBJECTIVE BOX
[facility] Division of Kidney Diseases & Hypertension  FOLLOW UP NOTE  [phone]--------------------------------------------------------------------------------  Chief Complaint:Non-ST elevation myocardial infarction (NSTEMI)      24 hour events/subjective: No acute events overnight. Patient resting comfortably in bed when seen this AM. Labs noted to have persistently elevated creatinine to 2.74, and sodium noted to be dropping to 123. Other labs grossly stable. Vital signs stable as well.        PAST HISTORY  --------------------------------------------------------------------------------  No significant changes to PMH, PSH, FHx, SHx, unless otherwise noted    ALLERGIES & MEDICATIONS  --------------------------------------------------------------------------------  Allergies    No Known Allergies    Intolerances      Standing Inpatient Medications  aspirin enteric coated 81 milliGRAM(s) Oral daily  atorvastatin 80 milliGRAM(s) Oral at bedtime  heparin  Injectable 5000 Unit(s) SubCutaneous two times a day  hydrALAZINE 37.5 milliGRAM(s) Oral three times a day  metoprolol succinate ER 50 milliGRAM(s) Oral daily    PRN Inpatient Medications  acetaminophen   Tablet .. 650 milliGRAM(s) Oral every 6 hours PRN      REVIEW OF SYSTEMS  --------------------------------------------------------------------------------  Gen: No  fevers/chills  Skin: No rashes  Head/Eyes/Ears/Mouth: No headache; Normal hearing; Normal vision w/o blurriness  Respiratory: No dyspnea, cough, wheezing, hemoptysis  CV: No chest pain, PND, orthopnea  GI: No abdominal pain, diarrhea, constipation, nausea, vomiting  : No increased frequency, dysuria, hematuria, nocturia  MSK: No joint pain/swelling; no back pain; no edema  Neuro: No dizziness/lightheadedness, weakness, seizures, numbness, tingling      All other systems were reviewed and are negative, except as noted.    VITALS/PHYSICAL EXAM  --------------------------------------------------------------------------------  T(C): 36.3 (09-28-19 @ 04:18), Max: 36.8 (09-27-19 @ 21:39)  HR: 93 (09-28-19 @ 04:18) (81 - 93)  BP: 130/81 (09-28-19 @ 04:18) (101/56 - 130/81)  RR: 17 (09-28-19 @ 04:18) (16 - 17)  SpO2: 96% (09-28-19 @ 04:18) (96% - 98%)  Wt(kg): --        09-27-19 @ 07:01  -  09-28-19 @ 07:00  --------------------------------------------------------  IN: 480 mL / OUT: 200 mL / NET: 280 mL      Physical Exam:  	Gen: NAD, well-appearing  	HEENT: PERRL, supple neck  	Pulm: CTA B/L  	CV:  S1S2  	Abd: +BS, soft   	MSK: No B/L Lower ext edema  	Neuro: No focal deficits  	Skin: Warm, without rashes  	Vascular: No cyanosis    LABS/STUDIES  --------------------------------------------------------------------------------              14.5   12.9  >-----------<  245      [09-27-19 @ 08:49]              44.7     123  |  85  |  51  ----------------------------<  127      [09-28-19 @ 06:46]  4.1   |  24  |  2.74        Ca     9.3     [09-28-19 @ 06:46]      Mg     2.3     [09-27-19 @ 08:49]      Phos  4.6     [09-27-19 @ 08:49]    TPro  7.4  /  Alb  3.9  /  TBili  0.6  /  DBili  x   /  AST  33  /  ALT  31  /  AlkPhos  93  [09-28-19 @ 06:46]      PTT: 66.8       [09-27-19 @ 08:49]      Creatinine Trend:  SCr 2.74 [09-28 @ 06:46]  SCr 2.52 [09-27 @ 08:49]  SCr 2.77 [09-27 @ 01:04]  SCr 2.49 [09-26 @ 10:01]  SCr 2.64 [09-26 @ 06:24]    Urinalysis - [09-24-19 @ 20:59]      Color Colorless / Appearance Clear / SG 1.010 / pH 6.0      Gluc Negative / Ketone Negative  / Bili Negative / Urobili Negative       Blood Trace / Protein Trace / Leuk Est Negative / Nitrite Negative      RBC 3 / WBC 4 / Hyaline 1 / Gran  / Sq Epi  / Non Sq Epi 1 / Bacteria Negative    Urine Creatinine 16      [09-25-19 @ 01:54]  Urine Sodium 96      [09-25-19 @ 01:54]  Urine Urea Nitrogen 175      [09-25-19 @ 07:54]  Urine Chloride 84      [09-25-19 @ 01:54]  Urine Osmolality 282      [09-25-19 @ 01:57]    HbA1c 6.1      [09-25-19 @ 09:57]  TSH 0.19      [09-27-19 @ 13:35]  Lipid: chol 214, , HDL 78,       [09-25-19 @ 08:44]

## 2019-09-28 NOTE — PROGRESS NOTE ADULT - PROBLEM SELECTOR PLAN 6
Euvolemic hyponatremia. Possibly due to recent contrast.   -Send urine and serum osms and urine Na.   -Renal recs appreciated. Fluid restrict to 1L per day.   -Monitor BMP.  -Will liberalize diet. Euvolemic hyponatremia. Possibly due to recent contrast.   -Send urine and serum osms and urine Na.   -Renal recs appreciated. hypertonic saline at 50 ml/hr and q4h BMPs. Will call nephrology with results. Per chart review, patient on amlodipine 5 mg q Day at home  - Hold amlodipine.  - Hydralazine and metoprolol for now.

## 2019-09-28 NOTE — PROGRESS NOTE ADULT - PROBLEM SELECTOR PLAN 7
Diet: Mechanical soft low fat diet with Glucerna Halal fluid restricted. -Nutrition eval appreciated.   DVT Prophylaxis: Heparin SQ for now.   Dispo: PT recs home with assist.

## 2019-09-28 NOTE — PROGRESS NOTE ADULT - ASSESSMENT
82 y/o [place] female (currently resides in [place] in the [place] visiting family, Fadi speaking) with PMHx of HTN who presented to the ED @ [facility] on 9/22 for ongoing dyspnea noted to have acute on chronic systolic heart failure and NSTEMI, transferred here for LHC/RHC evaluation, found to have triple vessel disease.

## 2019-09-28 NOTE — PROGRESS NOTE ADULT - PROBLEM SELECTOR PLAN 2
- Lasix 40 mg po D - held today in view of WINSTON.   - Daily standing weights and strict I's/O's.  -Hydralazine uptitrating, per CHF for afterload reducing agent. Euvolemic hyponatremia. Possibly due to recent contrast.   -Send urine and serum osms and urine Na.   -Renal recs appreciated. hypertonic saline at 50 ml/hr and q4h BMPs. Will call nephrology with results.

## 2019-09-29 LAB
ALBUMIN SERPL ELPH-MCNC: 3.5 G/DL — SIGNIFICANT CHANGE UP (ref 3.3–5)
ALP SERPL-CCNC: 83 U/L — SIGNIFICANT CHANGE UP (ref 40–120)
ALT FLD-CCNC: 36 U/L — SIGNIFICANT CHANGE UP (ref 10–45)
ANION GAP SERPL CALC-SCNC: 12 MMOL/L — SIGNIFICANT CHANGE UP (ref 5–17)
ANION GAP SERPL CALC-SCNC: 12 MMOL/L — SIGNIFICANT CHANGE UP (ref 5–17)
ANION GAP SERPL CALC-SCNC: 13 MMOL/L — SIGNIFICANT CHANGE UP (ref 5–17)
APPEARANCE UR: CLEAR — SIGNIFICANT CHANGE UP
AST SERPL-CCNC: 34 U/L — SIGNIFICANT CHANGE UP (ref 10–40)
BILIRUB SERPL-MCNC: 0.7 MG/DL — SIGNIFICANT CHANGE UP (ref 0.2–1.2)
BILIRUB UR-MCNC: NEGATIVE — SIGNIFICANT CHANGE UP
BUN SERPL-MCNC: 43 MG/DL — HIGH (ref 7–23)
BUN SERPL-MCNC: 44 MG/DL — HIGH (ref 7–23)
BUN SERPL-MCNC: 45 MG/DL — HIGH (ref 7–23)
CALCIUM SERPL-MCNC: 8.8 MG/DL — SIGNIFICANT CHANGE UP (ref 8.4–10.5)
CALCIUM SERPL-MCNC: 8.9 MG/DL — SIGNIFICANT CHANGE UP (ref 8.4–10.5)
CALCIUM SERPL-MCNC: 9.1 MG/DL — SIGNIFICANT CHANGE UP (ref 8.4–10.5)
CHLORIDE SERPL-SCNC: 91 MMOL/L — LOW (ref 96–108)
CHLORIDE SERPL-SCNC: 97 MMOL/L — SIGNIFICANT CHANGE UP (ref 96–108)
CHLORIDE SERPL-SCNC: 98 MMOL/L — SIGNIFICANT CHANGE UP (ref 96–108)
CO2 SERPL-SCNC: 20 MMOL/L — LOW (ref 22–31)
CO2 SERPL-SCNC: 22 MMOL/L — SIGNIFICANT CHANGE UP (ref 22–31)
CO2 SERPL-SCNC: 22 MMOL/L — SIGNIFICANT CHANGE UP (ref 22–31)
COLOR SPEC: SIGNIFICANT CHANGE UP
CREAT SERPL-MCNC: 2.28 MG/DL — HIGH (ref 0.5–1.3)
CREAT SERPL-MCNC: 2.34 MG/DL — HIGH (ref 0.5–1.3)
CREAT SERPL-MCNC: 2.58 MG/DL — HIGH (ref 0.5–1.3)
DIFF PNL FLD: NEGATIVE — SIGNIFICANT CHANGE UP
GLUCOSE SERPL-MCNC: 104 MG/DL — HIGH (ref 70–99)
GLUCOSE SERPL-MCNC: 105 MG/DL — HIGH (ref 70–99)
GLUCOSE SERPL-MCNC: 125 MG/DL — HIGH (ref 70–99)
GLUCOSE UR QL: SIGNIFICANT CHANGE UP
HCT VFR BLD CALC: 41 % — SIGNIFICANT CHANGE UP (ref 34.5–45)
HGB BLD-MCNC: 12.7 G/DL — SIGNIFICANT CHANGE UP (ref 11.5–15.5)
KETONES UR-MCNC: NEGATIVE — SIGNIFICANT CHANGE UP
LEUKOCYTE ESTERASE UR-ACNC: NEGATIVE — SIGNIFICANT CHANGE UP
MAGNESIUM SERPL-MCNC: 2.8 MG/DL — HIGH (ref 1.6–2.6)
MCHC RBC-ENTMCNC: 27.1 PG — SIGNIFICANT CHANGE UP (ref 27–34)
MCHC RBC-ENTMCNC: 31 GM/DL — LOW (ref 32–36)
MCV RBC AUTO: 87.6 FL — SIGNIFICANT CHANGE UP (ref 80–100)
NITRITE UR-MCNC: NEGATIVE — SIGNIFICANT CHANGE UP
OSMOLALITY UR: 218 MOSM/KG — SIGNIFICANT CHANGE UP (ref 50–1200)
PH UR: 7 — SIGNIFICANT CHANGE UP (ref 5–8)
PHOSPHATE SERPL-MCNC: 3.6 MG/DL — SIGNIFICANT CHANGE UP (ref 2.5–4.5)
PLATELET # BLD AUTO: 245 K/UL — SIGNIFICANT CHANGE UP (ref 150–400)
POTASSIUM SERPL-MCNC: 4.5 MMOL/L — SIGNIFICANT CHANGE UP (ref 3.5–5.3)
POTASSIUM SERPL-MCNC: 4.6 MMOL/L — SIGNIFICANT CHANGE UP (ref 3.5–5.3)
POTASSIUM SERPL-MCNC: 5.2 MMOL/L — SIGNIFICANT CHANGE UP (ref 3.5–5.3)
POTASSIUM SERPL-SCNC: 4.5 MMOL/L — SIGNIFICANT CHANGE UP (ref 3.5–5.3)
POTASSIUM SERPL-SCNC: 4.6 MMOL/L — SIGNIFICANT CHANGE UP (ref 3.5–5.3)
POTASSIUM SERPL-SCNC: 5.2 MMOL/L — SIGNIFICANT CHANGE UP (ref 3.5–5.3)
PROT SERPL-MCNC: 6.8 G/DL — SIGNIFICANT CHANGE UP (ref 6–8.3)
PROT UR-MCNC: SIGNIFICANT CHANGE UP
RBC # BLD: 4.68 M/UL — SIGNIFICANT CHANGE UP (ref 3.8–5.2)
RBC # FLD: 14.9 % — HIGH (ref 10.3–14.5)
SODIUM SERPL-SCNC: 126 MMOL/L — LOW (ref 135–145)
SODIUM SERPL-SCNC: 129 MMOL/L — LOW (ref 135–145)
SODIUM SERPL-SCNC: 132 MMOL/L — LOW (ref 135–145)
SODIUM UR-SCNC: 33 MMOL/L — SIGNIFICANT CHANGE UP
SP GR SPEC: 1.01 — LOW (ref 1.01–1.02)
UROBILINOGEN FLD QL: SIGNIFICANT CHANGE UP
WBC # BLD: 11.4 K/UL — HIGH (ref 3.8–10.5)
WBC # FLD AUTO: 11.4 K/UL — HIGH (ref 3.8–10.5)

## 2019-09-29 PROCEDURE — 99233 SBSQ HOSP IP/OBS HIGH 50: CPT | Mod: GC

## 2019-09-29 PROCEDURE — 71045 X-RAY EXAM CHEST 1 VIEW: CPT | Mod: 26

## 2019-09-29 RX ORDER — SENNA PLUS 8.6 MG/1
2 TABLET ORAL AT BEDTIME
Refills: 0 | Status: DISCONTINUED | OUTPATIENT
Start: 2019-09-29 | End: 2019-10-04

## 2019-09-29 RX ORDER — FUROSEMIDE 40 MG
40 TABLET ORAL DAILY
Refills: 0 | Status: DISCONTINUED | OUTPATIENT
Start: 2019-09-29 | End: 2019-09-30

## 2019-09-29 RX ADMIN — HEPARIN SODIUM 5000 UNIT(S): 5000 INJECTION INTRAVENOUS; SUBCUTANEOUS at 17:52

## 2019-09-29 RX ADMIN — Medication 25 MILLIGRAM(S): at 14:40

## 2019-09-29 RX ADMIN — Medication 25 MILLIGRAM(S): at 21:09

## 2019-09-29 RX ADMIN — Medication 25 MILLIGRAM(S): at 05:11

## 2019-09-29 RX ADMIN — Medication 40 MILLIGRAM(S): at 17:52

## 2019-09-29 RX ADMIN — HEPARIN SODIUM 5000 UNIT(S): 5000 INJECTION INTRAVENOUS; SUBCUTANEOUS at 05:11

## 2019-09-29 RX ADMIN — SENNA PLUS 2 TABLET(S): 8.6 TABLET ORAL at 21:09

## 2019-09-29 RX ADMIN — ATORVASTATIN CALCIUM 80 MILLIGRAM(S): 80 TABLET, FILM COATED ORAL at 21:09

## 2019-09-29 RX ADMIN — Medication 81 MILLIGRAM(S): at 14:40

## 2019-09-29 RX ADMIN — Medication 50 MILLIGRAM(S): at 05:11

## 2019-09-29 NOTE — PROGRESS NOTE ADULT - SUBJECTIVE AND OBJECTIVE BOX
[facility] Division of Kidney Diseases & Hypertension  FOLLOW UP NOTE  [phone]--------------------------------------------------------------------------------  Chief Complaint:Non-ST elevation myocardial infarction (NSTEMI)      24 hour events/subjective: Overnight patients sodium went to 132 and 1.5% NaCl was discontinued. Patient noted to be on O2        PAST HISTORY  --------------------------------------------------------------------------------  No significant changes to PMH, PSH, FHx, SHx, unless otherwise noted    ALLERGIES & MEDICATIONS  --------------------------------------------------------------------------------  Allergies    No Known Allergies    Intolerances      Standing Inpatient Medications  aspirin enteric coated 81 milliGRAM(s) Oral daily  atorvastatin 80 milliGRAM(s) Oral at bedtime  furosemide    Tablet 40 milliGRAM(s) Oral daily  heparin  Injectable 5000 Unit(s) SubCutaneous two times a day  hydrALAZINE 25 milliGRAM(s) Oral three times a day  metoprolol succinate ER 50 milliGRAM(s) Oral daily    PRN Inpatient Medications  acetaminophen   Tablet .. 650 milliGRAM(s) Oral every 6 hours PRN      REVIEW OF SYSTEMS  --------------------------------------------------------------------------------  Gen: No  fevers/chills  Skin: No rashes  Head/Eyes/Ears/Mouth: No headache; Normal hearing; Normal vision w/o blurriness  Respiratory: No dyspnea, cough, wheezing, hemoptysis  CV: No chest pain, PND, orthopnea  GI: No abdominal pain, diarrhea, constipation, nausea, vomiting  : No increased frequency, dysuria, hematuria, nocturia  MSK: No joint pain/swelling; no back pain; no edema  Neuro: No dizziness/lightheadedness, weakness, seizures, numbness, tingling      All other systems were reviewed and are negative, except as noted.    VITALS/PHYSICAL EXAM  --------------------------------------------------------------------------------  T(C): 37.1 (09-29-19 @ 10:00), Max: 37.1 (09-29-19 @ 10:00)  HR: 79 (09-29-19 @ 14:41) (77 - 85)  BP: 109/74 (09-29-19 @ 14:41) (95/60 - 118/73)  RR: 18 (09-29-19 @ 10:00) (18 - 18)  SpO2: 99% (09-29-19 @ 10:00) (94% - 99%)  Wt(kg): --        09-28-19 @ 07:01  -  09-29-19 @ 07:00  --------------------------------------------------------  IN: 1100 mL / OUT: 2200 mL / NET: -1100 mL    09-29-19 @ 07:01  -  09-29-19 @ 15:24  --------------------------------------------------------  IN: 250 mL / OUT: 500 mL / NET: -250 mL      Physical Exam:  	Gen: NAD, well-appearing  	HEENT: PERRL, supple neck, clear oropharynx  	Pulm: CTA B/L  	CV: RRR, S1S2;  	Back: No spinal or CVA tenderness  	Abd: +BS, soft, nontender/nondistended  	: No suprapubic tenderness                      Extremities: no bilateral LE edema noted.                       Neuro: No focal deficits, intact gait  	Skin: Warm, without rashes  	Vascular access:    LABS/STUDIES  --------------------------------------------------------------------------------              12.7   11.40 >-----------<  245      [09-29-19 @ 09:35]              41.0     129  |  97  |  45  ----------------------------<  125      [09-29-19 @ 13:50]  4.6   |  20  |  2.34        Ca     8.8     [09-29-19 @ 13:50]      Mg     2.8     [09-29-19 @ 05:27]      Phos  3.6     [09-29-19 @ 05:27]    TPro  6.8  /  Alb  3.5  /  TBili  0.7  /  DBili  x   /  AST  34  /  ALT  36  /  AlkPhos  83  [09-29-19 @ 05:28]          Creatinine Trend:  SCr 2.34 [09-29 @ 13:50]  SCr 2.58 [09-29 @ 05:28]  SCr 2.87 [09-28 @ 22:26]  SCr 3.00 [09-28 @ 17:18]  SCr 2.58 [09-28 @ 10:58]    Urinalysis - [09-29-19 @ 01:33]      Color Light Yellow / Appearance Clear / SG 1.009 / pH 7.0      Gluc Trace / Ketone Negative  / Bili Negative / Urobili <2 mg/dL       Blood Negative / Protein Trace / Leuk Est Negative / Nitrite Negative      RBC  / WBC  / Hyaline  / Gran  / Sq Epi  / Non Sq Epi  / Bacteria     Urine Creatinine 16      [09-25-19 @ 01:54]  Urine Sodium 33      [09-29-19 @ 05:33]  Urine Urea Nitrogen 175      [09-25-19 @ 07:54]  Urine Chloride 84      [09-25-19 @ 01:54]  Urine Osmolality 218      [09-29-19 @ 09:27]    HbA1c 6.1      [09-25-19 @ 09:57]  TSH 0.19      [09-27-19 @ 13:35]  Lipid: chol 214, , HDL 78,       [09-25-19 @ 08:44] [facility] Division of Kidney Diseases & Hypertension  FOLLOW UP NOTE  [phone]--------------------------------------------------------------------------------  Chief Complaint:Non-ST elevation myocardial infarction (NSTEMI)      24 hour events/subjective: Overnight patients sodium went to 132 and 1.5% NaCl was discontinued. Patient noted to be on O2 this AM as well. HTS was stopped and repeat sodium was 129. Otherwise vitals stable. Creatinine down to 2.33        PAST HISTORY  --------------------------------------------------------------------------------  No significant changes to PMH, PSH, FHx, SHx, unless otherwise noted    ALLERGIES & MEDICATIONS  --------------------------------------------------------------------------------  Allergies    No Known Allergies    Intolerances      Standing Inpatient Medications  aspirin enteric coated 81 milliGRAM(s) Oral daily  atorvastatin 80 milliGRAM(s) Oral at bedtime  furosemide    Tablet 40 milliGRAM(s) Oral daily  heparin  Injectable 5000 Unit(s) SubCutaneous two times a day  hydrALAZINE 25 milliGRAM(s) Oral three times a day  metoprolol succinate ER 50 milliGRAM(s) Oral daily    PRN Inpatient Medications  acetaminophen   Tablet .. 650 milliGRAM(s) Oral every 6 hours PRN      REVIEW OF SYSTEMS  --------------------------------------------------------------------------------  Gen: No  fevers/chills  Skin: No rashes  Head/Eyes/Ears/Mouth: No headache; Normal hearing; Normal vision w/o blurriness  Respiratory: No dyspnea, cough, wheezing, hemoptysis  CV: No chest pain, PND, orthopnea  GI: No abdominal pain, diarrhea, constipation, nausea, vomiting  : No increased frequency, dysuria, hematuria, nocturia  MSK: No joint pain/swelling; no back pain; no edema  Neuro: No dizziness/lightheadedness, weakness, seizures, numbness, tingling      All other systems were reviewed and are negative, except as noted.    VITALS/PHYSICAL EXAM  --------------------------------------------------------------------------------  T(C): 37.1 (09-29-19 @ 10:00), Max: 37.1 (09-29-19 @ 10:00)  HR: 79 (09-29-19 @ 14:41) (77 - 85)  BP: 109/74 (09-29-19 @ 14:41) (95/60 - 118/73)  RR: 18 (09-29-19 @ 10:00) (18 - 18)  SpO2: 99% (09-29-19 @ 10:00) (94% - 99%)  Wt(kg): --        09-28-19 @ 07:01  -  09-29-19 @ 07:00  --------------------------------------------------------  IN: 1100 mL / OUT: 2200 mL / NET: -1100 mL    09-29-19 @ 07:01  -  09-29-19 @ 15:24  --------------------------------------------------------  IN: 250 mL / OUT: 500 mL / NET: -250 mL      Physical Exam:  	Gen: NAD, well-appearing  	HEENT: PERRL, supple neck  	Pulm: Crackles at bases. On O2  	CV:  S1S2  	Abd: +BS, soft   	MSK: No B/L Lower ext edema  	Neuro: No focal deficits  	Skin: Warm, without rashes  	Vascular: No cyanosis    LABS/STUDIES  --------------------------------------------------------------------------------              12.7   11.40 >-----------<  245      [09-29-19 @ 09:35]              41.0     129  |  97  |  45  ----------------------------<  125      [09-29-19 @ 13:50]  4.6   |  20  |  2.34        Ca     8.8     [09-29-19 @ 13:50]      Mg     2.8     [09-29-19 @ 05:27]      Phos  3.6     [09-29-19 @ 05:27]    TPro  6.8  /  Alb  3.5  /  TBili  0.7  /  DBili  x   /  AST  34  /  ALT  36  /  AlkPhos  83  [09-29-19 @ 05:28]          Creatinine Trend:  SCr 2.34 [09-29 @ 13:50]  SCr 2.58 [09-29 @ 05:28]  SCr 2.87 [09-28 @ 22:26]  SCr 3.00 [09-28 @ 17:18]  SCr 2.58 [09-28 @ 10:58]    Urinalysis - [09-29-19 @ 01:33]      Color Light Yellow / Appearance Clear / SG 1.009 / pH 7.0      Gluc Trace / Ketone Negative  / Bili Negative / Urobili <2 mg/dL       Blood Negative / Protein Trace / Leuk Est Negative / Nitrite Negative      RBC  / WBC  / Hyaline  / Gran  / Sq Epi  / Non Sq Epi  / Bacteria     Urine Creatinine 16      [09-25-19 @ 01:54]  Urine Sodium 33      [09-29-19 @ 05:33]  Urine Urea Nitrogen 175      [09-25-19 @ 07:54]  Urine Chloride 84      [09-25-19 @ 01:54]  Urine Osmolality 218      [09-29-19 @ 09:27]    HbA1c 6.1      [09-25-19 @ 09:57]  TSH 0.19      [09-27-19 @ 13:35]  Lipid: chol 214, , HDL 78,       [09-25-19 @ 08:44]

## 2019-09-29 NOTE — PROGRESS NOTE ADULT - ATTENDING COMMENTS
I have seen this patient with the fellow and agree with their assessment and plan. In addition,    # WINSTON - multifactorial. Contrast induced nephropathy + hemodynamic ATN + volume depletion. She is no longer volume depleted. No further IV fluid required. Serum creatinine improving to 2.34 today.     # hypotonic, hyponatremia - Urine osm <300. Etiology of hyponatremia is multifactorial. She likely has low water clearance from WINSTON, and she may have had a low solute diet. Serum sodium drop to 129 today. We would not give further hypertonic saline today since she is more short of breath. Please check a CXR. We can consider UreNa to help excrete electrolyte-free water form the urine.     # Hypertension - Patient was hypotensive previously. Now BP improved slightly with a reduced dose of hydralazine.     # CKD - baseline serum creatinine 1.8. Etiology of CKD unclear. UA bland. Probably secondary to microvascular disease/ hypertension.     # Medication toxicity Monitoring: medication dose reviewed. Please dose medications to eGFR<15    The rest of the recommendations as per fellow's note.    Brielle Umanzor MD  Attending Nephrologist  [phone]

## 2019-09-29 NOTE — PROGRESS NOTE ADULT - ASSESSMENT
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

## 2019-09-29 NOTE — PROGRESS NOTE ADULT - PROBLEM SELECTOR PLAN 1
Patient with WINSTON on CKD in the setting of MARLENA from recent cardiac catheterization. Patient with CKD likely due to uncontrolled HTN as patient had creatinine ~1.8 in 1/2018. Patient has bland urine sediment and U/S of the kidneys showing multiple renal cysts. Will continue to follow for now however for acute component will continue to monitor and recommend no additional contrast for at least 48-72 hrs. Otherwise avoid other nephrotoxic agents, avoid hypotensive episodes and renally dose all medications. Currently creatinine 2.3 and downtrending.

## 2019-09-29 NOTE — PROGRESS NOTE ADULT - PROBLEM SELECTOR PLAN 2
Euvolemic hyponatremia. Possibly due to recent contrast.   -Renal recs appreciated.  -D/c'ed hypertonic saline  -BMP noon

## 2019-09-29 NOTE — PROGRESS NOTE ADULT - PROBLEM SELECTOR PLAN 2
Patient with euvolemic/hypervolemic hyponatremia. Free water restriction for now to 1L and continue to monitor. Patient's sodium noted to drop to 129 off HTS. Would recommend getting CXR as patient has new O2 requirement and some crackles concerning for fluid overload. Can give 40 mg PO Lasix and see if she has urine output. Please recheck sodium at 10 PM and call nephrology fellow with results.

## 2019-09-29 NOTE — PROGRESS NOTE ADULT - SUBJECTIVE AND OBJECTIVE BOX
INTERVAL EVENTS:  YI    SUBJECTIVE:  Lower back pain  No CP  Noticed shortness of breath starting this morning    ROS:  General - No fevers or chills  Cards - No chest pain or palpitations  Pulm - +SOB, - cough  GI - No abdominal pain, diarrhea, melena, or hematochezia. Last BM 2 days ago   - No dysuria or hematuria    OBJECTIVE:  Vitals Last 24 hrs  Vital Signs Last 24 Hrs  T(C): 36.4 (29 Sep 2019 04:40), Max: 36.8 (28 Sep 2019 13:18)  T(F): 97.6 (29 Sep 2019 04:40), Max: 98.2 (28 Sep 2019 13:18)  HR: 77 (29 Sep 2019 05:08) (68 - 85)  BP: 111/67 (29 Sep 2019 05:08) (95/60 - 169/61)  BP(mean): --  RR: 18 (29 Sep 2019 05:08) (17 - 18)  SpO2: 98% (29 Sep 2019 05:08) (94% - 98%)    IOs  I&O's Summary    28 Sep 2019 07:01  -  29 Sep 2019 07:00  --------------------------------------------------------  IN: 1100 mL / OUT: 2200 mL / NET: -1100 mL      PE:  General - No acute distress  Heart - Normal S1 and S2. +S3. No murmurs auscultated.  Lungs - Clear to auscultation bilaterally  GI - Abdomen soft, NT, ND.  Extremities - No lower extremity edema. Peripheral pulses intact. Calves nontender.  Skin - No rashes on extremities    LABS:  09-29    132<L>  |  98  |  44<H>  ----------------------------<  104<H>  4.5   |  22  |  2.58<H>    Ca    8.9      29 Sep 2019 05:28  Phos  3.6     09-29  Mg     2.8     09-29    TPro  6.8  /  Alb  3.5  /  TBili  0.7  /  DBili  x   /  AST  34  /  ALT  36  /  AlkPhos  83  09-29               CBC pending        IMAGING: No new imaging    Tele: NSR 70-90s, 1st degree heart block INTERVAL EVENTS:  YI    SUBJECTIVE:  Lower back pain  No CP  Noticed shortness of breath starting this morning    ROS:  General - No fevers or chills  Cards - No chest pain or palpitations  Pulm - +SOB, - cough  GI - No abdominal pain, diarrhea, melena, or hematochezia. Last BM 2 days ago   - No dysuria or hematuria    OBJECTIVE:  Vitals Last 24 hrs  Vital Signs Last 24 Hrs  T(C): 36.4 (29 Sep 2019 04:40), Max: 36.8 (28 Sep 2019 13:18)  T(F): 97.6 (29 Sep 2019 04:40), Max: 98.2 (28 Sep 2019 13:18)  HR: 77 (29 Sep 2019 05:08) (68 - 85)  BP: 111/67 (29 Sep 2019 05:08) (95/60 - 169/61)  BP(mean): --  RR: 18 (29 Sep 2019 05:08) (17 - 18)  SpO2: 98% (29 Sep 2019 05:08) (94% - 98%)    IOs  I&O's Summary    28 Sep 2019 07:01  -  29 Sep 2019 07:00  --------------------------------------------------------  IN: 1100 mL / OUT: 2200 mL / NET: -1100 mL      PE:  General - No acute distress  Heart - Normal S1 and S2. +S3. No murmurs auscultated.  Lungs - Clear to auscultation bilaterally  GI - Abdomen soft, NT, ND.  Extremities - No lower extremity edema. Peripheral pulses intact. Calves nontender.  Skin - No rashes on extremities    LABS:  09-29    132<L>  |  98  |  44<H>  ----------------------------<  104<H>  4.5   |  22  |  2.58<H>    Ca    8.9      29 Sep 2019 05:28  Phos  3.6     09-29  Mg     2.8     09-29    TPro  6.8  /  Alb  3.5  /  TBili  0.7  /  DBili  x   /  AST  34  /  ALT  36  /  AlkPhos  83  09-29                                     12.7   11.40 )-----------( 245      ( 29 Sep 2019 09:35 )             41.0       IMAGING: No new imaging    Tele: NSR 70-90s, 1st degree heart block

## 2019-09-29 NOTE — PROGRESS NOTE ADULT - PROBLEM SELECTOR PLAN 4
Unclear baseline, suspect combination of hemodynamic WINSTON as well as possible MARLENA in setting of some likely underlying CKD.   - Continue to trend  - Renal US showed no hydronephrosis and only medical renal disease.   - Appreciate renal - likely due to contrast.  - Lasix on hold for now.

## 2019-09-29 NOTE — PROGRESS NOTE ADULT - PROBLEM SELECTOR PLAN 1
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 Seymour) and cardiology (Dr. Wick) consulted.  - Cardiac viability shows moderate residual viability. Interventional cardiology and CTS are not offering intervention. Cardiology may consider PCI after trying   - Heparin drip d/c'ed per HF  - ASA 81mg q Day  - Atorvastatin 80 mg q Day  - Metoprolol tartrate 25 mg po BID; converted to metoprolol ER 50mg daily.   - Holding Plavix i/s/o possible CABG. Continue to trend P2Y12 levels  -Abnormal TFT's possibly due to euthyroid sick syndrome. Will need follow up.   -Trend cardiac enzymes  -C/w telemetry.  - Hydralazine titrated to 25mg TID down from 37.5 in setting of WINSTON.

## 2019-09-29 NOTE — PROGRESS NOTE ADULT - PROBLEM SELECTOR PLAN 6
Per chart review, patient on amlodipine 5 mg q Day at home  - Hold amlodipine.  - Hydralazine and metoprolol for now.

## 2019-09-29 NOTE — PROGRESS NOTE ADULT - PROBLEM SELECTOR PLAN 3
- diuretic held in view of WINSTON, worsening hyponatremia  - Daily standing weights and strict I's/O's.  - Hydralazine 25 TID

## 2019-09-29 NOTE — PROGRESS NOTE ADULT - PROBLEM SELECTOR PLAN 5
Suspect leukocytosis is reactive  - Blood cultures x2 - NGTD.   - UA negative.   - CXR without consolidations.   - Monitor off antibiotics for now

## 2019-09-30 ENCOUNTER — APPOINTMENT (OUTPATIENT)
Dept: CARDIOTHORACIC SURGERY | Facility: HOSPITAL | Age: 84
End: 2019-09-30

## 2019-09-30 ENCOUNTER — TRANSCRIPTION ENCOUNTER (OUTPATIENT)
Age: 84
End: 2019-09-30

## 2019-09-30 LAB
ALBUMIN SERPL ELPH-MCNC: 4.4 G/DL — SIGNIFICANT CHANGE UP (ref 3.3–5)
ALP SERPL-CCNC: 106 U/L — SIGNIFICANT CHANGE UP (ref 40–120)
ALT FLD-CCNC: 51 U/L — HIGH (ref 10–45)
ANION GAP SERPL CALC-SCNC: 12 MMOL/L — SIGNIFICANT CHANGE UP (ref 5–17)
ANION GAP SERPL CALC-SCNC: 13 MMOL/L — SIGNIFICANT CHANGE UP (ref 5–17)
AST SERPL-CCNC: 45 U/L — HIGH (ref 10–40)
BILIRUB SERPL-MCNC: 0.8 MG/DL — SIGNIFICANT CHANGE UP (ref 0.2–1.2)
BUN SERPL-MCNC: 41 MG/DL — HIGH (ref 7–23)
BUN SERPL-MCNC: 44 MG/DL — HIGH (ref 7–23)
CALCIUM SERPL-MCNC: 8.7 MG/DL — SIGNIFICANT CHANGE UP (ref 8.4–10.5)
CALCIUM SERPL-MCNC: 9.3 MG/DL — SIGNIFICANT CHANGE UP (ref 8.4–10.5)
CHLORIDE SERPL-SCNC: 94 MMOL/L — LOW (ref 96–108)
CHLORIDE SERPL-SCNC: 95 MMOL/L — LOW (ref 96–108)
CO2 SERPL-SCNC: 23 MMOL/L — SIGNIFICANT CHANGE UP (ref 22–31)
CO2 SERPL-SCNC: 23 MMOL/L — SIGNIFICANT CHANGE UP (ref 22–31)
CREAT SERPL-MCNC: 2.11 MG/DL — HIGH (ref 0.5–1.3)
CREAT SERPL-MCNC: 2.22 MG/DL — HIGH (ref 0.5–1.3)
GLUCOSE SERPL-MCNC: 107 MG/DL — HIGH (ref 70–99)
GLUCOSE SERPL-MCNC: 162 MG/DL — HIGH (ref 70–99)
HCT VFR BLD CALC: 42.7 % — SIGNIFICANT CHANGE UP (ref 34.5–45)
HGB BLD-MCNC: 13.8 G/DL — SIGNIFICANT CHANGE UP (ref 11.5–15.5)
MAGNESIUM SERPL-MCNC: 2.8 MG/DL — HIGH (ref 1.6–2.6)
MCHC RBC-ENTMCNC: 28.6 PG — SIGNIFICANT CHANGE UP (ref 27–34)
MCHC RBC-ENTMCNC: 32.2 GM/DL — SIGNIFICANT CHANGE UP (ref 32–36)
MCV RBC AUTO: 88.7 FL — SIGNIFICANT CHANGE UP (ref 80–100)
PHOSPHATE SERPL-MCNC: 3.3 MG/DL — SIGNIFICANT CHANGE UP (ref 2.5–4.5)
PLATELET # BLD AUTO: 220 K/UL — SIGNIFICANT CHANGE UP (ref 150–400)
POTASSIUM SERPL-MCNC: 4.2 MMOL/L — SIGNIFICANT CHANGE UP (ref 3.5–5.3)
POTASSIUM SERPL-MCNC: 4.5 MMOL/L — SIGNIFICANT CHANGE UP (ref 3.5–5.3)
POTASSIUM SERPL-SCNC: 4.2 MMOL/L — SIGNIFICANT CHANGE UP (ref 3.5–5.3)
POTASSIUM SERPL-SCNC: 4.5 MMOL/L — SIGNIFICANT CHANGE UP (ref 3.5–5.3)
PROT SERPL-MCNC: 7.7 G/DL — SIGNIFICANT CHANGE UP (ref 6–8.3)
RBC # BLD: 4.82 M/UL — SIGNIFICANT CHANGE UP (ref 3.8–5.2)
RBC # FLD: 13.6 % — SIGNIFICANT CHANGE UP (ref 10.3–14.5)
SODIUM SERPL-SCNC: 130 MMOL/L — LOW (ref 135–145)
SODIUM SERPL-SCNC: 130 MMOL/L — LOW (ref 135–145)
WBC # BLD: 12 K/UL — HIGH (ref 3.8–10.5)
WBC # FLD AUTO: 12 K/UL — HIGH (ref 3.8–10.5)

## 2019-09-30 PROCEDURE — 99233 SBSQ HOSP IP/OBS HIGH 50: CPT | Mod: GC

## 2019-09-30 RX ORDER — DOCUSATE SODIUM 100 MG
100 CAPSULE ORAL DAILY
Refills: 0 | Status: DISCONTINUED | OUTPATIENT
Start: 2019-09-30 | End: 2019-10-04

## 2019-09-30 RX ORDER — CLOPIDOGREL BISULFATE 75 MG/1
75 TABLET, FILM COATED ORAL DAILY
Refills: 0 | Status: DISCONTINUED | OUTPATIENT
Start: 2019-09-30 | End: 2019-10-04

## 2019-09-30 RX ORDER — SODIUM CHLORIDE 5 G/100ML
500 INJECTION, SOLUTION INTRAVENOUS
Refills: 0 | Status: DISCONTINUED | OUTPATIENT
Start: 2019-09-30 | End: 2019-09-30

## 2019-09-30 RX ADMIN — HEPARIN SODIUM 5000 UNIT(S): 5000 INJECTION INTRAVENOUS; SUBCUTANEOUS at 17:53

## 2019-09-30 RX ADMIN — Medication 25 MILLIGRAM(S): at 12:52

## 2019-09-30 RX ADMIN — HEPARIN SODIUM 5000 UNIT(S): 5000 INJECTION INTRAVENOUS; SUBCUTANEOUS at 06:30

## 2019-09-30 RX ADMIN — Medication 81 MILLIGRAM(S): at 12:52

## 2019-09-30 RX ADMIN — Medication 40 MILLIGRAM(S): at 06:30

## 2019-09-30 RX ADMIN — Medication 25 MILLIGRAM(S): at 06:30

## 2019-09-30 RX ADMIN — ATORVASTATIN CALCIUM 80 MILLIGRAM(S): 80 TABLET, FILM COATED ORAL at 21:40

## 2019-09-30 RX ADMIN — Medication 25 MILLIGRAM(S): at 21:40

## 2019-09-30 RX ADMIN — SODIUM CHLORIDE 50 MILLILITER(S): 5 INJECTION, SOLUTION INTRAVENOUS at 06:33

## 2019-09-30 RX ADMIN — Medication 100 MILLIGRAM(S): at 21:40

## 2019-09-30 RX ADMIN — SENNA PLUS 2 TABLET(S): 8.6 TABLET ORAL at 21:40

## 2019-09-30 RX ADMIN — CLOPIDOGREL BISULFATE 75 MILLIGRAM(S): 75 TABLET, FILM COATED ORAL at 16:03

## 2019-09-30 NOTE — PROGRESS NOTE ADULT - PROBLEM SELECTOR PLAN 1
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 Seymour) and cardiology (Dr. Wick) consulted.  - Cardiac viability shows moderate residual viability. Interventional cardiology and CTS are not offering intervention. Cardiology may consider PCI after trying   - Heparin drip d/c'ed per HF  - ASA 81mg q Day  - Atorvastatin 80 mg q Day  - Metoprolol tartrate 25 mg po BID; converted to metoprolol ER 50mg daily.   - Holding Plavix i/s/o possible CABG. Continue to trend P2Y12 levels  -Abnormal TFT's possibly due to euthyroid sick syndrome. Will need follow up.   -Trend cardiac enzymes  -C/w telemetry.  - Hydralazine titrated to 25mg TID down from 37.5 in setting of WINSTON. 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 Seymour) and cardiology (Dr. Wick) consulted.  - Cardiac viability shows moderate residual viability. Interventional cardiology and CTS are not offering intervention. Cardiology may consider PCI after trying medical optimization.  - Heparin drip d/c'ed per HF  - ASA 81mg q Day  - Atorvastatin 80 mg q Day  - Metoprolol tartrate 25 mg po BID; converted to metoprolol ER 50mg daily.   - Holding Plavix i/s/o possible CABG. Continue to trend P2Y12 levels  -Abnormal TFT's possibly due to euthyroid sick syndrome. Will need follow up.   -Trend cardiac enzymes  -C/w telemetry.  - Hydralazine titrated to 25mg TID down from 37.5 in setting of WINSTON. 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 Seymour) and cardiology (Dr. Wick) consulted.  - Cardiac viability shows moderate residual viability. Interventional cardiology and CTS are not offering intervention. Cardiology may consider PCI after trying medical optimization.  - Heparin drip d/c'ed per HF  - ASA 81mg q Day  - Atorvastatin 80 mg q Day  - Metoprolol tartrate 25 mg po BID; converted to metoprolol ER 50mg daily.   -Abnormal TFT's possibly due to euthyroid sick syndrome. Will need follow up.   -Trend cardiac enzymes  -C/w telemetry.  - Hydralazine titrated to 25mg TID down from 37.5 in setting of WINSTON.

## 2019-09-30 NOTE — PROGRESS NOTE ADULT - ASSESSMENT
ASSESSMENT/PLAN: 	  82 yo F with HTN, HF who presented to OSH with CP and SOB for the last few weeks. She had no EKG changes but noted to have trop of 1.38 and she was transferred to [facility] for cardiac cath, found to have triple vessel disease, as well as elevated filling pressures on RHC. Not a candidate for CABG or PCI due to poor targets. Now asymptomatic and euvolemic.    1. Ischemic cardiomyopathy, EF 30%  - c/w lasix 40mg daily  - c/w hydralazine to 25mg TID, hold for SBP<90  - c/w metop to succinate 50mg daily  - hold ARB due to WINSTON  - strict I/Os, daily standing weights    2. CAD  - s/p cath with TVD, viability study showing significant viable tissue  - CTS and interventional cards to review imaging and decide on best revascularization strategy  - c/w asa, atorvastatin 80mg tacos  - start plavix 75mg daily (make sure patient was loaded)  - walk patient several times around the unit to ensure no angina - if patient has angina, will need to reconsider PCI    3. Claudication  - notes bilateral calf pain when walking  - obtain TIA and LE arterial dopplers (can be done as outpatient)  - encourage ambulation    4. HTN  - c/w BB, hydralazine    5. WINSTON on CKD  - Cr now improving, baseline 1.9 per family  - likely due to contrast load from cath and CT PE at OSH  - hold ARB      Adalid Newton MD  Cardiology Fellow   [phone], M-F 7:30A-5P    All Cardiology service information can be found on [email or web address], password: [email or web address].

## 2019-09-30 NOTE — PROGRESS NOTE ADULT - ATTENDING COMMENTS
I have seen this patient with the fellow and agree with their assessment and plan. In addition,    #WINSTON - secondary to contrast-induced nephropathy + hemodynamic ATN. Serum creatinine improving gradually.     # Hyponatremia - still at 130. Please repeat urine osm. We will start [id number] BID to help excrete electrolyte-free water.     #Hypertension - BP improved after decreasing the dose of hydralazine. Continue current dose of medication.     # Medication toxicity Monitoring: medication dose reviewed. Please dose medications to eGFR<20    The rest of the recommendations as per fellow's note.    Brielle Umanzor MD  Attending Nephrologist  [phone] I have seen this patient with the fellow and agree with their assessment and plan. In addition,    #WINSTON - secondary to contrast-induced nephropathy + hemodynamic ATN. Serum creatinine improving gradually.     # Hyponatremia - still at 130. Please repeat urine osm. We will start [id number] BID to help excrete electrolyte-free water.     #Hypertension - BP improved after decreasing the dose of hydralazine. Continue current dose of medication.     # CKD stage 3/4. Baseline serum creatinine 1.8. Renal sonogram show small kidneys and parenchymal cyst. UA - bland. Etiology of CKD unclear, but likely from microvascular disease and long-standing uncontrolled hypertension.     # Medication toxicity Monitoring: medication dose reviewed. Please dose medications to eGFR<20    The rest of the recommendations as per fellow's note.    Brielle Umanzor MD  Attending Nephrologist  [phone]

## 2019-09-30 NOTE — PROGRESS NOTE ADULT - PROBLEM SELECTOR PLAN 3
Dropped hydralazine to 25 mg daily, metoprolol 50 mg daily. Avoid hypotensive episodes. Continue to hold diuretics as patient appears euvolemic and start UreNa.

## 2019-09-30 NOTE — PROGRESS NOTE ADULT - SUBJECTIVE AND OBJECTIVE BOX
[facility] Division of Kidney Diseases & Hypertension  FOLLOW UP NOTE  [phone]--------------------------------------------------------------------------------  Chief Complaint:Non-ST elevation myocardial infarction (NSTEMI)      24 hour events/subjective: No acute events overnight. Patient resting comfortably in bed. No acute complaints except has some back pain from the bed. Sodium noted to be 130 today. Vitals and labs reviewed. Vital signs stable. Labs otherwise grossly stable as well and creatinine noted to be continued downtrend now to 2.1        PAST HISTORY  --------------------------------------------------------------------------------  No significant changes to PMH, PSH, FHx, SHx, unless otherwise noted    ALLERGIES & MEDICATIONS  --------------------------------------------------------------------------------  Allergies    No Known Allergies    Intolerances      Standing Inpatient Medications  aspirin enteric coated 81 milliGRAM(s) Oral daily  atorvastatin 80 milliGRAM(s) Oral at bedtime  clopidogrel Tablet 75 milliGRAM(s) Oral daily  heparin  Injectable 5000 Unit(s) SubCutaneous two times a day  hydrALAZINE 25 milliGRAM(s) Oral three times a day  metoprolol succinate ER 50 milliGRAM(s) Oral daily  senna 2 Tablet(s) Oral at bedtime  Ure-Na 15 Gram(s) 15 Gram(s) Oral two times a day    PRN Inpatient Medications  acetaminophen   Tablet .. 650 milliGRAM(s) Oral every 6 hours PRN      REVIEW OF SYSTEMS  --------------------------------------------------------------------------------  Gen: No  fevers/chills  Skin: No rashes  Head/Eyes/Ears/Mouth: No headache; Normal hearing; Normal vision w/o blurriness  Respiratory: No dyspnea, cough, wheezing, hemoptysis  CV: No chest pain, PND, orthopnea  GI: No abdominal pain, diarrhea, constipation, nausea, vomiting  : No increased frequency, dysuria, hematuria, nocturia  MSK: Back pain  Neuro: No dizziness/lightheadedness, weakness, seizures, numbness, tingling      All other systems were reviewed and are negative, except as noted.    VITALS/PHYSICAL EXAM  --------------------------------------------------------------------------------  T(C): 36.7 (09-30-19 @ 12:51), Max: 36.8 (09-29-19 @ 20:49)  HR: 81 (09-30-19 @ 12:51) (79 - 87)  BP: 125/71 (09-30-19 @ 12:51) (101/68 - 125/71)  RR: 17 (09-30-19 @ 12:51) (16 - 19)  SpO2: 97% (09-30-19 @ 12:51) (95% - 98%)  Wt(kg): --        09-29-19 @ 07:01  -  09-30-19 @ 07:00  --------------------------------------------------------  IN: 490 mL / OUT: 1600 mL / NET: -1110 mL    09-30-19 @ 07:01  -  09-30-19 @ 14:28  --------------------------------------------------------  IN: 120 mL / OUT: 600 mL / NET: -480 mL      Physical Exam:  	Gen: NAD, well-appearing  	HEENT: PERRL, supple neck  	Pulm: Crackles at bases. On O2  	CV:  S1S2  	Abd: +BS, soft   	MSK: No B/L Lower ext edema  	Neuro: No focal deficits  	Skin: Warm, without rashes  	Vascular: No cyanosis    LABS/STUDIES  --------------------------------------------------------------------------------              13.8   12.0  >-----------<  220      [09-30-19 @ 05:02]              42.7     130  |  95  |  41  ----------------------------<  162      [09-30-19 @ 13:16]  4.2   |  23  |  2.11        Ca     8.7     [09-30-19 @ 13:16]      Mg     2.8     [09-30-19 @ 05:02]      Phos  3.3     [09-30-19 @ 05:02]    TPro  7.7  /  Alb  4.4  /  TBili  0.8  /  DBili  x   /  AST  45  /  ALT  51  /  AlkPhos  106  [09-30-19 @ 05:02]          Creatinine Trend:  SCr 2.11 [09-30 @ 13:16]  SCr 2.22 [09-30 @ 05:02]  SCr 2.28 [09-29 @ 20:48]  SCr 2.34 [09-29 @ 13:50]  SCr 2.58 [09-29 @ 05:28]    Urinalysis - [09-29-19 @ 01:33]      Color Light Yellow / Appearance Clear / SG 1.009 / pH 7.0      Gluc Trace / Ketone Negative  / Bili Negative / Urobili <2 mg/dL       Blood Negative / Protein Trace / Leuk Est Negative / Nitrite Negative      RBC  / WBC  / Hyaline  / Gran  / Sq Epi  / Non Sq Epi  / Bacteria     Urine Creatinine 16      [09-25-19 @ 01:54]  Urine Sodium 33      [09-29-19 @ 05:33]  Urine Urea Nitrogen 175      [09-25-19 @ 07:54]  Urine Chloride 84      [09-25-19 @ 01:54]  Urine Osmolality 218      [09-29-19 @ 09:27]    HbA1c 6.1      [09-25-19 @ 09:57]  TSH 0.19      [09-27-19 @ 13:35]  Lipid: chol 214, , HDL 78,       [09-25-19 @ 08:44]

## 2019-09-30 NOTE — PROGRESS NOTE ADULT - PROBLEM SELECTOR PLAN 2
Patient with euvolemic/hypervolemic hyponatremia. Free water restriction for now to 1L and continue to monitor. For now patient appears more euvolemic and as such would not continue diuresis along with patient having recovering renal function. Recommend starting UreNa 15 mg BID to help with sodium and some diuresis. Continue to monitor sodium q4-6h and call nephrology fellow with results. Patient with euvolemic/hypervolemic hyponatremia. Free water restriction for now to 1L and continue to monitor. For now patient appears more euvolemic and as such would not continue furosemide. Recommend starting UreNa 15 mg BID to help excrete electrolyte-free water.  Continue to monitor sodium q4-6h and call nephrology fellow with results.

## 2019-09-30 NOTE — DISCHARGE NOTE NURSING/CASE MANAGEMENT/SOCIAL WORK - PATIENT PORTAL LINK FT
You can access the FollowMyHealth Patient Portal offered by [facility] by registering at the following website: [email or web address]. By joining [employer]’s FollowMyHealth portal, you will also be able to view your health information using other applications (apps) compatible with our system.

## 2019-09-30 NOTE — PROGRESS NOTE ADULT - SUBJECTIVE AND OBJECTIVE BOX
INTERVAL EVENTS: YI    SUBJECTIVE:  [place] : [id number]  Lower back pain  No chest pain  No shortness of breath    Per nurse, appears winded after going to the bathroom.    ROS:  General - No fevers or chills  Cards - No chest pain or palpitations  Pulm - No cough or shortness of breath  GI - No abdominal pain, diarrhea, melena, or hematochezia. Last BM was yesterday.   - No dysuria or hematuria    OBJECTIVE:  Vitals Last 24 hrs  Vital Signs Last 24 Hrs  T(C): 36.7 (30 Sep 2019 04:30), Max: 37.1 (29 Sep 2019 10:00)  T(F): 98 (30 Sep 2019 04:30), Max: 98.8 (29 Sep 2019 10:00)  HR: 80 (30 Sep 2019 05:42) (79 - 87)  BP: 104/62 (30 Sep 2019 05:42) (101/68 - 117/76)  BP(mean): --  RR: 16 (30 Sep 2019 05:42) (16 - 19)  SpO2: 97% (30 Sep 2019 05:42) (95% - 99%)    IOs  I&O's Summary    28 Sep 2019 07:01  -  29 Sep 2019 07:00  --------------------------------------------------------  IN: 1100 mL / OUT: 2200 mL / NET: -1100 mL    29 Sep 2019 07:01  -  30 Sep 2019 06:56  --------------------------------------------------------  IN: 490 mL / OUT: 1600 mL / NET: -1110 mL        PE:  General -   Heart - Normal S1 and S2. No murmurs auscultated.  Lungs - Clear to auscultation bilaterally  GI - Abdomen soft, NT, ND.  Extremities - No lower extremity edema. Peripheral pulses intact. Calves nontender.  Skin - No rashes on extremities    LABS:  09-30    130<L>  |  94<L>  |  44<H>  ----------------------------<  107<H>  4.5   |  23  |  2.22<H>    Ca    9.3      30 Sep 2019 05:02  Phos  3.3     09-30  Mg     2.8     09-30    TPro  7.7  /  Alb  4.4  /  TBili  0.8  /  DBili  x   /  AST  45<H>  /  ALT  51<H>  /  AlkPhos  106  09-30                            13.8   12.0  )-----------( 220      ( 30 Sep 2019 05:02 )             42.7           IMAGING:   CXR pending formal read. Per author's interpretation, unchanged cxr from prior clear cxr on 9/23 INTERVAL EVENTS: YI    SUBJECTIVE:  [place] : [id number]  Lower back pain  No chest pain  No shortness of breath    Per nurse, appears winded after going to the bathroom.    ROS:  General - No fevers or chills  Cards - No chest pain or palpitations  Pulm - No cough or shortness of breath  GI - No abdominal pain, diarrhea, melena, or hematochezia. Last BM was yesterday.   - No dysuria or hematuria    OBJECTIVE:  Vitals Last 24 hrs  Vital Signs Last 24 Hrs  T(C): 36.7 (30 Sep 2019 04:30), Max: 37.1 (29 Sep 2019 10:00)  T(F): 98 (30 Sep 2019 04:30), Max: 98.8 (29 Sep 2019 10:00)  HR: 80 (30 Sep 2019 05:42) (79 - 87)  BP: 104/62 (30 Sep 2019 05:42) (101/68 - 117/76)  BP(mean): --  RR: 16 (30 Sep 2019 05:42) (16 - 19)  SpO2: 97% (30 Sep 2019 05:42) (95% - 99%)    IOs  I&O's Summary    28 Sep 2019 07:01  -  29 Sep 2019 07:00  --------------------------------------------------------  IN: 1100 mL / OUT: 2200 mL / NET: -1100 mL    29 Sep 2019 07:01  -  30 Sep 2019 06:56  --------------------------------------------------------  IN: 490 mL / OUT: 1600 mL / NET: -1110 mL        PE:  General - NAD  Heart - Normal S1 and S2. No murmurs auscultated. No additional heart sounds.  Lungs - Clear to auscultation bilaterally. No crackles  GI - Abdomen soft, NT, ND.  Extremities - No lower extremity edema. Peripheral pulses intact. Calves nontender.  Skin - No rashes on extremities    LABS:  09-30    130<L>  |  94<L>  |  44<H>  ----------------------------<  107<H>  4.5   |  23  |  2.22<H>    Ca    9.3      30 Sep 2019 05:02  Phos  3.3     09-30  Mg     2.8     09-30    TPro  7.7  /  Alb  4.4  /  TBili  0.8  /  DBili  x   /  AST  45<H>  /  ALT  51<H>  /  AlkPhos  106  09-30                            13.8   12.0  )-----------( 220      ( 30 Sep 2019 05:02 )             42.7           IMAGING:   CXR pending formal read. Per author's interpretation, unchanged cxr from prior clear cxr on 9/23      Tele: nsr 70-80s, 1st degree heart block, no ectopy.

## 2019-09-30 NOTE — CHART NOTE - NSCHARTNOTEFT_GEN_A_CORE
Walked patient twice around U. She walked at a moderate pace with her walker, although she was not leaning on it heavily. She walked at a normal pace and could talk with author and her daughter.    However, when she sat back down on her bed, physical therapy observed she was breathing heavily. Translated by her daughter, she said that she was having chest pain. Per physical therapy, this communication was very clear and he has high certainty that this is what was reported. When the author returned, both the daughter and patient denied any chest pain when using an .    She also noticed increasing calf pain as she walked. It improved as she rested.    Tele was NSR 90-110s without any events during this time.

## 2019-09-30 NOTE — PROGRESS NOTE ADULT - ATTENDING COMMENTS
My overall assessment is that this is a 84 YO F with a history of ACC/AHA Stage C ischemic cardiomyopathy, HTN, and CKD III (baseline Cr 1.8) who presented with NSTEMI and found to have 3v CAD now awaiting revascularization options for which heart failure cardiology consulted for further management. She is mildly overloaded and does not have reduced cardiac output on invasive hemodynamics despite very high SVR at time of assessment. She has room for afterload reduction and no prior HF hospitalizations. Would proceed with revascularization while optimizing medical therapy.     Review of pertinent studies reveals:  NM viability: gated EF 37%, mostly viable myocardium aside from distal anteroseptal/anterior/inferolateral/apical walls which have minimal viability     TTE 9/24/19: very limited images (only contrast enhanced images helpful), LV non-dilated, EF ~30%, akinesis of inferolateral wall and apex    LHC 9/24/19: severe diffuse mid LAD stenosis, OM2 subtotally occluded, ramus occluded, RCA occluded    RHC 9/24/19: RA 10, PA 48/23 (34), PCWP 29, Sherry CO/CI 3.7/2.2,  with SVR 1990      The plan for today is as follows:  - continue 25 mg TID and metoprolol succinate 50 mg daily. avoiding aggressive uptitration and RAAS inhibitor with WINSTON  - continue to hold lasix, appears euvolemic   - viability study showing mostly viable myocardium, per Dr. Wick and cardiac surgery she is not a good candidate for either form of revascularization so will continue medical management   - continue ASA/statin  - start P2Y12 inhibitor if she will not undergo surgery for treatment of NSTEMI  - would walk today and ensure she has no angina    She is stable for discharge from HF perspective once renal function stabilizes and she is confirmed to not have angina.     Please feel free to call me with any questions: [phone] .     Doing well overall. Unable to uptitrate medical therapy significantly more as we are allowing for slightly higher BP for renal perfusion and cannot start RAAS inhibitor with her WINSTON. She appears euvolemic without diuretics and renal function is improving.

## 2019-09-30 NOTE — PROGRESS NOTE ADULT - PROBLEM SELECTOR PLAN 1
Patient with WINSTON on CKD in the setting of MARLENA from recent cardiac catheterization. Patient with CKD likely due to uncontrolled HTN as patient had creatinine ~1.8 in 1/2018. Patient has bland urine sediment and U/S of the kidneys showing multiple renal cysts. Will continue to follow for now however for acute component will continue to monitor and recommend no additional contrast for at least 48-72 hrs. Otherwise avoid other nephrotoxic agents, avoid hypotensive episodes and renally dose all medications. Currently creatinine 2.1 and downtrending.

## 2019-09-30 NOTE — DISCHARGE NOTE NURSING/CASE MANAGEMENT/SOCIAL WORK - NSDCPEPT PROEDHF_GEN_ALL_CORE
Report signs and symptoms to primary care provider/Low salt diet/Call primary care provider for follow up after discharge/Activities as tolerated/Monitor weight daily

## 2019-09-30 NOTE — PROGRESS NOTE ADULT - ASSESSMENT
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

## 2019-09-30 NOTE — PROGRESS NOTE ADULT - SUBJECTIVE AND OBJECTIVE BOX
Had episode of chest pain while saying laying down last night, resolved spontaneously. Denies any SOB, orthopnea, palp. Has been walking around the unit w/ no CP.    MEDICATIONS:  aspirin enteric coated 81 milliGRAM(s) Oral daily  clopidogrel Tablet 75 milliGRAM(s) Oral daily  furosemide    Tablet 40 milliGRAM(s) Oral daily  heparin  Injectable 5000 Unit(s) SubCutaneous two times a day  hydrALAZINE 25 milliGRAM(s) Oral three times a day  metoprolol succinate ER 50 milliGRAM(s) Oral daily  acetaminophen   Tablet .. 650 milliGRAM(s) Oral every 6 hours PRN  senna 2 Tablet(s) Oral at bedtime  atorvastatin 80 milliGRAM(s) Oral at bedtime        REVIEW OF SYSTEMS:    CONSTITUTIONAL: No weakness, fevers or chills  EYES/ENT: No visual changes;  No dysphagia  RESPIRATORY: No cough, wheezing, hemoptysis; No shortness of breath  CARDIOVASCULAR: No chest pain or palpitations; No lower extremity edema  GASTROINTESTINAL: No abdominal or epigastric pain. No nausea, vomiting, or hematemesis  GENITOURINARY: No dysuria, frequency or hematuria  NEUROLOGICAL: No numbness or weakness  SKIN: No itching, burning, rashes, or lesions   HEME: No bleeding or bruising  MSK: No joint pains or muscle pains  All other review of systems is negative unless indicated above.    PHYSICAL EXAM:  T(C): 36.7 (09-30-19 @ 12:51), Max: 36.8 (09-29-19 @ 20:49)  HR: 81 (09-30-19 @ 12:51) (79 - 87)  BP: 125/71 (09-30-19 @ 12:51) (101/68 - 125/71)  RR: 17 (09-30-19 @ 12:51) (16 - 19)  SpO2: 97% (09-30-19 @ 12:51) (95% - 98%)  Wt(kg): --  I&O's Summary    29 Sep 2019 07:01  -  30 Sep 2019 07:00  --------------------------------------------------------  IN: 490 mL / OUT: 1600 mL / NET: -1110 mL    30 Sep 2019 07:01  -  30 Sep 2019 13:01  --------------------------------------------------------  IN: 120 mL / OUT: 600 mL / NET: -480 mL        Appearance: Normal	  HEENT:   Normal oral mucosa  Cardiovascular: Normal S1 S2, No JVD, No murmurs, No edema  Respiratory: Lungs clear to auscultation	  Psychiatry: A & O x 3, Mood & affect appropriate  Gastrointestinal:  Soft, Non-tender, + BS	  Skin: No rashes, No ecchymoses, No cyanosis	  Neurologic: Non-focal  Extremities: Normal range of motion, No clubbing, cyanosis        LABS:	 	    CBC Full  -  ( 30 Sep 2019 05:02 )  WBC Count : 12.0 K/uL  Hemoglobin : 13.8 g/dL  Hematocrit : 42.7 %  Platelet Count - Automated : 220 K/uL  Mean Cell Volume : 88.7 fl  Mean Cell Hemoglobin : 28.6 pg  Mean Cell Hemoglobin Concentration : 32.2 gm/dL  Auto Neutrophil # : x  Auto Lymphocyte # : x  Auto Monocyte # : x  Auto Eosinophil # : x  Auto Basophil # : x  Auto Neutrophil % : x  Auto Lymphocyte % : x  Auto Monocyte % : x  Auto Eosinophil % : x  Auto Basophil % : x    09-30    130<L>  |  94<L>  |  44<H>  ----------------------------<  107<H>  4.5   |  23  |  2.22<H>  09-29    126<L>  |  91<L>  |  43<H>  ----------------------------<  105<H>  5.2   |  22  |  2.28<H>    Ca    9.3      30 Sep 2019 05:02  Ca    9.1      29 Sep 2019 20:48  Phos  3.3     09-30  Phos  3.6     09-29  Mg     2.8     09-30  Mg     2.8     09-29    TPro  7.7  /  Alb  4.4  /  TBili  0.8  /  DBili  x   /  AST  45<H>  /  ALT  51<H>  /  AlkPhos  106  09-30  TPro  6.8  /  Alb  3.5  /  TBili  0.7  /  DBili  x   /  AST  34  /  ALT  36  /  AlkPhos  83  09-29

## 2019-10-01 ENCOUNTER — TRANSCRIPTION ENCOUNTER (OUTPATIENT)
Age: 84
End: 2019-10-01

## 2019-10-01 LAB
ALBUMIN SERPL ELPH-MCNC: 3.6 G/DL — SIGNIFICANT CHANGE UP (ref 3.3–5)
ALP SERPL-CCNC: 97 U/L — SIGNIFICANT CHANGE UP (ref 40–120)
ALT FLD-CCNC: 42 U/L — SIGNIFICANT CHANGE UP (ref 10–45)
ANION GAP SERPL CALC-SCNC: 10 MMOL/L — SIGNIFICANT CHANGE UP (ref 5–17)
ANION GAP SERPL CALC-SCNC: 11 MMOL/L — SIGNIFICANT CHANGE UP (ref 5–17)
ANION GAP SERPL CALC-SCNC: 12 MMOL/L — SIGNIFICANT CHANGE UP (ref 5–17)
AST SERPL-CCNC: 35 U/L — SIGNIFICANT CHANGE UP (ref 10–40)
BILIRUB SERPL-MCNC: 0.7 MG/DL — SIGNIFICANT CHANGE UP (ref 0.2–1.2)
BUN SERPL-MCNC: 51 MG/DL — HIGH (ref 7–23)
BUN SERPL-MCNC: 58 MG/DL — HIGH (ref 7–23)
BUN SERPL-MCNC: 69 MG/DL — HIGH (ref 7–23)
CALCIUM SERPL-MCNC: 8.9 MG/DL — SIGNIFICANT CHANGE UP (ref 8.4–10.5)
CALCIUM SERPL-MCNC: 9 MG/DL — SIGNIFICANT CHANGE UP (ref 8.4–10.5)
CALCIUM SERPL-MCNC: 9.1 MG/DL — SIGNIFICANT CHANGE UP (ref 8.4–10.5)
CHLORIDE SERPL-SCNC: 90 MMOL/L — LOW (ref 96–108)
CHLORIDE SERPL-SCNC: 92 MMOL/L — LOW (ref 96–108)
CHLORIDE SERPL-SCNC: 96 MMOL/L — SIGNIFICANT CHANGE UP (ref 96–108)
CO2 SERPL-SCNC: 22 MMOL/L — SIGNIFICANT CHANGE UP (ref 22–31)
CO2 SERPL-SCNC: 23 MMOL/L — SIGNIFICANT CHANGE UP (ref 22–31)
CO2 SERPL-SCNC: 24 MMOL/L — SIGNIFICANT CHANGE UP (ref 22–31)
CREAT SERPL-MCNC: 2.05 MG/DL — HIGH (ref 0.5–1.3)
CREAT SERPL-MCNC: 2.06 MG/DL — HIGH (ref 0.5–1.3)
CREAT SERPL-MCNC: 2.09 MG/DL — HIGH (ref 0.5–1.3)
GLUCOSE SERPL-MCNC: 114 MG/DL — HIGH (ref 70–99)
GLUCOSE SERPL-MCNC: 118 MG/DL — HIGH (ref 70–99)
GLUCOSE SERPL-MCNC: 161 MG/DL — HIGH (ref 70–99)
HCT VFR BLD CALC: 36.2 % — SIGNIFICANT CHANGE UP (ref 34.5–45)
HGB BLD-MCNC: 11.7 G/DL — SIGNIFICANT CHANGE UP (ref 11.5–15.5)
MAGNESIUM SERPL-MCNC: 2.4 MG/DL — SIGNIFICANT CHANGE UP (ref 1.6–2.6)
MCHC RBC-ENTMCNC: 28.3 PG — SIGNIFICANT CHANGE UP (ref 27–34)
MCHC RBC-ENTMCNC: 32.3 GM/DL — SIGNIFICANT CHANGE UP (ref 32–36)
MCV RBC AUTO: 87.7 FL — SIGNIFICANT CHANGE UP (ref 80–100)
PHOSPHATE SERPL-MCNC: 3.1 MG/DL — SIGNIFICANT CHANGE UP (ref 2.5–4.5)
PLATELET # BLD AUTO: 216 K/UL — SIGNIFICANT CHANGE UP (ref 150–400)
POTASSIUM SERPL-MCNC: 4.1 MMOL/L — SIGNIFICANT CHANGE UP (ref 3.5–5.3)
POTASSIUM SERPL-MCNC: 4.4 MMOL/L — SIGNIFICANT CHANGE UP (ref 3.5–5.3)
POTASSIUM SERPL-MCNC: 4.4 MMOL/L — SIGNIFICANT CHANGE UP (ref 3.5–5.3)
POTASSIUM SERPL-SCNC: 4.1 MMOL/L — SIGNIFICANT CHANGE UP (ref 3.5–5.3)
POTASSIUM SERPL-SCNC: 4.4 MMOL/L — SIGNIFICANT CHANGE UP (ref 3.5–5.3)
POTASSIUM SERPL-SCNC: 4.4 MMOL/L — SIGNIFICANT CHANGE UP (ref 3.5–5.3)
PROT SERPL-MCNC: 6.9 G/DL — SIGNIFICANT CHANGE UP (ref 6–8.3)
RBC # BLD: 4.13 M/UL — SIGNIFICANT CHANGE UP (ref 3.8–5.2)
RBC # FLD: 14.8 % — HIGH (ref 10.3–14.5)
SODIUM SERPL-SCNC: 123 MMOL/L — LOW (ref 135–145)
SODIUM SERPL-SCNC: 128 MMOL/L — LOW (ref 135–145)
SODIUM SERPL-SCNC: 129 MMOL/L — LOW (ref 135–145)
T4 AB SER-ACNC: 7.8 UG/DL — SIGNIFICANT CHANGE UP (ref 4.6–12)
WBC # BLD: 10.53 K/UL — HIGH (ref 3.8–10.5)
WBC # FLD AUTO: 10.53 K/UL — HIGH (ref 3.8–10.5)

## 2019-10-01 PROCEDURE — 99233 SBSQ HOSP IP/OBS HIGH 50: CPT | Mod: GC

## 2019-10-01 PROCEDURE — 99232 SBSQ HOSP IP/OBS MODERATE 35: CPT

## 2019-10-01 RX ORDER — CLOPIDOGREL BISULFATE 75 MG/1
1 TABLET, FILM COATED ORAL
Qty: 0 | Refills: 0 | DISCHARGE

## 2019-10-01 RX ORDER — SODIUM CHLORIDE 5 G/100ML
500 INJECTION, SOLUTION INTRAVENOUS
Refills: 0 | Status: DISCONTINUED | OUTPATIENT
Start: 2019-10-01 | End: 2019-10-01

## 2019-10-01 RX ORDER — HYDRALAZINE HCL 50 MG
25 TABLET ORAL THREE TIMES A DAY
Refills: 0 | Status: DISCONTINUED | OUTPATIENT
Start: 2019-10-01 | End: 2019-10-01

## 2019-10-01 RX ORDER — ALBUTEROL 90 UG/1
2 AEROSOL, METERED ORAL
Qty: 0 | Refills: 0 | DISCHARGE

## 2019-10-01 RX ORDER — HYDRALAZINE HCL 50 MG
25 TABLET ORAL THREE TIMES A DAY
Refills: 0 | Status: DISCONTINUED | OUTPATIENT
Start: 2019-10-01 | End: 2019-10-04

## 2019-10-01 RX ORDER — ASPIRIN/CALCIUM CARB/MAGNESIUM 324 MG
1 TABLET ORAL
Qty: 0 | Refills: 0 | DISCHARGE

## 2019-10-01 RX ORDER — HYDRALAZINE HCL 50 MG
37.5 TABLET ORAL THREE TIMES A DAY
Refills: 0 | Status: DISCONTINUED | OUTPATIENT
Start: 2019-10-01 | End: 2019-10-01

## 2019-10-01 RX ORDER — METOPROLOL TARTRATE 50 MG
75 TABLET ORAL DAILY
Refills: 0 | Status: DISCONTINUED | OUTPATIENT
Start: 2019-10-01 | End: 2019-10-04

## 2019-10-01 RX ORDER — HYDRALAZINE HCL 50 MG
35 TABLET ORAL THREE TIMES A DAY
Refills: 0 | Status: DISCONTINUED | OUTPATIENT
Start: 2019-10-01 | End: 2019-10-01

## 2019-10-01 RX ORDER — METOPROLOL TARTRATE 50 MG
1 TABLET ORAL
Qty: 0 | Refills: 0 | DISCHARGE

## 2019-10-01 RX ORDER — ATORVASTATIN CALCIUM 80 MG/1
1 TABLET, FILM COATED ORAL
Qty: 0 | Refills: 0 | DISCHARGE

## 2019-10-01 RX ORDER — SODIUM,POTASSIUM PHOSPHATES 278-250MG
1 POWDER IN PACKET (EA) ORAL
Qty: 0 | Refills: 0 | DISCHARGE

## 2019-10-01 RX ORDER — FUROSEMIDE 40 MG
40 TABLET ORAL DAILY
Refills: 0 | Status: DISCONTINUED | OUTPATIENT
Start: 2019-10-01 | End: 2019-10-04

## 2019-10-01 RX ADMIN — CLOPIDOGREL BISULFATE 75 MILLIGRAM(S): 75 TABLET, FILM COATED ORAL at 13:34

## 2019-10-01 RX ADMIN — Medication 100 MILLIGRAM(S): at 13:34

## 2019-10-01 RX ADMIN — Medication 81 MILLIGRAM(S): at 13:34

## 2019-10-01 RX ADMIN — Medication 25 MILLIGRAM(S): at 06:40

## 2019-10-01 RX ADMIN — HEPARIN SODIUM 5000 UNIT(S): 5000 INJECTION INTRAVENOUS; SUBCUTANEOUS at 18:59

## 2019-10-01 RX ADMIN — Medication 650 MILLIGRAM(S): at 04:13

## 2019-10-01 RX ADMIN — HEPARIN SODIUM 5000 UNIT(S): 5000 INJECTION INTRAVENOUS; SUBCUTANEOUS at 06:41

## 2019-10-01 RX ADMIN — Medication 50 MILLIGRAM(S): at 06:40

## 2019-10-01 RX ADMIN — SENNA PLUS 2 TABLET(S): 8.6 TABLET ORAL at 22:12

## 2019-10-01 RX ADMIN — Medication 25 MILLIGRAM(S): at 22:12

## 2019-10-01 RX ADMIN — Medication 35 MILLIGRAM(S): at 13:34

## 2019-10-01 RX ADMIN — Medication 650 MILLIGRAM(S): at 03:13

## 2019-10-01 RX ADMIN — ATORVASTATIN CALCIUM 80 MILLIGRAM(S): 80 TABLET, FILM COATED ORAL at 22:12

## 2019-10-01 RX ADMIN — SODIUM CHLORIDE 50 MILLILITER(S): 5 INJECTION, SOLUTION INTRAVENOUS at 10:17

## 2019-10-01 NOTE — DISCHARGE NOTE PROVIDER - NSDCCPCAREPLAN_GEN_ALL_CORE_FT
PRINCIPAL DISCHARGE DIAGNOSIS  Diagnosis: NSTEMI (non-ST elevated myocardial infarction)  Assessment and Plan of Treatment: You had a non-ST elevation myocardial infarction. This means that parts of your heart were not receiving enough blood to function. This makes your heart weaker. You were found to have an ejection fraction of 37%. You also have three of your coronary arteries obstructed. You were evaluated for surgery and catheterization to resolve these obstructions, but it was determined that the cons outweighed the pros. You received a heparin drip and other medication to medically treat your NSTEMI. You were optimized your heart medications and found to be medically stable. Please take all of your medications and follow up with cardiologists as an outpatient.      SECONDARY DISCHARGE DIAGNOSES  Diagnosis: Hyponatremia  Assessment and Plan of Treatment: You had low sodium in your blood. This was likely due to poor urine output from your kidney injury and low food intake while you were in the hospital. We monitored your sodium closely and gave you IV fluids and a medicine called UreNa to return your sodium to normal.    Diagnosis: WINSTON (acute kidney injury)  Assessment and Plan of Treatment: You had an acute kidney injury because of the contrast you received for your imaging. With time and good hydration, your kidneys recovered. However, you still have residual chronic kidney disease likely from long standing high blood pressure. Please take your blood pressure medication and follow up with your primary care doctor. PRINCIPAL DISCHARGE DIAGNOSIS  Diagnosis: NSTEMI (non-ST elevated myocardial infarction)  Assessment and Plan of Treatment: You had a non-ST elevation myocardial infarction. This was a medical emergency and there was no alternative but for you to come to the hospital and remain hospitalized until discharge. This means that parts of your heart were not receiving enough blood to function. This makes your heart weaker. You were found to have an ejection fraction of 37%. You also have three of your coronary arteries obstructed. You were evaluated for surgery and catheterization to resolve these obstructions, but it was determined that the cons outweighed the pros. You received a heparin drip and other medication to medically treat your NSTEMI. You were optimized your heart medications and found to be medically stable. Please take all of your medications and follow up with cardiologists as an outpatient.      SECONDARY DISCHARGE DIAGNOSES  Diagnosis: Hyponatremia  Assessment and Plan of Treatment: You had low sodium in your blood. This was likely due to poor urine output from your kidney injury and low food intake while you were in the hospital. We monitored your sodium closely and gave you IV fluids and a medicine called UreNa to return your sodium to normal.    Diagnosis: WINSTON (acute kidney injury)  Assessment and Plan of Treatment: You had an acute kidney injury because of the contrast you received for your imaging. With time and good hydration, your kidneys recovered. However, you still have residual chronic kidney disease likely from long standing high blood pressure. Please take your blood pressure medication and follow up with your primary care doctor. PRINCIPAL DISCHARGE DIAGNOSIS  Diagnosis: NSTEMI (non-ST elevated myocardial infarction)  Assessment and Plan of Treatment: You had a non-ST elevation myocardial infarction. This was a medical emergency and there was no alternative but for you to come to the hospital and remain hospitalized until discharge. This means that parts of your heart were not receiving enough blood to function. This makes your heart weaker. You were found to have an ejection fraction of 37%. You also have three of your coronary arteries obstructed. You were evaluated for surgery and catheterization to resolve these obstructions, but it was determined that the cons outweighed the pros. You received a heparin drip and other medication to medically treat your NSTEMI. You were optimized your heart medications and found to be medically stable. Please take all of your medications and follow up with cardiologists as an outpatient.  Stop taking:  Amlodipine  Start taking:  Atorvastatin 80 mg daily  ASA 81 mg daily  Plavix 75 mg daily  Metoprolol ER 75 mg daily  Hydralazine 25 mg TID      SECONDARY DISCHARGE DIAGNOSES  Diagnosis: Hyponatremia  Assessment and Plan of Treatment: You had low sodium in your blood. This was likely due to poor urine output from your kidney injury and low food intake while you were in the hospital. We monitored your sodium closely and gave you IV fluids and a medicine called UreNa to return your sodium to normal.    Diagnosis: WINSTON (acute kidney injury)  Assessment and Plan of Treatment: You had an acute kidney injury because of the contrast you received for your imaging. With time and good hydration, your kidneys recovered. However, you still have residual chronic kidney disease likely from long standing high blood pressure. Please take your blood pressure medication and follow up with your primary care doctor.

## 2019-10-01 NOTE — PROGRESS NOTE ADULT - ATTENDING COMMENTS
I have seen this patient with the fellow and agree with their assessment and plan. In addition,    We obtained history via the  line.     # WINSTON - multifactorial. Contrast induced nephropathy + hemodynamic ATN. Serum creatinine improving to 2.11 today.     # hypotonic, hyponatremia - Urine osm <300. Etiology of hyponatremia is multifactorial. She likely has low water clearance from WINSTON, and she typically takes a low solute diet. Serum sodium drop to 126 today. We had started UreNa 15mg BID yesterday evening, but she is not really drinking it (noticed a full cup at the bedside). We will start 1.5% hypertonic saline at 50ml/hour. To repeat serum sodium Q6H for now.     # Hypertension - Patient was hypotensive previously. Now BP improved. OK to resume hydralazine dose of 37.5mg tid.     # CKD - baseline serum creatinine 1.8. Etiology of CKD unclear. UA bland. Probably secondary to microvascular disease/ hypertension.     # Medication toxicity Monitoring: medication dose reviewed. Please dose medications to eGFR<15    The rest of the recommendations as per fellow's note.    Brielle Umanzor MD  Attending Nephrologist  [phone]

## 2019-10-01 NOTE — PROGRESS NOTE ADULT - ASSESSMENT
84 YO F with a history of ACC/AHA Stage C ischemic cardiomyopathy, HTN, and CKD III (baseline Cr 1.8) who presented with NSTEMI and found to have 3v CAD for which heart failure cardiology consulted for further management. She is mildly overloaded and does not have reduced cardiac output on invasive hemodynamics despite very high SVR at time of assessment. She has room for afterload reduction and no prior HF hospitalizations. She has myocardial viability but not deemed to be a suitable candidate for surgical revascularization due to risk or percutaneous revascularization due to anatomy.     Review of pertinent studies reveals:  NM viability: gated EF 37%, mostly viable myocardium aside from distal anteroseptal/anterior/inferolateral/apical walls which have minimal viability     TTE 9/24/19: very limited images (only contrast enhanced images helpful), LV non-dilated, EF ~30%, akinesis of inferolateral wall and apex    LHC 9/24/19: severe diffuse mid LAD stenosis, OM2 subtotally occluded, ramus occluded, RCA occluded    RHC 9/24/19: RA 10, PA 48/23 (34), PCWP 29, Sherry CO/CI 3.7/2.2,  with SVR 1990      The plan for today is as follows:  - restart lasix at 40 mg daily due to signs of volume retention   - continue hydralizine 25 mg TID and metoprolol succinate 50 mg daily. avoiding aggressive uptitration and RAAS inhibitor with WINSTON  - viability study showing mostly viable myocardium, per Dr. Wick and cardiac surgery she is not a good candidate for either form of revascularization so will continue medical management   - continue ASA/statin  - continue plavix for treatment of NSTEMI  - TIA and LEAFS for claudication symptoms 84 YO F with a history of ACC/AHA Stage C ischemic cardiomyopathy, HTN, and CKD III (baseline Cr 1.8) who presented with NSTEMI and found to have 3v CAD for which heart failure cardiology consulted for further management. She is mildly overloaded and does not have reduced cardiac output on invasive hemodynamics despite very high SVR at time of assessment. She has room for afterload reduction and no prior HF hospitalizations. She has myocardial viability but not deemed to be a suitable candidate for surgical revascularization due to risk or percutaneous revascularization due to anatomy.     Review of pertinent studies reveals:  NM viability: gated EF 37%, mostly viable myocardium aside from distal anteroseptal/anterior/inferolateral/apical walls which have minimal viability     TTE 9/24/19: very limited images (only contrast enhanced images helpful), LV non-dilated, EF ~30%, akinesis of inferolateral wall and apex    LHC 9/24/19: severe diffuse mid LAD stenosis, OM2 subtotally occluded, ramus occluded, RCA occluded    RHC 9/24/19: RA 10, PA 48/23 (34), PCWP 29, Sherry CO/CI 3.7/2.2,  with SVR 1990      The plan for today is as follows:  - restart lasix at 40 mg daily due to signs of volume retention   - continue hydralizine 25 mg TID and metoprolol succinate 50 mg daily. avoiding aggressive uptitration and RAAS inhibitor with WINSTON  - viability study showing mostly viable myocardium, per Dr. Wick and cardiac surgery she is not a good candidate for either form of revascularization so will continue medical management   - continue ASA/statin  - continue plavix for treatment of NSTEMI  - TIA and LEAFS for claudication symptoms   - nephrology following re: hyponatremia 84 YO F with a history of ACC/AHA Stage C ischemic cardiomyopathy, HTN, and CKD III (baseline Cr 1.8) who presented with NSTEMI and found to have 3v CAD for which heart failure cardiology consulted for further management. She is mildly overloaded and does not have reduced cardiac output on invasive hemodynamics despite very high SVR at time of assessment. She has room for afterload reduction and no prior HF hospitalizations. She has myocardial viability but not deemed to be a suitable candidate for surgical revascularization due to risk or percutaneous revascularization due to anatomy.     Review of pertinent studies reveals:  NM viability: gated EF 37%, mostly viable myocardium aside from distal anteroseptal/anterior/inferolateral/apical walls which have minimal viability     TTE 9/24/19: very limited images (only contrast enhanced images helpful), LV non-dilated, EF ~30%, akinesis of inferolateral wall and apex    LHC 9/24/19: severe diffuse mid LAD stenosis, OM2 subtotally occluded, ramus occluded, RCA occluded    RHC 9/24/19: RA 10, PA 48/23 (34), PCWP 29, Sherry CO/CI 3.7/2.2,  with SVR 1990      The plan for today is as follows:  - restart lasix at 40 mg daily due to signs of volume retention   - continue hydralizine 25 mg TID and increase metoprolol succinate to 75 mg daily. avoid RAAS inhibitor with WINSTON  - viability study showing mostly viable myocardium, per Dr. Wick and cardiac surgery she is not a good candidate for either form of revascularization so will continue medical management   - continue ASA/statin  - continue plavix for treatment of NSTEMI  - TIA and LEAFS for claudication symptoms   - nephrology following re: hyponatremia   - would walk her again tomorrow to ensure she does not have angina, otherwise can plan for d/c planning

## 2019-10-01 NOTE — PROGRESS NOTE ADULT - SUBJECTIVE AND OBJECTIVE BOX
[facility] Division of Kidney Diseases & Hypertension  FOLLOW UP NOTE  [phone]--------------------------------------------------------------------------------  Chief Complaint:Non-ST elevation myocardial infarction (NSTEMI)      24 hour events/subjective:  Patient seen this morning in bed, laying comfortably without any signs of distress. Her current sodium level has been stable with recent na at 130. Her serum creatinine has been downtrending. She denies any subjective complaints of chest pain, shortness of breath, no nausea/vomiting. Currently hemodynamically stable. No febrile episodes overnight.       PAST HISTORY  --------------------------------------------------------------------------------  No significant changes to PMH, PSH, FHx, SHx, unless otherwise noted    ALLERGIES & MEDICATIONS  --------------------------------------------------------------------------------  Allergies    No Known Allergies    Intolerances      Standing Inpatient Medications  aspirin enteric coated 81 milliGRAM(s) Oral daily  atorvastatin 80 milliGRAM(s) Oral at bedtime  clopidogrel Tablet 75 milliGRAM(s) Oral daily  docusate sodium 100 milliGRAM(s) Oral daily  heparin  Injectable 5000 Unit(s) SubCutaneous two times a day  hydrALAZINE 25 milliGRAM(s) Oral three times a day  metoprolol succinate ER 50 milliGRAM(s) Oral daily  senna 2 Tablet(s) Oral at bedtime  Ure-Na 15 Gram(s) 15 Gram(s) Oral two times a day    PRN Inpatient Medications  acetaminophen   Tablet .. 650 milliGRAM(s) Oral every 6 hours PRN      REVIEW OF SYSTEMS  --------------------------------------------------------------------------------  Gen: No  fevers  Skin: No rashes  Respiratory: No dyspnea  CV: No chest pain  GI: No abdominal pain, diarrhea, nausea, vomiting  MSK: No joint pain/swelling  Neuro: No dizziness/lightheadedness, weakness    All other systems were reviewed and are negative, except as noted.    VITALS/PHYSICAL EXAM  --------------------------------------------------------------------------------  T(C): 36.9 (10-01-19 @ 04:37), Max: 36.9 (10-01-19 @ 04:37)  HR: 100 (10-01-19 @ 04:37) (81 - 101)  BP: 117/70 (10-01-19 @ 04:37) (109/69 - 128/76)  RR: 18 (10-01-19 @ 04:37) (16 - 20)  SpO2: 98% (10-01-19 @ 04:37) (95% - 98%)  Wt(kg): --        09-30-19 @ 07:01  -  10-01-19 @ 07:00  --------------------------------------------------------  IN: 600 mL / OUT: 1600 mL / NET: -1000 mL      Physical Exam:  	Gen: NAD, well-appearing  	HEENT: supple, no JVD  	Pulm: minimal bibasal rales  	CV:  S1S2  	Abd: +BS, soft   	MSK: No B/L Lower ext edema  	Neuro: No focal deficits  	Skin: Warm, without rashes  	Vascular: No cyanosis    LABS/STUDIES  --------------------------------------------------------------------------------              13.8   12.0  >-----------<  220      [09-30-19 @ 05:02]              42.7     130  |  95  |  41  ----------------------------<  162      [09-30-19 @ 13:16]  4.2   |  23  |  2.11        Ca     8.7     [09-30-19 @ 13:16]      Mg     2.8     [09-30-19 @ 05:02]      Phos  3.3     [09-30-19 @ 05:02]    TPro  7.7  /  Alb  4.4  /  TBili  0.8  /  DBili  x   /  AST  45  /  ALT  51  /  AlkPhos  106  [09-30-19 @ 05:02]          Creatinine Trend:  SCr 2.11 [09-30 @ 13:16]  SCr 2.22 [09-30 @ 05:02]  SCr 2.28 [09-29 @ 20:48]  SCr 2.34 [09-29 @ 13:50]  SCr 2.58 [09-29 @ 05:28]    Urinalysis - [09-29-19 @ 01:33]      Color Light Yellow / Appearance Clear / SG 1.009 / pH 7.0      Gluc Trace / Ketone Negative  / Bili Negative / Urobili <2 mg/dL       Blood Negative / Protein Trace / Leuk Est Negative / Nitrite Negative      RBC  / WBC  / Hyaline  / Gran  / Sq Epi  / Non Sq Epi  / Bacteria     Urine Creatinine 16      [09-25-19 @ 01:54]  Urine Sodium 33      [09-29-19 @ 05:33]  Urine Urea Nitrogen 175      [09-25-19 @ 07:54]  Urine Chloride 84      [09-25-19 @ 01:54]  Urine Osmolality 218      [09-29-19 @ 09:27]    HbA1c 6.1      [09-25-19 @ 09:57]  TSH 0.19      [09-27-19 @ 13:35]  Lipid: chol 214, , HDL 78,       [09-25-19 @ 08:44] [facility] Division of Kidney Diseases & Hypertension  FOLLOW UP NOTE  [phone]--------------------------------------------------------------------------------  Chief Complaint:Non-ST elevation myocardial infarction (NSTEMI)      24 hour events/subjective:  Patient seen this morning in bed, laying comfortably without any signs of distress. She complaints of having unpleasant taste which causes her to have poor appetite.  Her serum creatinine has been downtrending to 2.09 today however her current sodium level has decreased to 123 today from yesterday. She denies any subjective complaints of chest pain, shortness of breath, no nausea/vomiting. Currently hemodynamically stable. No febrile episodes overnight.       PAST HISTORY  --------------------------------------------------------------------------------  No significant changes to PMH, PSH, FHx, SHx, unless otherwise noted    ALLERGIES & MEDICATIONS  --------------------------------------------------------------------------------  Allergies    No Known Allergies    Intolerances      Standing Inpatient Medications  aspirin enteric coated 81 milliGRAM(s) Oral daily  atorvastatin 80 milliGRAM(s) Oral at bedtime  clopidogrel Tablet 75 milliGRAM(s) Oral daily  docusate sodium 100 milliGRAM(s) Oral daily  heparin  Injectable 5000 Unit(s) SubCutaneous two times a day  hydrALAZINE 25 milliGRAM(s) Oral three times a day  metoprolol succinate ER 50 milliGRAM(s) Oral daily  senna 2 Tablet(s) Oral at bedtime  Ure-Na 15 Gram(s) 15 Gram(s) Oral two times a day    PRN Inpatient Medications  acetaminophen   Tablet .. 650 milliGRAM(s) Oral every 6 hours PRN      REVIEW OF SYSTEMS  --------------------------------------------------------------------------------  Gen: No  fevers  Skin: No rashes  Respiratory: No dyspnea  CV: No chest pain  GI: No abdominal pain, diarrhea, nausea, vomiting  MSK: No joint pain/swelling  Neuro: No dizziness/lightheadedness, weakness    All other systems were reviewed and are negative, except as noted.    VITALS/PHYSICAL EXAM  --------------------------------------------------------------------------------  T(C): 36.9 (10-01-19 @ 04:37), Max: 36.9 (10-01-19 @ 04:37)  HR: 100 (10-01-19 @ 04:37) (81 - 101)  BP: 117/70 (10-01-19 @ 04:37) (109/69 - 128/76)  RR: 18 (10-01-19 @ 04:37) (16 - 20)  SpO2: 98% (10-01-19 @ 04:37) (95% - 98%)  Wt(kg): --        09-30-19 @ 07:01  -  10-01-19 @ 07:00  --------------------------------------------------------  IN: 600 mL / OUT: 1600 mL / NET: -1000 mL      Physical Exam:  	Gen: NAD, well-appearing  	HEENT: supple, no JVD  	Pulm: minimal bibasal rales  	CV:  S1S2  	Abd: +BS, soft   	MSK: No B/L Lower ext edema  	Neuro: No focal deficits  	Skin: Warm, without rashes  	Vascular: No cyanosis    LABS/STUDIES  --------------------------------------------------------------------------------              13.8   12.0  >-----------<  220      [09-30-19 @ 05:02]              42.7     130  |  95  |  41  ----------------------------<  162      [09-30-19 @ 13:16]  4.2   |  23  |  2.11        Ca     8.7     [09-30-19 @ 13:16]      Mg     2.8     [09-30-19 @ 05:02]      Phos  3.3     [09-30-19 @ 05:02]    TPro  7.7  /  Alb  4.4  /  TBili  0.8  /  DBili  x   /  AST  45  /  ALT  51  /  AlkPhos  106  [09-30-19 @ 05:02]          Creatinine Trend:  SCr 2.11 [09-30 @ 13:16]  SCr 2.22 [09-30 @ 05:02]  SCr 2.28 [09-29 @ 20:48]  SCr 2.34 [09-29 @ 13:50]  SCr 2.58 [09-29 @ 05:28]    Urinalysis - [09-29-19 @ 01:33]      Color Light Yellow / Appearance Clear / SG 1.009 / pH 7.0      Gluc Trace / Ketone Negative  / Bili Negative / Urobili <2 mg/dL       Blood Negative / Protein Trace / Leuk Est Negative / Nitrite Negative      RBC  / WBC  / Hyaline  / Gran  / Sq Epi  / Non Sq Epi  / Bacteria     Urine Creatinine 16      [09-25-19 @ 01:54]  Urine Sodium 33      [09-29-19 @ 05:33]  Urine Urea Nitrogen 175      [09-25-19 @ 07:54]  Urine Chloride 84      [09-25-19 @ 01:54]  Urine Osmolality 218      [09-29-19 @ 09:27]    HbA1c 6.1      [09-25-19 @ 09:57]  TSH 0.19      [09-27-19 @ 13:35]  Lipid: chol 214, , HDL 78,       [09-25-19 @ 08:44]

## 2019-10-01 NOTE — DISCHARGE NOTE PROVIDER - CARE PROVIDER_API CALL
Billy Wick (MD)  Cardiovascular Disease; Interventional Cardiology  [address] [place] [address]  Phone: [phone]  Fax: [phone]  Follow Up Time:

## 2019-10-01 NOTE — PROGRESS NOTE ADULT - PROBLEM SELECTOR PLAN 1
Patient with WINSTON on CKD in the setting of MARLENA from recent cardiac catheterization. Patient with CKD likely due to uncontrolled HTN as patient had creatinine ~1.8 in 1/2018. Patient has bland urine sediment and U/S of the kidneys showing multiple renal cysts. Her creatinine has now been resolving. Will continue to follow for now. Avoid other nephrotoxic agents, avoid hypotensive episodes and renally dose all medications.

## 2019-10-01 NOTE — PROGRESS NOTE ADULT - ASSESSMENT
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

## 2019-10-01 NOTE — CDI QUERY NOTE - NSCDIOTHERTXTBX_GEN_ALL_CORE_HH
Good morning,    Patient transferred form OSH with NSTEMI and Acute on Chronic Systolic Heart Failure for cardiac catheterization.  On admission, , RR 24, WBC 16.2K, serum creatinine 1.93 with a documented baseline of 1.8.    On 9/26 patient's serum creatinine 2.59, HR 99, WBC 16.2K.  Patient documented with WINSTON likely 2/2 MARLENA.  Patient underwent Cardiac Catherization on this admission.    Nephrology evaluated patient and documented   " #WINSTON - secondary to contrast-induced nephropathy + hemodynamic ATN. Serum creatinine improving gradually."    Patient documented with "Hyponatremia" on this admission.  On admission, serum sodium was 134 down to 123 on 9/28.  Please link if appropriate, a medical diagnosis associated with the above clinical indicators -    (1) Patient with non-infectious SIRS likely 2/2 NSTEMI, resolved/resolving  (2) Patient with non-infectious SIRS likely 2/2 Hyponatremia resolved/resolving  (3) Other (please specify)  (4) Clinically Undetermined    Thank you for your help,    Radha

## 2019-10-01 NOTE — PROGRESS NOTE ADULT - PROBLEM SELECTOR PLAN 2
Euvolemic hyponatremia. Possibly due to recent contrast.  -Renal recs appreciated.  -UreNa 15 G bid  - Daily BMP Euvolemic hyponatremia. Possibly due to recent contrast.  -Renal recs appreciated.  -1.5% NS at 50 ml/hr  -Q6h BMP  -UreNa 15 G bid, ensure that she is taking

## 2019-10-01 NOTE — PROGRESS NOTE ADULT - SUBJECTIVE AND OBJECTIVE BOX
INTERVAL EVENTS:  Overnight, complained of diffuse pain in her arms, back, thighs, chest, and back. Improved with tylenol.    SUBJECTIVE:  She denies chest pain or shortness of breath at this time.  She attributes her discomfort to being cold.    ROS:  General - No fevers or chills  Cards - No chest pain or palpitations  Pulm - No cough or shortness of breath  GI - No abdominal pain.   - No dysuria or hematuria    OBJECTIVE:  Vitals Last 24 hrs  Vital Signs Last 24 Hrs  T(C): 36.9 (01 Oct 2019 04:37), Max: 36.9 (01 Oct 2019 04:37)  T(F): 98.5 (01 Oct 2019 04:37), Max: 98.5 (01 Oct 2019 04:37)  HR: 100 (01 Oct 2019 04:37) (81 - 101)  BP: 117/70 (01 Oct 2019 04:37) (109/69 - 128/76)  BP(mean): --  RR: 18 (01 Oct 2019 04:37) (16 - 20)  SpO2: 98% (01 Oct 2019 04:37) (95% - 98%)    IOs  I&O's Summary    30 Sep 2019 07:01  -  01 Oct 2019 07:00  --------------------------------------------------------  IN: 600 mL / OUT: 1600 mL / NET: -1000 mL        PE:  General - No acute distress. In bed, resting comfortably.  Heart - Normal S1 and S2. No murmurs auscultated.  Lungs - Clear to auscultation bilaterally  GI - Abdomen soft, NT, ND.  Extremities - No lower extremity edema. Peripheral pulses intact. Calves nontender.  Skin - No rashes on extremities    LABS:  ****************************      IMAGING:  No new imaging    Tele: Sinus 90s-110s. 1st degree heart block. INTERVAL EVENTS:  Overnight, complained of diffuse pain in her arms, back, thighs, chest, and back. Improved with tylenol.    SUBJECTIVE:  [place] : [id number]  She denies chest pain or shortness of breath at this time.  She attributes her discomfort to being cold.    ROS:  General - No fevers or chills  Cards - No chest pain or palpitations  Pulm - No cough or shortness of breath  GI - No abdominal pain.   - No dysuria or hematuria    OBJECTIVE:  Vitals Last 24 hrs  Vital Signs Last 24 Hrs  T(C): 36.9 (01 Oct 2019 04:37), Max: 36.9 (01 Oct 2019 04:37)  T(F): 98.5 (01 Oct 2019 04:37), Max: 98.5 (01 Oct 2019 04:37)  HR: 100 (01 Oct 2019 04:37) (81 - 101)  BP: 117/70 (01 Oct 2019 04:37) (109/69 - 128/76)  BP(mean): --  RR: 18 (01 Oct 2019 04:37) (16 - 20)  SpO2: 98% (01 Oct 2019 04:37) (95% - 98%)    IOs  I&O's Summary    30 Sep 2019 07:01  -  01 Oct 2019 07:00  --------------------------------------------------------  IN: 600 mL / OUT: 1600 mL / NET: -1000 mL        PE:  General - No acute distress. In bed, resting comfortably.  Heart - Normal S1 and S2. No murmurs auscultated.  Lungs - Clear to auscultation bilaterally  GI - Abdomen soft, NT, ND.  Extremities - No lower extremity edema. Peripheral pulses intact. Calves nontender.  Skin - No rashes on extremities    LABS:  ****************************      IMAGING:  No new imaging    Tele: Sinus 90s-110s. 1st degree heart block. INTERVAL EVENTS:  Overnight, complained of diffuse pain in her arms, back, thighs, chest, and back. Improved with tylenol.    SUBJECTIVE:  [place] : [id number]  She denies chest pain or shortness of breath at this time.  She attributes her discomfort to being cold.    ROS:  General - No fevers or chills  Cards - No chest pain or palpitations  Pulm - No cough or shortness of breath  GI - No abdominal pain.   - No dysuria or hematuria    OBJECTIVE:  Vitals Last 24 hrs  Vital Signs Last 24 Hrs  T(C): 36.9 (01 Oct 2019 04:37), Max: 36.9 (01 Oct 2019 04:37)  T(F): 98.5 (01 Oct 2019 04:37), Max: 98.5 (01 Oct 2019 04:37)  HR: 100 (01 Oct 2019 04:37) (81 - 101)  BP: 117/70 (01 Oct 2019 04:37) (109/69 - 128/76)  BP(mean): --  RR: 18 (01 Oct 2019 04:37) (16 - 20)  SpO2: 98% (01 Oct 2019 04:37) (95% - 98%)    IOs  I&O's Summary    30 Sep 2019 07:01  -  01 Oct 2019 07:00  --------------------------------------------------------  IN: 600 mL / OUT: 1600 mL / NET: -1000 mL        PE:  General - No acute distress. In bed, resting comfortably.  Heart - Normal S1 and S2. No murmurs auscultated.  Lungs - Clear to auscultation bilaterally  GI - Abdomen soft, NT, ND.  Extremities - No lower extremity edema. Peripheral pulses intact. Calves nontender.  Skin - No rashes on extremities    LABS:                        11.7   10.53 )-----------( 216      ( 01 Oct 2019 09:17 )             36.2   10-01    123<L>  |  90<L>  |  51<H>  ----------------------------<  118<H>  4.1   |  23  |  2.09<H>    Ca    8.9      01 Oct 2019 07:31  Phos  3.1     10-01  Mg     2.4     10-01    TPro  6.9  /  Alb  3.6  /  TBili  0.7  /  DBili  x   /  AST  35  /  ALT  42  /  AlkPhos  97  10-01        IMAGING:  No new imaging    Tele: Sinus 90s-110s. 1st degree heart block.

## 2019-10-01 NOTE — PHARMACOTHERAPY INTERVENTION NOTE - COMMENTS
Confirmed home medications with patient and pharmacy, updated in Outpatient Medication Review. No discrepancies from inpatient medications.    Mellissa Walters, PharmD  PGY-1 Pharmacy Resident  [phone]

## 2019-10-01 NOTE — PROGRESS NOTE ADULT - PROBLEM SELECTOR PLAN 2
Patient with euvolemic/hypervolemic hyponatremia.Her current serum sodium has been stable at 130~. Continue free water restriction for now to 1L and  UreNa 15 mg BID to help excrete electrolyte-free water. Continue to monitor sodium q4-6h. Patient current with euvolemic hyponatremia in addition to poor PO/ osmolar intake. Her current serum sodium today has decreased to 123.  Continue free water restriction for now to 1L and UreNa 15 mg BID to help excrete electrolyte-free water. Start 1.5% NS at 50 ml/hr. Continue to monitor sodium q4-6h. Repeat Urine electrolytes and urine osmolarity.

## 2019-10-01 NOTE — PROVIDER CONTACT NOTE (OTHER) - BACKGROUND
Pt admitted for dyspnea, dx with TVD, medical management. Pt known to complain of pain on and off and then deny symptoms after.

## 2019-10-01 NOTE — PROGRESS NOTE ADULT - PROBLEM SELECTOR PLAN 1
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 Seymour) and cardiology (Dr. Wick) consulted.  - Cardiac viability shows moderate residual viability. Interventional cardiology and CTS are not offering intervention. Cardiology may consider PCI after trying medical optimization.  - ASA and plavix  - Atorvastatin 80 mg q Day  - Metoprolol tartrate 25 mg po BID; converted to metoprolol ER 50mg daily.   -Abnormal TFT's possibly due to euthyroid sick syndrome.   -C/w telemetry.  - Hydralazine titrated to 25mg TID down from 37.5 in setting of WINSTON. 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 Seymour) and cardiology (Dr. Wick) consulted.  - Cardiac viability shows moderate residual viability. Interventional cardiology and CTS are not offering intervention. Cardiology may consider PCI after trying medical optimization.  - ASA and plavix  - Atorvastatin 80 mg q Day  - Metoprolol tartrate 25 mg po BID; converted to metoprolol ER 50mg daily.   -Abnormal TFT's possibly due to euthyroid sick syndrome.   -C/w telemetry.  -Increasing hydralazine to 37.5 mg TID since Cr improved. 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 Seymour) and cardiology (Dr. Wick) consulted.  - Cardiac viability shows moderate residual viability. Interventional cardiology and CTS are not offering intervention. Cardiology may consider PCI after trying medical optimization.  - ASA and plavix  - Atorvastatin 80 mg q Day  - Metoprolol tartrate 25 mg po BID; converted to metoprolol ER 50mg daily.   -Abnormal TFT's possibly due to euthyroid sick syndrome.   -C/w telemetry.  -Increasing hydralazine to 35 mg TID since Cr improved.

## 2019-10-01 NOTE — DISCHARGE NOTE PROVIDER - NSDCCPTREATMENT_GEN_ALL_CORE_FT
PRINCIPAL PROCEDURE  Procedure: Complete cardiac catheterization  Findings and Treatment: Below are the report on your coronary vessels.  VENTRICLES: No left ventriculogram was performed.  CORONARY VESSELS: The coronary circulation is right dominant.  LM:   --  LM: Normal.  LAD:   --  Mid LAD: There was a diffuse 99 % stenosis.  CX:   --  OM2: There was a 99 % stenosis.  RI:   --  Proximal ramus intermedius: There was a 100 % stenosis.  RCA:   --  Proximal RCA: There was a 100 % stenosis.        SECONDARY PROCEDURE  Procedure: Transthoracic echocardiography (TTE)  Findings and Treatment: You underwent a TTE. Here is the report:  Observations:  Mitral Valve: Normal mitral valve. Minimal mitral  regurgitation.  Aortic Valve/Aorta: Calcified aortic valve with normal  opening.  Mild aortic regurgitation.  Normal aortic root size.  Left Atrium: Normal left atrium.  Left Ventricle: Endocardial visualization enhanced with  intravenous injection of Ultrasonic Enhancing Agent  (Definity).  Severely reduced left ventricular systolic function.  Akinesis of the inferolateral wall. Large area of apical  akinesis.  Right Heart: Right atrium not well visualized. Normal right  ventricular size and function. Normal tricuspid valve.  Minimal tricuspid regurgitation. Normal pulmonic valve.  Mild pulmonic regurgitation.  Pericardium/Pleura: Normal pericardium with no pericardial  effusion.  Hemodynamic: Estimated right atrial pressure is normal.  No evidence of pulmonary hypertension.  ------------------------------------------------------------------------  Conclusions:  Endocardial visualization enhanced with intravenous  injection of Ultrasonic Enhancing Agent (Definity).  Severely reduced left ventricular systolic function.  Akinesis of the inferolateral wall. Large area of apical  akinesis.    Procedure: Positron emission tomography-computed tomography of heart for cardiac viability using fluorodeoxyglucose  Findings and Treatment: You underwent a cardiac viaiblity study. Here is the report:  IMPRESSIONS:Abnormal Study  * The left ventricle was normal in size.  * The anterolateral, basal anteroseptal, basal anterior,  and inferoseptal walls demonstrate mostly preserved  perfusion on rest imaging suggestive of viable myocardium.  * There are moderate defects in the mid anterior, mid  anteroseptal, inferior, and basal to mid inferolateral  walls that demonstrate improved perfusion in some segments  on delay imaging suggestive of moderate residual  viability.  * There are severe defects in the distal anteroseptal,  distal anterior, distal inferolateral, and apical walls  that are mostly fixed on delay imaging suggestive of  infarct with minimal residual viability.  * Rest gated wall motion analysis was performed (LVEF = 37  %;LVEDV = 69 ml.) revealing severe hypokinesis of the  distal anterior, distal anteroseptal, distal  inferolateral, and apical walls and mild to moderate  hypokinesis of the remaining segments.  *** No previous Nuclear/Stress exam.

## 2019-10-01 NOTE — PROGRESS NOTE ADULT - PROBLEM SELECTOR PLAN 4
Unclear baseline, suspect combination of hemodynamic WINSTON as well as possible MARLENA in setting of some likely underlying CKD.   - Continue to trend  - Renal US showed no hydronephrosis and only medical renal disease.   - Appreciate renal - likely due to contrast.  - Lasix 40 mg daily PO for hyponatremia likely due to poor urine output, will continue to monitor. Unclear baseline, suspect combination of hemodynamic WINSTON as well as possible MARLENA in setting of some likely underlying CKD.   - Continue to trend  - Renal US showed no hydronephrosis and only medical renal disease.   - Appreciate renal - likely due to contrast.

## 2019-10-01 NOTE — PROGRESS NOTE ADULT - PROBLEM SELECTOR PLAN 3
Euvolemic, no crackles, RA  - Daily standing weights and strict I's/O's.  - Hydralazine 25 TID  - Metoprolol 50 mg ER  - Continue to monitor for fluid overload. Euvolemic, no crackles, RA  - Daily standing weights and strict I's/O's.  - Hydralazine 25 TID  - Metoprolol 50 mg ER  - Continue to monitor for fluid overload. Holding lasix at this time. Euvolemic, no crackles, RA  - Daily standing weights and strict I's/O's.  - Hydralazine 35 TID  - Metoprolol 50 mg ER  - Continue to monitor for fluid overload. Holding lasix at this time.

## 2019-10-01 NOTE — DISCHARGE NOTE PROVIDER - HOSPITAL COURSE
Pt is an 82 yo Samoan female (visiting family, sumanth speaking) who presented to the ED at [facility] on 9/22 complaining of exertional chest pain, dyspnea, orthopnea, and cough for three weeks. At [facility], she was noted to have a CT Chest negative for PE, ekg without any changes, and troponins that peaked at 1.38. She was transferred to [facility] for further management of NSTEMI and acute systolic HF for a right and left heart catheterization.         At [facility], catheterization was deferred due to volume overloaded status and orthopnea. Patient was diuresed on Lasix 40 mg IV and Lasix 20 IV x1. Patient's orthopnea improved substantially and patient was able to have a left and right heart catheterization. 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 CT Surgery was consulted for further management, who offered patient a CABG. However, per goals of care, family is undecided about open heart surgery. Interventional cardiology was consulted.  NM viability: gated EF 37%, mostly viable myocardium aside from distal anteroseptal/anterior/inferolateral/apical walls which have minimal viability. CT surgery judged that she would not benefit from CABG. Cardiology deferred to the advanced heart failure team for medical optimization rather than PCI, because the risks outweighed the benefits. Pt is an 82 yo Maldivian female (visiting family, sumanth speaking) who presented to the ED at [facility] on 9/22 complaining of exertional chest pain, dyspnea, orthopnea, and cough for three weeks. At [facility], she was noted to have a CT Chest negative for PE, ekg without any changes, and troponins that peaked at 1.38. She was transferred to [facility] for further management of NSTEMI and acute systolic HF for a right and left heart catheterization.         At [facility], catheterization was deferred due to volume overloaded status and orthopnea. Patient was diuresed on Lasix 40 mg IV and Lasix 20 IV x1. Patient's orthopnea improved substantially and patient was able to have a left and right heart catheterization. 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 CT Surgery was consulted for further management, who offered patient a CABG. However, per goals of care, family is undecided about open heart surgery. Interventional cardiology was consulted.  NM viability: gated EF 37%, mostly viable myocardium aside from distal anteroseptal/anterior/inferolateral/apical walls which have minimal viability. CT surgery judged that she would not benefit from CABG. Cardiology deferred to the advanced heart failure team for medical optimization rather than PCI, because the risks outweighed the benefits. Her hyponatremia was thought to be due to poor solute intake. Ms. Santana was medically optimized on hydralazine 25 mg TID, metoprolol succinate 75 mg daily, asa 81 daily, plavix 75, and atorvastatin 80 mg daily mg. She was seen and examined on 10/3/2019 and determined to be medically ready for discharge. Pt is an 82 yo Romanian female (visiting family, sumanth speaking) who presented to the ED at [facility] on 9/22 complaining of exertional chest pain, dyspnea, orthopnea, and cough for three weeks. At [facility], she was noted to have a CT Chest negative for PE, ekg without any changes, and troponins that peaked at 1.38. She was transferred to [facility] for further management of NSTEMI and acute systolic HF for a right and left heart catheterization.         At [facility], catheterization was deferred due to volume overloaded status and orthopnea. Patient was diuresed on Lasix 40 mg IV and Lasix 20 IV x1. Patient's orthopnea improved substantially and patient was able to have a left and right heart catheterization. 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 CT Surgery was consulted for further management, who offered patient a CABG. However, per goals of care, family is undecided about open heart surgery. Interventional cardiology was consulted.  NM viability: gated EF 37%, mostly viable myocardium aside from distal anteroseptal/anterior/inferolateral/apical walls which have minimal viability. CT surgery judged that she would not benefit from CABG. Cardiology deferred to the advanced heart failure team for medical optimization rather than PCI, because the risks outweighed the benefits. Her hyponatremia was thought to be due to poor solute intake. Ms. Santana was medically optimized on hydralazine 25 mg TID, metoprolol succinate 75 mg daily, asa 81 daily, plavix 75, atorvastatin 80 mg daily mg, and lasix 20 mg PO daily. She was seen and examined on 10/4/2019 and determined to be medically ready for discharge. Pt is an 84 yo [place]arag[place] female (visiting family, sumanth speaking) who presented to the ED at [facility] on 9/22 complaining of exertional chest pain, dyspnea, orthopnea, and cough for three weeks. At [facility], she was noted to have a CT Chest negative for PE, ekg without any changes, and troponins that peaked at 1.38. She was transferred to [facility] for further management of NSTEMI and acute systolic HF for a right and left heart catheterization.         At [facility], catheterization was deferred due to volume overloaded status and orthopnea. Patient was diuresed on Lasix 40 mg IV and Lasix 20 IV x1. Patient's orthopnea improved substantially and patient was able to have a left and right heart catheterization. 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 CT Surgery was consulted for further management, who offered patient a CABG. However, per goals of care, family is undecided about open heart surgery. Interventional cardiology was consulted.  NM viability: gated EF 37%, mostly viable myocardium aside from distal anteroseptal/anterior/inferolateral/apical walls which have minimal viability. After further deliberation, CT surgery judged that she would not benefit from CABG. Cardiology deferred to the advanced heart failure team for medical optimization rather than PCI, because the risks outweighed the benefits. Her hyponatremia was thought to be due to poor solute intake and urine osmolality demonstrated that she was concentration urine off calderon. Ms. Santana was medically optimized on hydralazine 25 mg TID, metoprolol succinate 75 mg daily, asa 81 daily, plavix 75, atorvastatin 80 mg daily mg, and lasix 20 mg PO daily. She was seen and examined on 10/4/2019 and determined to be medically ready for discharge.

## 2019-10-01 NOTE — PROGRESS NOTE ADULT - PROBLEM SELECTOR PLAN 3
Patients current BP is acceptable. Continue current dose of Hydralazine and Metoprolol Patients current BP is acceptable. Continue current dose Metoprolol. Agree with increasing Hydralazine 35mg BID.

## 2019-10-01 NOTE — PROVIDER CONTACT NOTE (OTHER) - ASSESSMENT
Pt awake and asymptomatic, denies cp & palpitations. NP Denise aware. Pt pacing on telemonitor.
A&Ox4, complaining of generalized pain "everywhere" on her body. Vitals taken and WNL. No ectopy on tele. Denies SOB/chest pain. Endorses generalized aches everywhere. Pt recently OOB to bathroom about 2am, no complaints at that time. Team 4 resident at bedside to assess patient.
Pt A+Ox3, shows no signs of pain/discomfort.

## 2019-10-01 NOTE — PROVIDER CONTACT NOTE (OTHER) - SITUATION
Pt complaining of generalized pain in her arms, back, and upper body.  phone used to assess patient pain quality, pt repeating "pain everywhere".

## 2019-10-01 NOTE — PROGRESS NOTE ADULT - SUBJECTIVE AND OBJECTIVE BOX
Subjective:  Walked yesterday with PT, states she had leg pain but denied chest pain during ambulation. Denies complaints today aside from constipation. Laying flat without dyspnea.     Medications:  acetaminophen   Tablet .. 650 milliGRAM(s) Oral every 6 hours PRN  aspirin enteric coated 81 milliGRAM(s) Oral daily  atorvastatin 80 milliGRAM(s) Oral at bedtime  bisacodyl 5 milliGRAM(s) Oral every 12 hours PRN  clopidogrel Tablet 75 milliGRAM(s) Oral daily  docusate sodium 100 milliGRAM(s) Oral daily  heparin  Injectable 5000 Unit(s) SubCutaneous two times a day  hydrALAZINE 35 milliGRAM(s) Oral three times a day  metoprolol succinate ER 50 milliGRAM(s) Oral daily  senna 2 Tablet(s) Oral at bedtime  sodium chloride 1.5%. 500 milliLiter(s) IV Continuous <Continuous>  Ure-Na 15 Gram(s) 15 Gram(s) Oral two times a day    Vitals:  T(C): 36.9 (10-01-19 @ 12:45), Max: 36.9 (10-01-19 @ 04:37)  HR: 97 (10-01-19 @ 12:45) (90 - 101)  BP: 124/82 (10-01-19 @ 12:45) (117/70 - 128/76)  BP(mean): --  RR: 18 (10-01-19 @ 12:45) (16 - 20)  SpO2: 97% (10-01-19 @ 12:45) (95% - 98%)    Daily     Daily         I&O's Summary    30 Sep 2019 07:01  -  01 Oct 2019 07:00  --------------------------------------------------------  IN: 600 mL / OUT: 1600 mL / NET: -1000 mL        Physical Exam:  Appearance: No Acute Distress  HEENT: JVP 12 cm H2O, + HJR  Cardiovascular: RRR, Normal S1 S2, 1/6 WAYNE along sternal border  Respiratory: Clear to auscultation bilaterally  Gastrointestinal: Soft, Non-tender, non-distended	  Skin: no skin lesions  Neurologic: Non-focal  Extremities: No LE edema, warm and well perfused  Psychiatry: A & O x 3, Mood & affect appropriate      Labs:                        11.7   10.53 )-----------( 216      ( 01 Oct 2019 09:17 )             36.2     10-01    128<L>  |  92<L>  |  69<H>  ----------------------------<  114<H>  4.4   |  24  |  2.06<H>    Ca    9.0      01 Oct 2019 13:05  Phos  3.1     10-01  Mg     2.4     10-01    TPro  6.9  /  Alb  3.6  /  TBili  0.7  /  DBili  x   /  AST  35  /  ALT  42  /  AlkPhos  97  10-01            Serum Pro-Brain Natriuretic Peptide: 36356 pg/mL (09-25 @ 01:14)

## 2019-10-02 LAB
ANION GAP SERPL CALC-SCNC: 10 MMOL/L — SIGNIFICANT CHANGE UP (ref 5–17)
ANION GAP SERPL CALC-SCNC: 12 MMOL/L — SIGNIFICANT CHANGE UP (ref 5–17)
ANION GAP SERPL CALC-SCNC: 13 MMOL/L — SIGNIFICANT CHANGE UP (ref 5–17)
BUN SERPL-MCNC: 65 MG/DL — HIGH (ref 7–23)
BUN SERPL-MCNC: 82 MG/DL — HIGH (ref 7–23)
BUN SERPL-MCNC: 86 MG/DL — HIGH (ref 7–23)
CALCIUM SERPL-MCNC: 9 MG/DL — SIGNIFICANT CHANGE UP (ref 8.4–10.5)
CALCIUM SERPL-MCNC: 9.1 MG/DL — SIGNIFICANT CHANGE UP (ref 8.4–10.5)
CALCIUM SERPL-MCNC: 9.1 MG/DL — SIGNIFICANT CHANGE UP (ref 8.4–10.5)
CHLORIDE SERPL-SCNC: 93 MMOL/L — LOW (ref 96–108)
CHLORIDE SERPL-SCNC: 96 MMOL/L — SIGNIFICANT CHANGE UP (ref 96–108)
CHLORIDE SERPL-SCNC: 99 MMOL/L — SIGNIFICANT CHANGE UP (ref 96–108)
CO2 SERPL-SCNC: 23 MMOL/L — SIGNIFICANT CHANGE UP (ref 22–31)
CO2 SERPL-SCNC: 23 MMOL/L — SIGNIFICANT CHANGE UP (ref 22–31)
CO2 SERPL-SCNC: 24 MMOL/L — SIGNIFICANT CHANGE UP (ref 22–31)
CREAT SERPL-MCNC: 1.98 MG/DL — HIGH (ref 0.5–1.3)
CREAT SERPL-MCNC: 2 MG/DL — HIGH (ref 0.5–1.3)
CREAT SERPL-MCNC: 2.05 MG/DL — HIGH (ref 0.5–1.3)
GLUCOSE SERPL-MCNC: 105 MG/DL — HIGH (ref 70–99)
GLUCOSE SERPL-MCNC: 120 MG/DL — HIGH (ref 70–99)
GLUCOSE SERPL-MCNC: 169 MG/DL — HIGH (ref 70–99)
HCT VFR BLD CALC: 34.6 % — SIGNIFICANT CHANGE UP (ref 34.5–45)
HGB BLD-MCNC: 11.2 G/DL — LOW (ref 11.5–15.5)
MCHC RBC-ENTMCNC: 28.4 PG — SIGNIFICANT CHANGE UP (ref 27–34)
MCHC RBC-ENTMCNC: 32.4 GM/DL — SIGNIFICANT CHANGE UP (ref 32–36)
MCV RBC AUTO: 87.8 FL — SIGNIFICANT CHANGE UP (ref 80–100)
OSMOLALITY SERPL: 300 MOSMOL/KG — SIGNIFICANT CHANGE UP (ref 280–301)
OSMOLALITY UR: 344 MOSM/KG — SIGNIFICANT CHANGE UP (ref 50–1200)
PLATELET # BLD AUTO: 210 K/UL — SIGNIFICANT CHANGE UP (ref 150–400)
POTASSIUM SERPL-MCNC: 4.3 MMOL/L — SIGNIFICANT CHANGE UP (ref 3.5–5.3)
POTASSIUM SERPL-MCNC: 4.6 MMOL/L — SIGNIFICANT CHANGE UP (ref 3.5–5.3)
POTASSIUM SERPL-MCNC: 4.8 MMOL/L — SIGNIFICANT CHANGE UP (ref 3.5–5.3)
POTASSIUM SERPL-SCNC: 4.3 MMOL/L — SIGNIFICANT CHANGE UP (ref 3.5–5.3)
POTASSIUM SERPL-SCNC: 4.6 MMOL/L — SIGNIFICANT CHANGE UP (ref 3.5–5.3)
POTASSIUM SERPL-SCNC: 4.8 MMOL/L — SIGNIFICANT CHANGE UP (ref 3.5–5.3)
RBC # BLD: 3.94 M/UL — SIGNIFICANT CHANGE UP (ref 3.8–5.2)
RBC # FLD: 14.8 % — HIGH (ref 10.3–14.5)
SODIUM SERPL-SCNC: 129 MMOL/L — LOW (ref 135–145)
SODIUM SERPL-SCNC: 132 MMOL/L — LOW (ref 135–145)
SODIUM SERPL-SCNC: 132 MMOL/L — LOW (ref 135–145)
SODIUM UR-SCNC: 30 MMOL/L — SIGNIFICANT CHANGE UP
SODIUM UR-SCNC: 51 MMOL/L — SIGNIFICANT CHANGE UP
TSH SERPL-MCNC: 0.37 UIU/ML — SIGNIFICANT CHANGE UP (ref 0.27–4.2)
WBC # BLD: 9.01 K/UL — SIGNIFICANT CHANGE UP (ref 3.8–10.5)
WBC # FLD AUTO: 9.01 K/UL — SIGNIFICANT CHANGE UP (ref 3.8–10.5)

## 2019-10-02 PROCEDURE — 93923 UPR/LXTR ART STDY 3+ LVLS: CPT | Mod: 26

## 2019-10-02 PROCEDURE — 99233 SBSQ HOSP IP/OBS HIGH 50: CPT | Mod: GC

## 2019-10-02 RX ADMIN — Medication 25 MILLIGRAM(S): at 21:27

## 2019-10-02 RX ADMIN — ATORVASTATIN CALCIUM 80 MILLIGRAM(S): 80 TABLET, FILM COATED ORAL at 21:27

## 2019-10-02 RX ADMIN — Medication 100 MILLIGRAM(S): at 12:15

## 2019-10-02 RX ADMIN — Medication 650 MILLIGRAM(S): at 21:27

## 2019-10-02 RX ADMIN — HEPARIN SODIUM 5000 UNIT(S): 5000 INJECTION INTRAVENOUS; SUBCUTANEOUS at 05:50

## 2019-10-02 RX ADMIN — Medication 40 MILLIGRAM(S): at 05:50

## 2019-10-02 RX ADMIN — HEPARIN SODIUM 5000 UNIT(S): 5000 INJECTION INTRAVENOUS; SUBCUTANEOUS at 18:03

## 2019-10-02 RX ADMIN — Medication 650 MILLIGRAM(S): at 00:01

## 2019-10-02 RX ADMIN — CLOPIDOGREL BISULFATE 75 MILLIGRAM(S): 75 TABLET, FILM COATED ORAL at 12:15

## 2019-10-02 RX ADMIN — SENNA PLUS 2 TABLET(S): 8.6 TABLET ORAL at 21:27

## 2019-10-02 RX ADMIN — Medication 81 MILLIGRAM(S): at 12:14

## 2019-10-02 RX ADMIN — Medication 650 MILLIGRAM(S): at 22:00

## 2019-10-02 RX ADMIN — Medication 25 MILLIGRAM(S): at 05:50

## 2019-10-02 RX ADMIN — Medication 75 MILLIGRAM(S): at 05:49

## 2019-10-02 RX ADMIN — Medication 25 MILLIGRAM(S): at 14:20

## 2019-10-02 NOTE — PROGRESS NOTE ADULT - ASSESSMENT
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

## 2019-10-02 NOTE — PROGRESS NOTE ADULT - SUBJECTIVE AND OBJECTIVE BOX
INTERVAL EVENTS: YI    SUBJECTIVE:   used  No acute complaints  She walked with author two days ago but not since then.    ROS:  General - No fevers or chills  Cards - No chest pain or palpitations  Pulm - No cough or shortness of breath  GI - No abdominal pain, diarrhea, melena, or hematochezia. Last BM was two days ago.   - No dysuria or hematuria    OBJECTIVE:  Vitals Last 24 hrs  Vital Signs Last 24 Hrs  T(C): 36.9 (02 Oct 2019 04:20), Max: 37 (01 Oct 2019 21:25)  T(F): 98.4 (02 Oct 2019 04:20), Max: 98.6 (01 Oct 2019 21:25)  HR: 80 (02 Oct 2019 04:20) (80 - 97)  BP: 113/74 (02 Oct 2019 04:20) (106/67 - 124/82)  BP(mean): --  RR: 16 (02 Oct 2019 04:20) (16 - 18)  SpO2: 97% (02 Oct 2019 04:20) (97% - 97%)    IOs  I&O's Summary    01 Oct 2019 07:01  -  02 Oct 2019 07:00  --------------------------------------------------------  IN: 940 mL / OUT: 700 mL / NET: 240 mL        PE:  General - No acute distress  Heart - Normal S1 and S2. No murmurs auscultated. No S3.  Lungs - Trace basilar crackles.  GI - Abdomen soft, NT, ND.  Extremities - No lower extremity edema. Peripheral pulses intact. Calves nontender.  Skin - No rashes on extremities    LABS:  10-02    132<L>  |  99  |  65<H>  ----------------------------<  105<H>  4.6   |  23  |  2.05<H>    Ca    9.0      02 Oct 2019 04:37  Phos  3.1     10-01  Mg     2.4     10-01    TPro  6.9  /  Alb  3.6  /  TBili  0.7  /  DBili  x   /  AST  35  /  ALT  42  /  AlkPhos  97  10-01                            11.2   9.01  )-----------( 210      ( 02 Oct 2019 05:35 )             34.6           IMAGING: No new imaging INTERVAL EVENTS: YI    SUBJECTIVE:   used  No acute complaints  She walked with author two days ago but not since then.    ROS:  General - No fevers or chills  Cards - No chest pain or palpitations  Pulm - No cough or shortness of breath  GI - No abdominal pain, diarrhea, melena, or hematochezia. Last BM was two days ago.   - No dysuria or hematuria    OBJECTIVE:  Vitals Last 24 hrs  Vital Signs Last 24 Hrs  T(C): 36.9 (02 Oct 2019 04:20), Max: 37 (01 Oct 2019 21:25)  T(F): 98.4 (02 Oct 2019 04:20), Max: 98.6 (01 Oct 2019 21:25)  HR: 80 (02 Oct 2019 04:20) (80 - 97)  BP: 113/74 (02 Oct 2019 04:20) (106/67 - 124/82)  BP(mean): --  RR: 16 (02 Oct 2019 04:20) (16 - 18)  SpO2: 97% (02 Oct 2019 04:20) (97% - 97%)    IOs  I&O's Summary    01 Oct 2019 07:01  -  02 Oct 2019 07:00  --------------------------------------------------------  IN: 940 mL / OUT: 700 mL / NET: 240 mL        PE:  General - No acute distress  Heart - Normal S1 and S2. No murmurs auscultated. No S3.  Lungs - Trace basilar crackles.  GI - Abdomen soft, NT, ND.  Extremities - No lower extremity edema. Peripheral pulses intact. Calves nontender.  Skin - No rashes on extremities    LABS:  10-02    132<L>  |  99  |  65<H>  ----------------------------<  105<H>  4.6   |  23  |  2.05<H>    Ca    9.0      02 Oct 2019 04:37  Phos  3.1     10-01  Mg     2.4     10-01    TPro  6.9  /  Alb  3.6  /  TBili  0.7  /  DBili  x   /  AST  35  /  ALT  42  /  AlkPhos  97  10-01                            11.2   9.01  )-----------( 210      ( 02 Oct 2019 05:35 )             34.6           IMAGING: No new imaging    Tele: Sinus 70-80s with 1st degree block

## 2019-10-02 NOTE — PROGRESS NOTE ADULT - PROBLEM SELECTOR PLAN 1
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 Seymour) and cardiology (Dr. Wick) consulted.  - Cardiac viability shows moderate residual viability. Interventional cardiology and CTS are not offering intervention. Cardiology may consider PCI after trying medical optimization, but high threshold.  - ASA and plavix  - Atorvastatin 80 mg q Day  - Metoprolol increased to 75 mg ER  -TFTs normalized   -C/w telemetry.  -Hydralazine 25 mg TID per discussion with heart failure. Prioritizing increase in metoprolol.

## 2019-10-02 NOTE — PROGRESS NOTE ADULT - PROBLEM SELECTOR PLAN 7
Diet: Mechanical soft low fat diet with Glucerna Halal fluid restricted. -Nutrition eval appreciated.  DVT Prophylaxis: Heparin SQ  Dispo: PT recs home with assist.

## 2019-10-02 NOTE — PROGRESS NOTE ADULT - ASSESSMENT
ASSESSMENT/PLAN: 	  82 yo F with HTN, HF who presented to OSH with CP and SOB for the last few weeks. She had no EKG changes but noted to have trop of 1.38 and she was transferred to [facility] for cardiac cath, found to have triple vessel disease, as well as elevated filling pressures on RHC. Not a candidate for CABG or PCI due to poor targets. Now asymptomatic and euvolemic.    1. Ischemic cardiomyopathy, EF 30%  - c/w lasix 40mg daily  - c/w hydralazine to 25mg TID, hold for SBP<90  - c/w metop to succinate 75mg daily  - hold ARB due to WINSTON  - ok to d/c from HF standpoint    2. CAD  - s/p cath with TVD, viability study showing significant viable tissue  - c/w asa, atorvastatin 80mg daily, plavix 75mg daily  - studies reviewed by cardiac surgery and Dr. Wick - vasculature not amenable for revascularization  - walk patient once more today - if no CP/SOB can pursue medical management    3. Claudication  - notes bilateral calf pain when walking  - obtain TIA and LE arterial dopplers (can be done as outpatient)  - encourage ambulation    4. HTN  - c/w BB, hydralazine    5. WINSTON on CKD  - Cr now improving, baseline 1.9 per family  - likely due to contrast load from cath and CT PE at OSH  - hold ARB    Ok to d/c if no CP/SOB on ambulation. Outpatient follow up with Dr. Wick. HF will sign off, please call back with additional questions.    Adalid Newton MD  Cardiology Fellow   [phone], M-F 7:30A-5P    All Cardiology service information can be found on [email or web address], password: [email or web address].

## 2019-10-02 NOTE — PROGRESS NOTE ADULT - PROBLEM SELECTOR PLAN 4
Unclear baseline, suspect combination of hemodynamic WISNTON as well as possible MARLENA in setting of some likely underlying CKD.   - Continue to trend  - Renal US showed no hydronephrosis and only medical renal disease.   - Appreciate renal - likely due to contrast.

## 2019-10-02 NOTE — PROGRESS NOTE ADULT - PROBLEM SELECTOR PLAN 2
Euvolemic hyponatremia  -Rising urine osm (now > 300) off hypertonic saline  -Renal recs appreciated.  -Q6h BMP  -UreNa 15 G bid, ensure that she is taking

## 2019-10-02 NOTE — PROGRESS NOTE ADULT - ATTENDING COMMENTS
I have seen this patient with the fellow and agree with their assessment and plan. In addition,      # WINSTON - multifactorial. Contrast induced nephropathy + hemodynamic ATN. Serum creatinine improving to 2.0 today.     # hypotonic, hyponatremia - Urine osm <300. Etiology of hyponatremia is multifactorial. She likely has low water clearance from WINSTON, and she typically takes a low solute diet. Serum sodium improved to 132 with hypertonic saline. Patient was also started on furosemide 40mg daily by cardiology. We can continue UreNa for now. To repeat serum sodium this evening.     # Hypertension - Patient was hypotensive previously. Now BP improved. OK to resume hydralazine dose of 37.5mg tid if needed.    # CKD - baseline serum creatinine 1.8. Etiology of CKD unclear. UA bland. Probably secondary to microvascular disease/ hypertension.     # Medication toxicity Monitoring: medication dose reviewed. Please dose medications to eGFR<15    The rest of the recommendations as per fellow's note.    Brielle Umanzor MD  Attending Nephrologist  [phone]

## 2019-10-02 NOTE — PROGRESS NOTE ADULT - SUBJECTIVE AND OBJECTIVE BOX
[facility] Division of Kidney Diseases & Hypertension  FOLLOW UP NOTE  [phone]--------------------------------------------------------------------------------  Chief Complaint:Non-ST elevation myocardial infarction (NSTEMI)      24 hour events/subjective:  Patient seen this morning. Appears better today compared to yesterday. Charts have been reviewed. Her serum Na yesterday was 123. She was restarted on hypertonic saline. Her serum Na levels have been increasing and improving slowly overnight. Her sodium this morning is 132 (improved). She denies any subjective complaints of chest pain, shortness of breath, no nausea/vomiting. Currently hemodynamically stable. No febrile episodes overnight.           PAST HISTORY  --------------------------------------------------------------------------------  No significant changes to PMH, PSH, FHx, SHx, unless otherwise noted    ALLERGIES & MEDICATIONS  --------------------------------------------------------------------------------  Allergies    No Known Allergies    Intolerances      Standing Inpatient Medications  aspirin enteric coated 81 milliGRAM(s) Oral daily  atorvastatin 80 milliGRAM(s) Oral at bedtime  clopidogrel Tablet 75 milliGRAM(s) Oral daily  docusate sodium 100 milliGRAM(s) Oral daily  furosemide    Tablet 40 milliGRAM(s) Oral daily  heparin  Injectable 5000 Unit(s) SubCutaneous two times a day  hydrALAZINE 25 milliGRAM(s) Oral three times a day  metoprolol succinate ER 75 milliGRAM(s) Oral daily  senna 2 Tablet(s) Oral at bedtime  Ure-Na 15 Gram(s) 15 Gram(s) Oral two times a day    PRN Inpatient Medications  acetaminophen   Tablet .. 650 milliGRAM(s) Oral every 6 hours PRN  bisacodyl 5 milliGRAM(s) Oral every 12 hours PRN        REVIEW OF SYSTEMS  --------------------------------------------------------------------------------  Gen: No  fevers, poor appetite   Skin: No rashes  Respiratory: No dyspnea  CV: No chest pain  GI: No abdominal pain, diarrhea, nausea, vomiting  MSK: No joint pain/swelling  Neuro: No dizziness/lightheadedness, weakness    All other systems were reviewed and are negative, except as noted.    VITALS/PHYSICAL EXAM  --------------------------------------------------------------------------------  T(C): 36.7 (10-02-19 @ 08:50), Max: 37 (10-01-19 @ 21:25)  HR: 83 (10-02-19 @ 08:50) (80 - 97)  BP: 107/70 (10-02-19 @ 08:50) (106/67 - 124/82)  RR: 20 (10-02-19 @ 08:50) (16 - 20)  SpO2: 98% (10-02-19 @ 08:50) (97% - 98%)  Wt(kg): --        10-01-19 @ 07:01  -  10-02-19 @ 07:00  --------------------------------------------------------  IN: 940 mL / OUT: 700 mL / NET: 240 mL      Physical Exam:  	Gen: NAD, well-appearing  	HEENT: supple, no JVD  	Pulm: CTABL  	CV:  S1S2  	Abd: +BS, soft   	MSK: No B/L Lower ext edema  	Neuro: No focal deficits  	Skin: Warm, without rashes  	Vascular: No cyanosis    LABS/STUDIES  --------------------------------------------------------------------------------              11.2   9.01  >-----------<  210      [10-02-19 @ 05:35]              34.6     132  |  99  |  65  ----------------------------<  105      [10-02-19 @ 04:37]  4.6   |  23  |  2.05        Ca     9.0     [10-02-19 @ 04:37]      Mg     2.4     [10-01-19 @ 07:31]      Phos  3.1     [10-01-19 @ 07:31]    TPro  6.9  /  Alb  3.6  /  TBili  0.7  /  DBili  x   /  AST  35  /  ALT  42  /  AlkPhos  97  [10-01-19 @ 07:31]          Creatinine Trend:  SCr 2.05 [10-02 @ 04:37]  SCr 2.05 [10-01 @ 18:32]  SCr 2.06 [10-01 @ 13:05]  SCr 2.09 [10-01 @ 07:31]  SCr 2.11 [09-30 @ 13:16]    Urinalysis - [09-29-19 @ 01:33]      Color Light Yellow / Appearance Clear / SG 1.009 / pH 7.0      Gluc Trace / Ketone Negative  / Bili Negative / Urobili <2 mg/dL       Blood Negative / Protein Trace / Leuk Est Negative / Nitrite Negative      RBC  / WBC  / Hyaline  / Gran  / Sq Epi  / Non Sq Epi  / Bacteria     Urine Sodium 30      [10-01-19 @ 23:56]  Urine Osmolality 344      [10-02-19 @ 02:51]    TSH 0.37      [10-02-19 @ 05:35]

## 2019-10-02 NOTE — PROGRESS NOTE ADULT - ATTENDING COMMENTS
82 YO F with a history of ACC/AHA Stage C ischemic cardiomyopathy, HTN, and CKD III (baseline Cr 1.8) who presented with NSTEMI and found to have 3v CAD for which heart failure cardiology consulted for further management. She is now euvolemic and does not have reduced cardiac output on invasive hemodynamics despite very high SVR at time of assessment. She has room for afterload reduction and has had no prior HF hospitalizations. She has myocardial viability but not deemed to be a suitable candidate for surgical revascularization due to risk or percutaneous revascularization due to anatomy.     Review of pertinent studies reveals:  NM viability: gated EF 37%, mostly viable myocardium aside from distal anteroseptal/anterior/inferolateral/apical walls which have minimal viability     TTE 9/24/19: very limited images (only contrast enhanced images helpful), LV non-dilated, EF ~30%, akinesis of inferolateral wall and apex    LHC 9/24/19: severe diffuse mid LAD stenosis, OM2 subtotally occluded, ramus occluded, RCA occluded    RHC 9/24/19: RA 10, PA 48/23 (34), PCWP 29, Sherry CO/CI 3.7/2.2,  with SVR 1990      The plan for today is as follows:  - continue lasix at 40 mg daily, euvolemic on exam  - continue hydralizine 25 mg TID and metoprolol succinate to 75 mg daily. avoid RAAS inhibitor with WINSTON  - viability study showing mostly viable myocardium, per Dr. Wick and cardiac surgery she is not a good candidate for either form of revascularization so will continue medical management   - continue ASA/statin  - continue plavix for treatment of NSTEMI  - TIA and LEAFS for claudication symptoms   - nephrology following re: hyponatremia in setting of WINSTON  - would walk her again today to ensure she does not have angina, otherwise can plan for d/c planning with cardiology f/u to make sure her CAD is able to me managed medically

## 2019-10-02 NOTE — PROGRESS NOTE ADULT - PROBLEM SELECTOR PLAN 2
Patient current with euvolemic hyponatremia in addition to poor PO/ osmolar intake. Her sodium yesterday decreased to 123 and received another dose of hypertonic saline. Sodium levels have been improving for the past 24 hours.  Continue free water restriction for now to 1L and UreNa 15 mg BID to help excrete electrolyte-free water. Continue to monitor sodium q4-6h.

## 2019-10-02 NOTE — PROGRESS NOTE ADULT - PROBLEM SELECTOR PLAN 3
Euvolemic, no crackles, RA  - Daily standing weights and strict I's/O's.  - Hydralazine 25 TID  - Metoprolol 75 mg ER  - Continue to monitor for fluid overload. Holding lasix at this time.

## 2019-10-02 NOTE — PROGRESS NOTE ADULT - SUBJECTIVE AND OBJECTIVE BOX
Felt very chilly and had pain all over her body last night which improved with tylenol. She walked around the halls yesterday without any SOB or CP. Only complains of calf pain.    MEDICATIONS:  aspirin enteric coated 81 milliGRAM(s) Oral daily  clopidogrel Tablet 75 milliGRAM(s) Oral daily  furosemide    Tablet 40 milliGRAM(s) Oral daily  heparin  Injectable 5000 Unit(s) SubCutaneous two times a day  hydrALAZINE 25 milliGRAM(s) Oral three times a day  metoprolol succinate ER 75 milliGRAM(s) Oral daily        acetaminophen   Tablet .. 650 milliGRAM(s) Oral every 6 hours PRN    bisacodyl 5 milliGRAM(s) Oral every 12 hours PRN  docusate sodium 100 milliGRAM(s) Oral daily  senna 2 Tablet(s) Oral at bedtime    atorvastatin 80 milliGRAM(s) Oral at bedtime        REVIEW OF SYSTEMS:    CONSTITUTIONAL: No weakness, fevers or chills  EYES/ENT: No visual changes;  No dysphagia  RESPIRATORY: No cough, wheezing, hemoptysis; No shortness of breath  CARDIOVASCULAR: No chest pain or palpitations; No lower extremity edema  GASTROINTESTINAL: No abdominal or epigastric pain. No nausea, vomiting, or hematemesis  GENITOURINARY: No dysuria, frequency or hematuria  NEUROLOGICAL: No numbness or weakness  SKIN: No itching, burning, rashes, or lesions   HEME: No bleeding or bruising  MSK: No joint pains or muscle pains  All other review of systems is negative unless indicated above.    PHYSICAL EXAM:  T(C): 36.7 (10-02-19 @ 08:50), Max: 37 (10-01-19 @ 21:25)  HR: 83 (10-02-19 @ 08:50) (80 - 91)  BP: 107/70 (10-02-19 @ 08:50) (106/67 - 113/74)  RR: 20 (10-02-19 @ 08:50) (16 - 20)  SpO2: 98% (10-02-19 @ 08:50) (97% - 98%)  Wt(kg): --  I&O's Summary    01 Oct 2019 07:01  -  02 Oct 2019 07:00  --------------------------------------------------------  IN: 940 mL / OUT: 700 mL / NET: 240 mL    02 Oct 2019 07:01  -  02 Oct 2019 13:08  --------------------------------------------------------  IN: 360 mL / OUT: 0 mL / NET: 360 mL        Appearance: Normal	  HEENT:   Normal oral mucosa  Cardiovascular: Normal S1 S2, No JVD, No murmurs, No edema  Respiratory: Lungs clear to auscultation	  Psychiatry: A & O x 3, Mood & affect appropriate  Gastrointestinal:  Soft, Non-tender, + BS	  Skin: No rashes, No ecchymoses, No cyanosis	  Neurologic: Non-focal  Extremities: Normal range of motion, No clubbing, cyanosis        LABS:	 	    CBC Full  -  ( 02 Oct 2019 05:35 )  WBC Count : 9.01 K/uL  Hemoglobin : 11.2 g/dL  Hematocrit : 34.6 %  Platelet Count - Automated : 210 K/uL  Mean Cell Volume : 87.8 fl  Mean Cell Hemoglobin : 28.4 pg  Mean Cell Hemoglobin Concentration : 32.4 gm/dL  Auto Neutrophil # : x  Auto Lymphocyte # : x  Auto Monocyte # : x  Auto Eosinophil # : x  Auto Basophil # : x  Auto Neutrophil % : x  Auto Lymphocyte % : x  Auto Monocyte % : x  Auto Eosinophil % : x  Auto Basophil % : x    10-02    132<L>  |  96  |  86<H>  ----------------------------<  120<H>  4.3   |  23  |  1.98<H>  10-02    132<L>  |  99  |  65<H>  ----------------------------<  105<H>  4.6   |  23  |  2.05<H>    Ca    9.1      02 Oct 2019 11:14  Ca    9.0      02 Oct 2019 04:37  Phos  3.1     10-01  Mg     2.4     10-01    TPro  6.9  /  Alb  3.6  /  TBili  0.7  /  DBili  x   /  AST  35  /  ALT  42  /  AlkPhos  97  10-01

## 2019-10-03 LAB
ALBUMIN SERPL ELPH-MCNC: 3.6 G/DL — SIGNIFICANT CHANGE UP (ref 3.3–5)
ALP SERPL-CCNC: 93 U/L — SIGNIFICANT CHANGE UP (ref 40–120)
ALT FLD-CCNC: 34 U/L — SIGNIFICANT CHANGE UP (ref 10–45)
ANION GAP SERPL CALC-SCNC: 12 MMOL/L — SIGNIFICANT CHANGE UP (ref 5–17)
ANION GAP SERPL CALC-SCNC: 13 MMOL/L — SIGNIFICANT CHANGE UP (ref 5–17)
AST SERPL-CCNC: 28 U/L — SIGNIFICANT CHANGE UP (ref 10–40)
BILIRUB SERPL-MCNC: 0.4 MG/DL — SIGNIFICANT CHANGE UP (ref 0.2–1.2)
BUN SERPL-MCNC: 104 MG/DL — HIGH (ref 7–23)
BUN SERPL-MCNC: 98 MG/DL — HIGH (ref 7–23)
CALCIUM SERPL-MCNC: 9.1 MG/DL — SIGNIFICANT CHANGE UP (ref 8.4–10.5)
CALCIUM SERPL-MCNC: 9.1 MG/DL — SIGNIFICANT CHANGE UP (ref 8.4–10.5)
CHLORIDE SERPL-SCNC: 92 MMOL/L — LOW (ref 96–108)
CHLORIDE SERPL-SCNC: 96 MMOL/L — SIGNIFICANT CHANGE UP (ref 96–108)
CO2 SERPL-SCNC: 22 MMOL/L — SIGNIFICANT CHANGE UP (ref 22–31)
CO2 SERPL-SCNC: 25 MMOL/L — SIGNIFICANT CHANGE UP (ref 22–31)
CREAT SERPL-MCNC: 2.02 MG/DL — HIGH (ref 0.5–1.3)
CREAT SERPL-MCNC: 2.08 MG/DL — HIGH (ref 0.5–1.3)
GLUCOSE SERPL-MCNC: 101 MG/DL — HIGH (ref 70–99)
GLUCOSE SERPL-MCNC: 161 MG/DL — HIGH (ref 70–99)
HCT VFR BLD CALC: 34.2 % — LOW (ref 34.5–45)
HGB BLD-MCNC: 10.8 G/DL — LOW (ref 11.5–15.5)
MCHC RBC-ENTMCNC: 28.1 PG — SIGNIFICANT CHANGE UP (ref 27–34)
MCHC RBC-ENTMCNC: 31.6 GM/DL — LOW (ref 32–36)
MCV RBC AUTO: 88.8 FL — SIGNIFICANT CHANGE UP (ref 80–100)
PLATELET # BLD AUTO: 208 K/UL — SIGNIFICANT CHANGE UP (ref 150–400)
POTASSIUM SERPL-MCNC: 4.4 MMOL/L — SIGNIFICANT CHANGE UP (ref 3.5–5.3)
POTASSIUM SERPL-MCNC: 4.4 MMOL/L — SIGNIFICANT CHANGE UP (ref 3.5–5.3)
POTASSIUM SERPL-SCNC: 4.4 MMOL/L — SIGNIFICANT CHANGE UP (ref 3.5–5.3)
POTASSIUM SERPL-SCNC: 4.4 MMOL/L — SIGNIFICANT CHANGE UP (ref 3.5–5.3)
PROT SERPL-MCNC: 6.5 G/DL — SIGNIFICANT CHANGE UP (ref 6–8.3)
RBC # BLD: 3.85 M/UL — SIGNIFICANT CHANGE UP (ref 3.8–5.2)
RBC # FLD: 15 % — HIGH (ref 10.3–14.5)
SODIUM SERPL-SCNC: 130 MMOL/L — LOW (ref 135–145)
SODIUM SERPL-SCNC: 130 MMOL/L — LOW (ref 135–145)
WBC # BLD: 9.52 K/UL — SIGNIFICANT CHANGE UP (ref 3.8–10.5)
WBC # FLD AUTO: 9.52 K/UL — SIGNIFICANT CHANGE UP (ref 3.8–10.5)

## 2019-10-03 PROCEDURE — 99233 SBSQ HOSP IP/OBS HIGH 50: CPT | Mod: GC

## 2019-10-03 RX ADMIN — SENNA PLUS 2 TABLET(S): 8.6 TABLET ORAL at 22:16

## 2019-10-03 RX ADMIN — ATORVASTATIN CALCIUM 80 MILLIGRAM(S): 80 TABLET, FILM COATED ORAL at 22:16

## 2019-10-03 RX ADMIN — Medication 100 MILLIGRAM(S): at 11:22

## 2019-10-03 RX ADMIN — HEPARIN SODIUM 5000 UNIT(S): 5000 INJECTION INTRAVENOUS; SUBCUTANEOUS at 05:43

## 2019-10-03 RX ADMIN — Medication 650 MILLIGRAM(S): at 23:50

## 2019-10-03 RX ADMIN — Medication 650 MILLIGRAM(S): at 23:35

## 2019-10-03 RX ADMIN — Medication 81 MILLIGRAM(S): at 11:22

## 2019-10-03 RX ADMIN — Medication 40 MILLIGRAM(S): at 05:42

## 2019-10-03 RX ADMIN — CLOPIDOGREL BISULFATE 75 MILLIGRAM(S): 75 TABLET, FILM COATED ORAL at 11:22

## 2019-10-03 RX ADMIN — Medication 25 MILLIGRAM(S): at 22:16

## 2019-10-03 RX ADMIN — Medication 25 MILLIGRAM(S): at 13:12

## 2019-10-03 RX ADMIN — HEPARIN SODIUM 5000 UNIT(S): 5000 INJECTION INTRAVENOUS; SUBCUTANEOUS at 17:39

## 2019-10-03 NOTE — PHARMACOTHERAPY INTERVENTION NOTE - COMMENTS
Pharmacy student Jacqui Gallardo counseled patient's son Priya via telephone about anticipated discharge medications doses, indications, and possible side effects.     Jillian Raphael, PharmD   [phone]

## 2019-10-03 NOTE — PROGRESS NOTE ADULT - ATTENDING COMMENTS
I have seen this patient with the fellow and agree with their assessment and plan. In addition,      # WINSTON - multifactorial. Contrast induced nephropathy + hemodynamic ATN. Serum creatinine improving to 2.0 today.     # hypotonic, hyponatremia - Serum osm is now 300 (normal to high). This is likely due to UreNa. Urine osm >300, ADH is on. She may also have a component of SIADH. Continue po lasix. Stop UreNa.  Repeat urine osm again tomorrow.     # Hypertension - Bp acceptable. Continue hydralazine and metoprolol.    # Heart failure - She appears clinically euvolemic. Continue furosemide 40mg oral daily to maintain her current volume status.     # CKD - baseline serum creatinine 1.8. Etiology of CKD unclear. UA bland. Probably secondary to microvascular disease/ hypertension.     # Medication toxicity Monitoring: medication dose reviewed. Please dose medications to eGFR<15    The rest of the recommendations as per fellow's note.    Brielle Umanzor MD  Attending Nephrologist  [phone]

## 2019-10-03 NOTE — PROGRESS NOTE ADULT - PROBLEM SELECTOR PLAN 2
Patient current with euvolemic hyponatremia in addition to poor PO/ osmolar intake. Her sodium level has now been improving after fluid restriction and s/p hypertonic saline. Current serum Na: 120. Continue free water restriction for now to 1L and UreNa 15 mg BID to help excrete electrolyte-free water. Continue to monitor sodium every 12 hours. Daily urine osm.

## 2019-10-03 NOTE — PROGRESS NOTE ADULT - SUBJECTIVE AND OBJECTIVE BOX
INTERVAL EVENTS: maylin    SUBJECTIVE:   No acute complaints. She is upset that blood got on her blanket during a draw.  She eats some but not all of her meals.    ROS:  General - No fevers or chills  Cards - No chest pain or palpitations  Pulm - No cough or shortness of breath  GI - Last BM yesterday. Normal.   - No dysuria or hematuria    OBJECTIVE:  Vitals Last 24 hrs  Vital Signs Last 24 Hrs  T(C): 37.2 (03 Oct 2019 05:23), Max: 37.2 (03 Oct 2019 05:23)  T(F): 98.9 (03 Oct 2019 05:23), Max: 98.9 (03 Oct 2019 05:23)  HR: 75 (03 Oct 2019 05:23) (75 - 78)  BP: 107/64 (03 Oct 2019 05:23) (107/64 - 119/77)  BP(mean): --  RR: 18 (03 Oct 2019 05:23) (18 - 20)  SpO2: 97% (03 Oct 2019 05:23) (97% - 98%)    IOs  I&O's Summary    02 Oct 2019 07:01  -  03 Oct 2019 07:00  --------------------------------------------------------  IN: 1200 mL / OUT: 1000 mL / NET: 200 mL        PE:  General - No acute distress. Moving normally, comfortably.  Heart - Normal S1 and S2. No murmurs auscultated.  Lungs - Clear to auscultation bilaterally. No crackles.  GI - Abdomen soft, NT, ND.  Extremities - No lower extremity edema. Peripheral pulses intact. Calves nontender.  Skin - No rashes on extremities    LABS:  10-03    130<L>  |  96  |  104<H>  ----------------------------<  101<H>  4.4   |  22  |  2.08<H>    Ca    9.1      03 Oct 2019 06:29    TPro  6.5  /  Alb  3.6  /  TBili  0.4  /  DBili  x   /  AST  28  /  ALT  34  /  AlkPhos  93  10-03                            10.8   9.52  )-----------( 208      ( 03 Oct 2019 09:14 )             34.2           IMAGING:  No new imaging INTERVAL EVENTS: maylin    SUBJECTIVE:   No acute complaints. She is upset that blood got on her blanket during a draw.  She eats some but not all of her meals.    ROS:  General - No fevers or chills  Cards - No chest pain or palpitations  Pulm - No cough or shortness of breath  GI - Last BM yesterday. Normal.   - No dysuria or hematuria    OBJECTIVE:  Vitals Last 24 hrs  Vital Signs Last 24 Hrs  T(C): 37.2 (03 Oct 2019 05:23), Max: 37.2 (03 Oct 2019 05:23)  T(F): 98.9 (03 Oct 2019 05:23), Max: 98.9 (03 Oct 2019 05:23)  HR: 75 (03 Oct 2019 05:23) (75 - 78)  BP: 107/64 (03 Oct 2019 05:23) (107/64 - 119/77)  BP(mean): --  RR: 18 (03 Oct 2019 05:23) (18 - 20)  SpO2: 97% (03 Oct 2019 05:23) (97% - 98%)    IOs  I&O's Summary    02 Oct 2019 07:01  -  03 Oct 2019 07:00  --------------------------------------------------------  IN: 1200 mL / OUT: 1000 mL / NET: 200 mL        PE:  General - No acute distress. Moving normally, comfortably.  Heart - Normal S1 and S2. No murmurs auscultated.  Lungs - Clear to auscultation bilaterally. No crackles.  GI - Abdomen soft, NT, ND.  Extremities - No lower extremity edema. Peripheral pulses intact. Calves nontender.  Skin - No rashes on extremities    LABS:  10-03    130<L>  |  96  |  104<H>  ----------------------------<  101<H>  4.4   |  22  |  2.08<H>    Ca    9.1      03 Oct 2019 06:29    TPro  6.5  /  Alb  3.6  /  TBili  0.4  /  DBili  x   /  AST  28  /  ALT  34  /  AlkPhos  93  10-03                            10.8   9.52  )-----------( 208      ( 03 Oct 2019 09:14 )             34.2           IMAGING:  No new imaging    Tele: Sinus 1st degree 70-90s with PVCs

## 2019-10-03 NOTE — PROGRESS NOTE ADULT - SUBJECTIVE AND OBJECTIVE BOX
[facility] Division of Kidney Diseases & Hypertension  FOLLOW UP NOTE  [phone]--------------------------------------------------------------------------------  Chief Complaint:Non-ST elevation myocardial infarction (NSTEMI)      24 hour events/subjective:  Patient seen sleeping comfortably in bed this morning. Wakes up to verbal commands. Patient appears well without subjective complaints. Charts reviewed. Patient sodium has been stable at 130 this morning         PAST HISTORY  --------------------------------------------------------------------------------  No significant changes to PMH, PSH, FHx, SHx, unless otherwise noted    ALLERGIES & MEDICATIONS  --------------------------------------------------------------------------------  Allergies    No Known Allergies    Intolerances      Standing Inpatient Medications  aspirin enteric coated 81 milliGRAM(s) Oral daily  atorvastatin 80 milliGRAM(s) Oral at bedtime  clopidogrel Tablet 75 milliGRAM(s) Oral daily  docusate sodium 100 milliGRAM(s) Oral daily  furosemide    Tablet 40 milliGRAM(s) Oral daily  heparin  Injectable 5000 Unit(s) SubCutaneous two times a day  hydrALAZINE 25 milliGRAM(s) Oral three times a day  metoprolol succinate ER 75 milliGRAM(s) Oral daily  senna 2 Tablet(s) Oral at bedtime  Ure-Na 15 Gram(s) 15 Gram(s) Oral three times a day    PRN Inpatient Medications  acetaminophen   Tablet .. 650 milliGRAM(s) Oral every 6 hours PRN  bisacodyl 5 milliGRAM(s) Oral every 12 hours PRN      REVIEW OF SYSTEMS  --------------------------------------------------------------------------------  Gen: No  fevers, poor appetite   Skin: No rashes  Respiratory: No dyspnea  CV: No chest pain  GI: No abdominal pain, diarrhea, nausea, vomiting  MSK: No joint pain/swelling  Neuro: No dizziness/lightheadedness, weakness    All other systems were reviewed and are negative, except as noted.    VITALS/PHYSICAL EXAM  --------------------------------------------------------------------------------  T(C): 37.2 (10-03-19 @ 05:23), Max: 37.2 (10-03-19 @ 05:23)  HR: 75 (10-03-19 @ 05:23) (75 - 78)  BP: 107/64 (10-03-19 @ 05:23) (107/64 - 119/77)  RR: 18 (10-03-19 @ 05:23) (18 - 20)  SpO2: 97% (10-03-19 @ 05:23) (97% - 98%)  Wt(kg): --        10-02-19 @ 07:01  -  10-03-19 @ 07:00  --------------------------------------------------------  IN: 1200 mL / OUT: 1000 mL / NET: 200 mL    10-03-19 @ 07:01  -  10-03-19 @ 11:40  --------------------------------------------------------  IN: 250 mL / OUT: 500 mL / NET: -250 mL      Physical Exam:  	Gen: NAD, well-appearing  	HEENT: supple, no JVD  	Pulm: CTABL  	CV:  S1S2  	Abd: +BS, soft   	MSK: No B/L Lower ext edema  	Neuro: No focal deficits  	Skin: Warm, without rashes  	Vascular: No cyanosis    LABS/STUDIES  --------------------------------------------------------------------------------              10.8   9.52  >-----------<  208      [10-03-19 @ 09:14]              34.2     130  |  96  |  104  ----------------------------<  101      [10-03-19 @ 06:29]  4.4   |  22  |  2.08        Ca     9.1     [10-03-19 @ 06:29]    TPro  6.5  /  Alb  3.6  /  TBili  0.4  /  DBili  x   /  AST  28  /  ALT  34  /  AlkPhos  93  [10-03-19 @ 06:29]        Serum Osmolality 300      [10-02-19 @ 13:54]    Creatinine Trend:  SCr 2.08 [10-03 @ 06:29]  SCr 2.00 [10-02 @ 19:27]  SCr 1.98 [10-02 @ 11:14]  SCr 2.05 [10-02 @ 04:37]  SCr 2.05 [10-01 @ 18:32]    Urinalysis - [09-29-19 @ 01:33]      Color Light Yellow / Appearance Clear / SG 1.009 / pH 7.0      Gluc Trace / Ketone Negative  / Bili Negative / Urobili <2 mg/dL       Blood Negative / Protein Trace / Leuk Est Negative / Nitrite Negative      RBC  / WBC  / Hyaline  / Gran  / Sq Epi  / Non Sq Epi  / Bacteria     Urine Sodium 51      [10-02-19 @ 13:54]  Urine Osmolality 344      [10-02-19 @ 02:51]    TSH 0.37      [10-02-19 @ 05:35]

## 2019-10-03 NOTE — PROGRESS NOTE ADULT - PROBLEM SELECTOR PLAN 7
Diet: Regular diet with Glucerna Halal fluid restricted. -Nutrition eval appreciated.  DVT Prophylaxis: Heparin SQ  Dispo: PT recs home with assist.

## 2019-10-03 NOTE — PROGRESS NOTE ADULT - PROBLEM SELECTOR PLAN 2
Euvolemic hyponatremia. Low solute intake v reset osmostat given urine osm > serum osm on 10/2.  -Rising urine osm (now > 300) off hypertonic saline. Greater than serum osm.  -Renal recs appreciated.  -Q12 h BMP  -UreNa 15 G TID  -Encourage PO Euvolemic hyponatremia. Low solute intake v reset osmostat given urine osm > serum osm on 10/2.  -Rising urine osm (now > 300) off hypertonic saline. Greater than serum osm.  -Renal recs appreciated.  -Q12 h BMP  -Hold UreNa. recheck urine osm tomorrow. If decreased tomorrow, then this was, in fact, poor solute intake and she can go home where she will have a better appetite.  -Encourage PO

## 2019-10-03 NOTE — PROGRESS NOTE ADULT - PROBLEM SELECTOR PLAN 6
Per chart review, patient on amlodipine 5 mg q Day at home  - Hold amlodipine.  - Hydralazine and metoprolol for now.  - Will d/c amlodipine in favor of metoprolol and hydralazine as outpatient.

## 2019-10-03 NOTE — PROGRESS NOTE ADULT - PROBLEM SELECTOR PLAN 1
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 Seymour) and cardiology (Dr. Wick) consulted.  - Stable on medical management. Will walk again today, if no chest pain then will continue medical management and defer intervention.  - ASA and plavix  - Atorvastatin 80 mg q Day  - Metoprolol increased to 75 mg ER   -C/w telemetry.  -Hydralazine 25 mg TID per discussion with heart failure. Prioritizing increase in metoprolol.

## 2019-10-03 NOTE — PROGRESS NOTE ADULT - ASSESSMENT
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

## 2019-10-03 NOTE — PROGRESS NOTE ADULT - PROBLEM SELECTOR PLAN 3
Euvolemic, no crackles, RA  - Daily standing weights and strict I's/O's.  - Hydralazine 25 TID  - Metoprolol 75 mg ER  - Lasix 40 mg po daily

## 2019-10-03 NOTE — PROGRESS NOTE ADULT - PROBLEM SELECTOR PLAN 4
Unclear baseline, suspect combination of hemodynamic WINSTON as well as possible MARLENA in setting of some likely underlying CKD.   - Continue to trend  - Renal US showed no hydronephrosis and only medical renal disease.   - Appreciate renal - likely due to contrast.

## 2019-10-04 VITALS
TEMPERATURE: 98 F | DIASTOLIC BLOOD PRESSURE: 73 MMHG | HEART RATE: 87 BPM | RESPIRATION RATE: 19 BRPM | OXYGEN SATURATION: 98 % | SYSTOLIC BLOOD PRESSURE: 115 MMHG

## 2019-10-04 LAB
ANION GAP SERPL CALC-SCNC: 10 MMOL/L — SIGNIFICANT CHANGE UP (ref 5–17)
ANION GAP SERPL CALC-SCNC: 13 MMOL/L — SIGNIFICANT CHANGE UP (ref 5–17)
BUN SERPL-MCNC: 81 MG/DL — HIGH (ref 7–23)
BUN SERPL-MCNC: 92 MG/DL — HIGH (ref 7–23)
CALCIUM SERPL-MCNC: 9.4 MG/DL — SIGNIFICANT CHANGE UP (ref 8.4–10.5)
CALCIUM SERPL-MCNC: 9.6 MG/DL — SIGNIFICANT CHANGE UP (ref 8.4–10.5)
CHLORIDE SERPL-SCNC: 94 MMOL/L — LOW (ref 96–108)
CHLORIDE SERPL-SCNC: 95 MMOL/L — LOW (ref 96–108)
CO2 SERPL-SCNC: 24 MMOL/L — SIGNIFICANT CHANGE UP (ref 22–31)
CO2 SERPL-SCNC: 25 MMOL/L — SIGNIFICANT CHANGE UP (ref 22–31)
CREAT SERPL-MCNC: 2.15 MG/DL — HIGH (ref 0.5–1.3)
CREAT SERPL-MCNC: 2.19 MG/DL — HIGH (ref 0.5–1.3)
GLUCOSE BLDC GLUCOMTR-MCNC: 233 MG/DL — HIGH (ref 70–99)
GLUCOSE SERPL-MCNC: 109 MG/DL — HIGH (ref 70–99)
GLUCOSE SERPL-MCNC: 142 MG/DL — HIGH (ref 70–99)
HCT VFR BLD CALC: 39.3 % — SIGNIFICANT CHANGE UP (ref 34.5–45)
HGB BLD-MCNC: 12.3 G/DL — SIGNIFICANT CHANGE UP (ref 11.5–15.5)
MAGNESIUM SERPL-MCNC: 2.4 MG/DL — SIGNIFICANT CHANGE UP (ref 1.6–2.6)
MCHC RBC-ENTMCNC: 27.6 PG — SIGNIFICANT CHANGE UP (ref 27–34)
MCHC RBC-ENTMCNC: 31.3 GM/DL — LOW (ref 32–36)
MCV RBC AUTO: 88.1 FL — SIGNIFICANT CHANGE UP (ref 80–100)
PHOSPHATE SERPL-MCNC: 3.1 MG/DL — SIGNIFICANT CHANGE UP (ref 2.5–4.5)
PLATELET # BLD AUTO: 245 K/UL — SIGNIFICANT CHANGE UP (ref 150–400)
POTASSIUM SERPL-MCNC: 4.3 MMOL/L — SIGNIFICANT CHANGE UP (ref 3.5–5.3)
POTASSIUM SERPL-MCNC: 4.7 MMOL/L — SIGNIFICANT CHANGE UP (ref 3.5–5.3)
POTASSIUM SERPL-SCNC: 4.3 MMOL/L — SIGNIFICANT CHANGE UP (ref 3.5–5.3)
POTASSIUM SERPL-SCNC: 4.7 MMOL/L — SIGNIFICANT CHANGE UP (ref 3.5–5.3)
RBC # BLD: 4.46 M/UL — SIGNIFICANT CHANGE UP (ref 3.8–5.2)
RBC # FLD: 15.1 % — HIGH (ref 10.3–14.5)
SODIUM SERPL-SCNC: 130 MMOL/L — LOW (ref 135–145)
SODIUM SERPL-SCNC: 131 MMOL/L — LOW (ref 135–145)
WBC # BLD: 8.96 K/UL — SIGNIFICANT CHANGE UP (ref 3.8–10.5)
WBC # FLD AUTO: 8.96 K/UL — SIGNIFICANT CHANGE UP (ref 3.8–10.5)

## 2019-10-04 PROCEDURE — 99239 HOSP IP/OBS DSCHRG MGMT >30: CPT | Mod: GC

## 2019-10-04 PROCEDURE — 99232 SBSQ HOSP IP/OBS MODERATE 35: CPT | Mod: GC

## 2019-10-04 RX ORDER — ALBUTEROL 90 UG/1
2 AEROSOL, METERED ORAL
Qty: 0 | Refills: 0 | DISCHARGE

## 2019-10-04 RX ORDER — ASPIRIN/CALCIUM CARB/MAGNESIUM 324 MG
1 TABLET ORAL
Qty: 30 | Refills: 0
Start: 2019-10-04

## 2019-10-04 RX ORDER — FUROSEMIDE 40 MG
1 TABLET ORAL
Qty: 30 | Refills: 0
Start: 2019-10-04 | End: 2019-11-02

## 2019-10-04 RX ORDER — ATORVASTATIN CALCIUM 80 MG/1
1 TABLET, FILM COATED ORAL
Qty: 30 | Refills: 0
Start: 2019-10-04 | End: 2019-11-02

## 2019-10-04 RX ORDER — METOPROLOL TARTRATE 50 MG
3 TABLET ORAL
Qty: 90 | Refills: 0
Start: 2019-10-04 | End: 2019-11-02

## 2019-10-04 RX ORDER — HYDRALAZINE HCL 50 MG
1 TABLET ORAL
Qty: 90 | Refills: 0
Start: 2019-10-04 | End: 2019-11-02

## 2019-10-04 RX ORDER — CLOPIDOGREL BISULFATE 75 MG/1
1 TABLET, FILM COATED ORAL
Qty: 30 | Refills: 0
Start: 2019-10-04

## 2019-10-04 RX ORDER — AMLODIPINE BESYLATE 2.5 MG/1
1 TABLET ORAL
Qty: 0 | Refills: 0 | DISCHARGE

## 2019-10-04 RX ORDER — SENNA PLUS 8.6 MG/1
2 TABLET ORAL
Qty: 60 | Refills: 0
Start: 2019-10-04 | End: 2019-11-02

## 2019-10-04 RX ORDER — DOCUSATE SODIUM 100 MG
1 CAPSULE ORAL
Qty: 30 | Refills: 0
Start: 2019-10-04 | End: 2019-11-02

## 2019-10-04 RX ADMIN — HEPARIN SODIUM 5000 UNIT(S): 5000 INJECTION INTRAVENOUS; SUBCUTANEOUS at 05:20

## 2019-10-04 RX ADMIN — Medication 81 MILLIGRAM(S): at 11:15

## 2019-10-04 RX ADMIN — Medication 100 MILLIGRAM(S): at 11:15

## 2019-10-04 RX ADMIN — Medication 40 MILLIGRAM(S): at 05:20

## 2019-10-04 RX ADMIN — Medication 25 MILLIGRAM(S): at 05:27

## 2019-10-04 RX ADMIN — Medication 25 MILLIGRAM(S): at 13:43

## 2019-10-04 RX ADMIN — Medication 75 MILLIGRAM(S): at 05:26

## 2019-10-04 RX ADMIN — CLOPIDOGREL BISULFATE 75 MILLIGRAM(S): 75 TABLET, FILM COATED ORAL at 11:15

## 2019-10-04 NOTE — PROGRESS NOTE ADULT - PROVIDER SPECIALTY LIST ADULT
Cardiology
Cardiology
Heart Failure
Internal Medicine
Nephrology
Heart Failure
Internal Medicine
Nephrology
Internal Medicine
Internal Medicine

## 2019-10-04 NOTE — PROGRESS NOTE ADULT - SUBJECTIVE AND OBJECTIVE BOX
[facility] Division of Kidney Diseases & Hypertension  FOLLOW UP NOTE  [phone]--------------------------------------------------------------------------------  Chief Complaint:Non-ST elevation myocardial infarction (NSTEMI)      24 hour events/subjective:  Patient seen this morning not in significant distress. She reports no complaints today except for feeling cold. Chart reviewed. No events overnight. Patient current Na levels stable.       PAST HISTORY  --------------------------------------------------------------------------------  No significant changes to PMH, PSH, FHx, SHx, unless otherwise noted    ALLERGIES & MEDICATIONS  --------------------------------------------------------------------------------  Allergies    No Known Allergies    Intolerances      Standing Inpatient Medications  aspirin enteric coated 81 milliGRAM(s) Oral daily  atorvastatin 80 milliGRAM(s) Oral at bedtime  clopidogrel Tablet 75 milliGRAM(s) Oral daily  docusate sodium 100 milliGRAM(s) Oral daily  furosemide    Tablet 40 milliGRAM(s) Oral daily  heparin  Injectable 5000 Unit(s) SubCutaneous two times a day  hydrALAZINE 25 milliGRAM(s) Oral three times a day  metoprolol succinate ER 75 milliGRAM(s) Oral daily  senna 2 Tablet(s) Oral at bedtime    PRN Inpatient Medications  acetaminophen   Tablet .. 650 milliGRAM(s) Oral every 6 hours PRN  bisacodyl 5 milliGRAM(s) Oral every 12 hours PRN      REVIEW OF SYSTEMS  --------------------------------------------------------------------------------  Gen: No  fevers, poor appetite   Skin: No rashes  Respiratory: No dyspnea  CV: No chest pain  GI: No abdominal pain, diarrhea, nausea, vomiting  MSK: No joint pain/swelling  Neuro: No dizziness/lightheadedness, weakness    All other systems were reviewed and are negative, except as noted.      VITALS/PHYSICAL EXAM  --------------------------------------------------------------------------------  T(C): 36.8 (10-04-19 @ 04:38), Max: 37.4 (10-03-19 @ 21:40)  HR: 95 (10-04-19 @ 04:38) (80 - 95)  BP: 118/74 (10-04-19 @ 05:24) (108/68 - 126/70)  RR: 18 (10-04-19 @ 04:38) (18 - 18)  SpO2: 96% (10-04-19 @ 04:38) (96% - 96%)  Wt(kg): --        10-03-19 @ 07:01  -  10-04-19 @ 07:00  --------------------------------------------------------  IN: 1447 mL / OUT: 1100 mL / NET: 347 mL        Physical Exam:  	Gen: NAD, well-appearing  	HEENT: supple, no JVD  	Pulm: CTABL  	CV:  S1S2  	Abd: +BS, soft   	MSK: No B/L Lower ext edema  	Neuro: No focal deficits  	Skin: Warm, without rashes  	Vascular: No cyanosis    LABS/STUDIES  --------------------------------------------------------------------------------              10.8   9.52  >-----------<  208      [10-03-19 @ 09:14]              34.2     131  |  94  |  81  ----------------------------<  142      [10-04-19 @ 10:07]  4.3   |  24  |  2.19        Ca     9.6     [10-04-19 @ 10:07]      Mg     2.4     [10-04-19 @ 10:07]      Phos  3.1     [10-04-19 @ 10:07]    TPro  6.5  /  Alb  3.6  /  TBili  0.4  /  DBili  x   /  AST  28  /  ALT  34  /  AlkPhos  93  [10-03-19 @ 06:29]        Serum Osmolality 300      [10-02-19 @ 13:54]    Creatinine Trend:  SCr 2.19 [10-04 @ 10:07]  SCr 2.15 [10-04 @ 03:14]  SCr 2.02 [10-03 @ 14:27]  SCr 2.08 [10-03 @ 06:29]  SCr 2.00 [10-02 @ 19:27]    Urinalysis - [09-29-19 @ 01:33]      Color Light Yellow / Appearance Clear / SG 1.009 / pH 7.0      Gluc Trace / Ketone Negative  / Bili Negative / Urobili <2 mg/dL       Blood Negative / Protein Trace / Leuk Est Negative / Nitrite Negative      RBC  / WBC  / Hyaline  / Gran  / Sq Epi  / Non Sq Epi  / Bacteria     Urine Sodium 51      [10-02-19 @ 13:54]  Urine Osmolality 344      [10-02-19 @ 02:51]    TSH 0.37      [10-02-19 @ 05:35]

## 2019-10-04 NOTE — PROGRESS NOTE ADULT - PROBLEM SELECTOR PLAN 6
Per chart review, patient on amlodipine 5 mg q Day at home  - Hold amlodipine on discharge  - C/w Hydralazine and metoprolol on d/c  - Will d/c amlodipine in favor of metoprolol and hydralazine as outpatient.

## 2019-10-04 NOTE — PROGRESS NOTE ADULT - SUBJECTIVE AND OBJECTIVE BOX
INTERVAL EVENTS: YI    SUBJECTIVE:    ROS:  General - No fevers or chills  Cards - No chest pain or palpitations  Pulm - No cough or shortness of breath  GI - No abdominal pain, diarrhea, melena, or hematochezia. Last BM was _   - No dysuria or hematuria    OBJECTIVE:  Vitals Last 24 hrs  Vital Signs Last 24 Hrs  T(C): 36.8 (04 Oct 2019 04:38), Max: 37.4 (03 Oct 2019 21:40)  T(F): 98.3 (04 Oct 2019 04:38), Max: 99.4 (03 Oct 2019 21:40)  HR: 95 (04 Oct 2019 04:38) (80 - 95)  BP: 118/74 (04 Oct 2019 05:24) (108/68 - 126/70)  BP(mean): --  RR: 18 (04 Oct 2019 04:38) (18 - 18)  SpO2: 96% (04 Oct 2019 04:38) (96% - 96%)    IOs  I&O's Summary    03 Oct 2019 07:01  -  04 Oct 2019 07:00  --------------------------------------------------------  IN: 1447 mL / OUT: 1100 mL / NET: 347 mL        PE:  General -   Heart - Normal S1 and S2. No murmurs auscultated.  Lungs - Clear to auscultation bilaterally  GI - Abdomen soft, NT, ND.  Extremities - No lower extremity edema. Peripheral pulses intact. Calves nontender.  Skin - No rashes on extremities    LABS:  10-04    130<L>  |  95<L>  |  92<H>  ----------------------------<  109<H>  4.7   |  25  |  2.15<H>    Ca    9.4      04 Oct 2019 03:14    TPro  6.5  /  Alb  3.6  /  TBili  0.4  /  DBili  x   /  AST  28  /  ALT  34  /  AlkPhos  93  10-03                            10.8   9.52  )-----------( 208      ( 03 Oct 2019 09:14 )             34.2           IMAGING: INTERVAL EVENTS: YI    SUBJECTIVE:  She complains of being cold, but otherwise no acute complaints  No chest pain    Per grandson last night, she walked up stairs with PT and walked around the unit with nursing without any chest pain.    ROS:  General - No fevers or chills  Cards - No chest pain or palpitations  Pulm - No cough or shortness of breath  GI - No abdominal pain, diarrhea, melena, or hematochezia. Last BM was yesterday   - No dysuria or hematuria    OBJECTIVE:  Vitals Last 24 hrs  Vital Signs Last 24 Hrs  T(C): 36.8 (04 Oct 2019 04:38), Max: 37.4 (03 Oct 2019 21:40)  T(F): 98.3 (04 Oct 2019 04:38), Max: 99.4 (03 Oct 2019 21:40)  HR: 95 (04 Oct 2019 04:38) (80 - 95)  BP: 118/74 (04 Oct 2019 05:24) (108/68 - 126/70)  BP(mean): --  RR: 18 (04 Oct 2019 04:38) (18 - 18)  SpO2: 96% (04 Oct 2019 04:38) (96% - 96%)    IOs  I&O's Summary    03 Oct 2019 07:01  -  04 Oct 2019 07:00  --------------------------------------------------------  IN: 1447 mL / OUT: 1100 mL / NET: 347 mL        PE:  General - Elderly appearing woman in NAD, sitting up and breathing easily after walking back from the bathroom.  Heart - Normal S1 and S2. No murmurs auscultated.  Lungs - Clear to auscultation bilaterally  GI - Abdomen soft, NT, ND.  Extremities - No lower extremity edema. Peripheral pulses intact. Calves nontender.  Skin - No rashes on extremities    LABS:  10-04    130<L>  |  95<L>  |  92<H>  ----------------------------<  109<H>  4.7   |  25  |  2.15<H>    Ca    9.4      04 Oct 2019 03:14    TPro  6.5  /  Alb  3.6  /  TBili  0.4  /  DBili  x   /  AST  28  /  ALT  34  /  AlkPhos  93  10-03              IMAGING: No new imaging INTERVAL EVENTS: YI    SUBJECTIVE:  [place]: [id number]  She complains of being cold, but otherwise no acute complaints  No chest pain    Per grandson last night, she walked up stairs with PT and walked around the unit with nursing without any chest pain.    ROS:  General - No fevers or chills  Cards - No chest pain or palpitations  Pulm - No cough or shortness of breath  GI - No abdominal pain, diarrhea, melena, or hematochezia. Last BM was yesterday   - No dysuria or hematuria    OBJECTIVE:  Vitals Last 24 hrs  Vital Signs Last 24 Hrs  T(C): 36.8 (04 Oct 2019 04:38), Max: 37.4 (03 Oct 2019 21:40)  T(F): 98.3 (04 Oct 2019 04:38), Max: 99.4 (03 Oct 2019 21:40)  HR: 95 (04 Oct 2019 04:38) (80 - 95)  BP: 118/74 (04 Oct 2019 05:24) (108/68 - 126/70)  BP(mean): --  RR: 18 (04 Oct 2019 04:38) (18 - 18)  SpO2: 96% (04 Oct 2019 04:38) (96% - 96%)    IOs  I&O's Summary    03 Oct 2019 07:01  -  04 Oct 2019 07:00  --------------------------------------------------------  IN: 1447 mL / OUT: 1100 mL / NET: 347 mL        PE:  General - Elderly appearing woman in NAD, sitting up and breathing easily after walking back from the bathroom.  Heart - Normal S1 and S2. No murmurs auscultated.  Lungs - Clear to auscultation bilaterally  GI - Abdomen soft, NT, ND.  Extremities - No lower extremity edema. Peripheral pulses intact. Calves nontender.  Skin - No rashes on extremities    LABS:  10-04    130<L>  |  95<L>  |  92<H>  ----------------------------<  109<H>  4.7   |  25  |  2.15<H>    Ca    9.4      04 Oct 2019 03:14    TPro  6.5  /  Alb  3.6  /  TBili  0.4  /  DBili  x   /  AST  28  /  ALT  34  /  AlkPhos  93  10-03                            10.8   9.52  )-----------( 208      ( 03 Oct 2019 09:14 )             34.2       Tele: Sinus 80-90s, 1st degree av block      IMAGING: No new imaging

## 2019-10-04 NOTE — PROGRESS NOTE ADULT - NSHPATTENDINGPLANDISCUSS_GEN_ALL_CORE
medical team
Dr. Biggs

## 2019-10-04 NOTE — PROGRESS NOTE ADULT - PROBLEM SELECTOR PLAN 7
Diet: Regular diet, halal. Fluid restrict 1 L  DVT Prophylaxis: Heparin SQ  Dispo: PT recs home with assist.

## 2019-10-04 NOTE — PROGRESS NOTE ADULT - PROBLEM SELECTOR PLAN 3
Euvolemic, no crackles, on RA  - Daily standing weights and strict I's/O's.  - Hydralazine 25 TID  - Metoprolol 75 mg ER  - Lasix 40 mg po daily Euvolemic, no crackles, on RA  - Daily standing weights and strict I's/O's.  - Hydralazine 25 TID  - Metoprolol 75 mg ER  - Lasix 40 mg po daily. Will discharge on 20 mg PO daily given rising creatinine and euvolemia.

## 2019-10-04 NOTE — PROGRESS NOTE ADULT - PROBLEM SELECTOR PLAN 2
Patient current with euvolemic hyponatremia in addition to poor PO/ osmolar intake. Her sodium level has now been improving after fluid restriction and s/p hypertonic saline. Current serum Na: 120. Continue free water restriction. Continue to monitor sodium daily with urine Osm.

## 2019-10-04 NOTE — PROGRESS NOTE ADULT - PROBLEM SELECTOR PLAN 2
Euvolemic hyponatremia. Low solute intake v reset osmostat  -Holding urena  -Urine osm today  -Renal recs appreciated.  -Q12 h BMP  -Encourage PO Euvolemic hyponatremia. Low solute intake v reset osmostat  -Holding urena  -Urine osm today  -Renal recs appreciated.  -Encourage PO

## 2019-10-04 NOTE — PROGRESS NOTE ADULT - ATTENDING COMMENTS
I have seen this patient with the fellow and agree with their assessment and plan. In addition,    # WINSTON on CKD - Baseline serum creatinine 1.8. multifactorial. Contrast induced nephropathy + hemodynamic ATN. Serum creatinine stable at 2-2.2.     # Hyponatremia - initially secondary to low solute intake. Urine osm >300. Continue po furosemide to increase water clearance.     # Hypertension - Bp acceptable. Continue hydralazine and metoprolol.    # Heart failure - She appears clinically euvolemic. Continue furosemide 40mg oral daily to maintain her current volume status.     # CKD - baseline serum creatinine 1.8. Etiology of CKD unclear. UA bland. Probably secondary to microvascular disease/ hypertension.     # Medication toxicity Monitoring: medication dose reviewed. Please dose medications to eGFR<15    The rest of the recommendations as per fellow's note.    For weekend coverage, please call Dr. Steph Obrien ( fellow) and Attending Dr Bola Umanzor MD  Attending Nephrologist  [phone]

## 2019-10-04 NOTE — PROGRESS NOTE ADULT - PROBLEM SELECTOR PROBLEM 1
NSTEMI (non-ST elevated myocardial infarction)
Acute kidney injury superimposed on CKD
Essential hypertension
NSTEMI (non-ST elevated myocardial infarction)
Acute kidney injury superimposed on CKD
NSTEMI (non-ST elevated myocardial infarction)
Acute kidney injury superimposed on CKD
NSTEMI (non-ST elevated myocardial infarction)

## 2019-10-04 NOTE — PROGRESS NOTE ADULT - ASSESSMENT
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

## 2019-10-04 NOTE — PROGRESS NOTE ADULT - REASON FOR ADMISSION
NSTEMI

## 2019-10-04 NOTE — PROGRESS NOTE ADULT - PROBLEM SELECTOR PLAN 1
Patient found to have triple vessel disease on recent cath  - No chest pain on exertion or climbing stairs  - ASA  - Plavix  - Atorvastatin 80 mg q Day  - Metoprolol increased to 75 mg ER  -Hydralazine 25 mg TID   -C/w telemetry

## 2019-10-31 ENCOUNTER — NON-APPOINTMENT (OUTPATIENT)
Age: 84
End: 2019-10-31

## 2019-10-31 ENCOUNTER — APPOINTMENT (OUTPATIENT)
Dept: CARDIOLOGY | Facility: CLINIC | Age: 84
End: 2019-10-31
Payer: MEDICAID

## 2019-10-31 VITALS
HEIGHT: 59 IN | DIASTOLIC BLOOD PRESSURE: 77 MMHG | WEIGHT: 136 LBS | HEART RATE: 74 BPM | BODY MASS INDEX: 27.42 KG/M2 | OXYGEN SATURATION: 99 % | SYSTOLIC BLOOD PRESSURE: 118 MMHG

## 2019-10-31 DIAGNOSIS — Z78.9 OTHER SPECIFIED HEALTH STATUS: ICD-10-CM

## 2019-10-31 DIAGNOSIS — I10 ESSENTIAL (PRIMARY) HYPERTENSION: ICD-10-CM

## 2019-10-31 DIAGNOSIS — I50.9 HEART FAILURE, UNSPECIFIED: ICD-10-CM

## 2019-10-31 DIAGNOSIS — I21.4 NON-ST ELEVATION (NSTEMI) MYOCARDIAL INFARCTION: ICD-10-CM

## 2019-10-31 DIAGNOSIS — E78.5 HYPERLIPIDEMIA, UNSPECIFIED: ICD-10-CM

## 2019-10-31 DIAGNOSIS — I25.10 ATHEROSCLEROTIC HEART DISEASE OF NATIVE CORONARY ARTERY W/OUT ANGINA PECTORIS: ICD-10-CM

## 2019-10-31 PROCEDURE — 84443 ASSAY THYROID STIM HORMONE: CPT

## 2019-10-31 PROCEDURE — 83735 ASSAY OF MAGNESIUM: CPT

## 2019-10-31 PROCEDURE — 80053 COMPREHEN METABOLIC PANEL: CPT

## 2019-10-31 PROCEDURE — 83930 ASSAY OF BLOOD OSMOLALITY: CPT

## 2019-10-31 PROCEDURE — A9505: CPT

## 2019-10-31 PROCEDURE — 84439 ASSAY OF FREE THYROXINE: CPT

## 2019-10-31 PROCEDURE — 84300 ASSAY OF URINE SODIUM: CPT

## 2019-10-31 PROCEDURE — 93005 ELECTROCARDIOGRAM TRACING: CPT

## 2019-10-31 PROCEDURE — 93923 UPR/LXTR ART STDY 3+ LVLS: CPT

## 2019-10-31 PROCEDURE — C1887: CPT

## 2019-10-31 PROCEDURE — 97161 PT EVAL LOW COMPLEX 20 MIN: CPT

## 2019-10-31 PROCEDURE — 80061 LIPID PANEL: CPT

## 2019-10-31 PROCEDURE — C1894: CPT

## 2019-10-31 PROCEDURE — 85027 COMPLETE CBC AUTOMATED: CPT

## 2019-10-31 PROCEDURE — 86850 RBC ANTIBODY SCREEN: CPT

## 2019-10-31 PROCEDURE — 82533 TOTAL CORTISOL: CPT

## 2019-10-31 PROCEDURE — 78452 HT MUSCLE IMAGE SPECT MULT: CPT

## 2019-10-31 PROCEDURE — 85384 FIBRINOGEN ACTIVITY: CPT

## 2019-10-31 PROCEDURE — 76775 US EXAM ABDO BACK WALL LIM: CPT

## 2019-10-31 PROCEDURE — 83935 ASSAY OF URINE OSMOLALITY: CPT

## 2019-10-31 PROCEDURE — 84484 ASSAY OF TROPONIN QUANT: CPT

## 2019-10-31 PROCEDURE — 71045 X-RAY EXAM CHEST 1 VIEW: CPT

## 2019-10-31 PROCEDURE — 84145 PROCALCITONIN (PCT): CPT

## 2019-10-31 PROCEDURE — 87040 BLOOD CULTURE FOR BACTERIA: CPT

## 2019-10-31 PROCEDURE — 85576 BLOOD PLATELET AGGREGATION: CPT

## 2019-10-31 PROCEDURE — 76770 US EXAM ABDO BACK WALL COMP: CPT

## 2019-10-31 PROCEDURE — 86901 BLOOD TYPING SEROLOGIC RH(D): CPT

## 2019-10-31 PROCEDURE — 83615 LACTATE (LD) (LDH) ENZYME: CPT

## 2019-10-31 PROCEDURE — 81003 URINALYSIS AUTO W/O SCOPE: CPT

## 2019-10-31 PROCEDURE — 93000 ELECTROCARDIOGRAM COMPLETE: CPT

## 2019-10-31 PROCEDURE — 93460 R&L HRT ART/VENTRICLE ANGIO: CPT

## 2019-10-31 PROCEDURE — 97116 GAIT TRAINING THERAPY: CPT

## 2019-10-31 PROCEDURE — 82570 ASSAY OF URINE CREATININE: CPT

## 2019-10-31 PROCEDURE — 86900 BLOOD TYPING SEROLOGIC ABO: CPT

## 2019-10-31 PROCEDURE — 82553 CREATINE MB FRACTION: CPT

## 2019-10-31 PROCEDURE — C8929: CPT

## 2019-10-31 PROCEDURE — 80048 BASIC METABOLIC PNL TOTAL CA: CPT

## 2019-10-31 PROCEDURE — 85730 THROMBOPLASTIN TIME PARTIAL: CPT

## 2019-10-31 PROCEDURE — 99214 OFFICE O/P EST MOD 30 MIN: CPT

## 2019-10-31 PROCEDURE — 83036 HEMOGLOBIN GLYCOSYLATED A1C: CPT

## 2019-10-31 PROCEDURE — 87640 STAPH A DNA AMP PROBE: CPT

## 2019-10-31 PROCEDURE — 93880 EXTRACRANIAL BILAT STUDY: CPT

## 2019-10-31 PROCEDURE — 82436 ASSAY OF URINE CHLORIDE: CPT

## 2019-10-31 PROCEDURE — 81001 URINALYSIS AUTO W/SCOPE: CPT

## 2019-10-31 PROCEDURE — C1769: CPT

## 2019-10-31 PROCEDURE — 82550 ASSAY OF CK (CPK): CPT

## 2019-10-31 PROCEDURE — 87641 MR-STAPH DNA AMP PROBE: CPT

## 2019-10-31 PROCEDURE — 85610 PROTHROMBIN TIME: CPT

## 2019-10-31 PROCEDURE — 82962 GLUCOSE BLOOD TEST: CPT

## 2019-10-31 PROCEDURE — 84540 ASSAY OF URINE/UREA-N: CPT

## 2019-10-31 PROCEDURE — 83880 ASSAY OF NATRIURETIC PEPTIDE: CPT

## 2019-10-31 PROCEDURE — 84436 ASSAY OF TOTAL THYROXINE: CPT

## 2019-10-31 PROCEDURE — 84100 ASSAY OF PHOSPHORUS: CPT

## 2019-10-31 RX ORDER — FUROSEMIDE 20 MG/1
20 TABLET ORAL
Qty: 90 | Refills: 3 | Status: ACTIVE | COMMUNITY
Start: 2019-10-04 | End: 1900-01-01

## 2019-10-31 RX ORDER — ATORVASTATIN CALCIUM 80 MG/1
80 TABLET, FILM COATED ORAL
Qty: 90 | Refills: 3 | Status: ACTIVE | COMMUNITY

## 2019-10-31 RX ORDER — HYDRALAZINE HYDROCHLORIDE 25 MG/1
25 TABLET ORAL
Qty: 270 | Refills: 3 | Status: ACTIVE | COMMUNITY
Start: 2019-10-04 | End: 1900-01-01

## 2019-11-03 PROBLEM — I50.9 CONGESTIVE HEART FAILURE: Status: ACTIVE | Noted: 2019-10-31

## 2019-11-03 PROBLEM — E78.5 HYPERLIPIDEMIA: Status: ACTIVE | Noted: 2019-10-31

## 2019-11-03 PROBLEM — I10 HYPERTENSION: Status: ACTIVE | Noted: 2019-10-31

## 2019-11-03 PROBLEM — I25.10 CAD (CORONARY ARTERY DISEASE): Status: ACTIVE | Noted: 2019-10-31

## 2019-11-03 NOTE — REASON FOR VISIT
[Follow-Up - From Hospitalization] : follow-up of a recent hospitalization for [Coronary Artery Disease] : coronary artery disease [Heart Failure] : congestive heart failure [Hyperlipidemia] : hyperlipidemia [Medication Management] : Medication management [Prior Myocardial Infarction] : a prior myocardial infarction

## 2019-11-03 NOTE — HISTORY OF PRESENT ILLNESS
[FreeTextEntry1] : s/p recent admission for NSTEMI, HF\par Cath revealed MV CAD\par CTS eval deemed pt nonoperative candidate (2 opinions)\par Managed medically - now here for eval.\par \par No CP\par Mild IBARRA\par No LE edema\par No palps\par No syncope\par

## 2019-11-03 NOTE — DISCUSSION/SUMMARY
[FreeTextEntry1] : 82 y/o woman with CAD - multi vessel, not a cts surgery candidate, HFreF, CKD, HTN here for follow-up\par \par CAD  no angina\par Hf - euvolemic\par BP - controlled\par \par f/u 2-3 mo\par Refered to HF

## 2019-11-03 NOTE — PHYSICAL EXAM
[General Appearance - Well Developed] : well developed [Normal Appearance] : normal appearance [Well Groomed] : well groomed [General Appearance - Well Nourished] : well nourished [No Deformities] : no deformities [General Appearance - In No Acute Distress] : no acute distress [Normal Conjunctiva] : the conjunctiva exhibited no abnormalities [Eyelids - No Xanthelasma] : the eyelids demonstrated no xanthelasmas [Normal Oral Mucosa] : normal oral mucosa [No Oral Pallor] : no oral pallor [No Oral Cyanosis] : no oral cyanosis [Normal Jugular Venous A Waves Present] : normal jugular venous A waves present [Normal Jugular Venous V Waves Present] : normal jugular venous V waves present [No Jugular Venous Crum A Waves] : no jugular venous crum A waves [Respiration, Rhythm And Depth] : normal respiratory rhythm and effort [Exaggerated Use Of Accessory Muscles For Inspiration] : no accessory muscle use [Auscultation Breath Sounds / Voice Sounds] : lungs were clear to auscultation bilaterally [Heart Rate And Rhythm] : heart rate and rhythm were normal [Heart Sounds] : normal S1 and S2 [Murmurs] : no murmurs present [Abdomen Soft] : soft [Abdomen Tenderness] : non-tender [Abdomen Mass (___ Cm)] : no abdominal mass palpated [Abnormal Walk] : normal gait [Gait - Sufficient For Exercise Testing] : the gait was sufficient for exercise testing [Nail Clubbing] : no clubbing of the fingernails [Cyanosis, Localized] : no localized cyanosis [Petechial Hemorrhages (___cm)] : no petechial hemorrhages [Skin Color & Pigmentation] : normal skin color and pigmentation [] : no rash [No Venous Stasis] : no venous stasis [Skin Lesions] : no skin lesions [No Skin Ulcers] : no skin ulcer [No Xanthoma] : no  xanthoma was observed [Oriented To Time, Place, And Person] : oriented to person, place, and time [Affect] : the affect was normal [Mood] : the mood was normal [No Anxiety] : not feeling anxious

## 2020-01-15 ENCOUNTER — APPOINTMENT (OUTPATIENT)
Dept: HEART FAILURE | Facility: CLINIC | Age: 85
End: 2020-01-15
Payer: MEDICAID

## 2020-01-15 ENCOUNTER — NON-APPOINTMENT (OUTPATIENT)
Age: 85
End: 2020-01-15

## 2020-01-15 VITALS
OXYGEN SATURATION: 98 % | HEIGHT: 59 IN | HEART RATE: 73 BPM | BODY MASS INDEX: 27.82 KG/M2 | SYSTOLIC BLOOD PRESSURE: 149 MMHG | WEIGHT: 138 LBS | DIASTOLIC BLOOD PRESSURE: 83 MMHG | RESPIRATION RATE: 12 BRPM

## 2020-01-15 PROCEDURE — 99215 OFFICE O/P EST HI 40 MIN: CPT

## 2020-01-15 RX ORDER — FAMOTIDINE 40 MG/1
40 TABLET, FILM COATED ORAL DAILY
Refills: 0 | Status: ACTIVE | COMMUNITY
Start: 2020-01-15

## 2020-01-15 RX ORDER — METOPROLOL SUCCINATE 25 MG/1
25 TABLET, EXTENDED RELEASE ORAL DAILY
Qty: 90 | Refills: 3 | Status: ACTIVE | COMMUNITY
Start: 1900-01-01 | End: 1900-01-01

## 2020-01-15 NOTE — DISCUSSION/SUMMARY
[FreeTextEntry1] : # NYHA II ACC/AHA Stage C ischemic cardiomyopathy (EF 35%)\par - GDMT: current regimen is hydralizine 25 mg TID and metoprolol succinate 50 mg daily. Will increase metoprolol to 75 mg daily\par - Diuretic: current regimen is lasix 20 mg QOD. Continue. \par - Device: will repeat TTE when maximized on GDMT though given age and competing risks, she may not have a mortality benefit \par - Labs: 11/2019 with Cr 2.2 and LDL 65. \par \par # CAD, non-revacularized\par - no angina at present\par - continue ASA/statin\par - continue plavix until 11/2020 given NSTEMI\par \par RTC in 3-4 months after trip to [place]

## 2020-01-15 NOTE — ASSESSMENT
[FreeTextEntry1] : 82 YO F with a history of ACC/AHA Stage C ischemic cardiomyopathy (LV non-dilated, EF 35%), HTN, and CKD IV (baseline Cr 2.2) who is presenting to HF clinic for further management. She is currently doing well with NYHA II symptoms (though limited mostly by knee pain) and free of angina. She appears euvolemic on exam.

## 2020-01-15 NOTE — HISTORY OF PRESENT ILLNESS
[FreeTextEntry1] : 84 YO F with a history of ACC/AHA Stage C ischemic cardiomyopathy (LV non-dilated, EF 35%), HTN, and CKD IV (baseline Cr 2.2) who is presenting to HF clinic for further management.\par \par She presented with NSTEMI 11/2019 and found to have severe 3v CAD with myocardial viability. She was deemed not to be a suitable candidate for surgical revascularization due to risk or percutaneous revascularization due to anatomy. She is therefore being medically managed. \par \par She has felt well and denies subsequent ER visits of hospitalizations. She is not very active and primarily limited by knee pain. When she does ambulate she denies chest pain or dyspnea. Denies any lower extremity edema. Denies orthopnea. Weights have been stable at home. Denies palpitations. Denies dizziness or lightheadedness. \par \par \par \par

## 2020-01-15 NOTE — PHYSICAL EXAM
[Normal Conjunctiva] : the conjunctiva exhibited no abnormalities [Normal Oral Mucosa] : normal oral mucosa [Normal Oropharynx] : normal oropharynx [Heart Rate And Rhythm] : heart rate and rhythm were normal [Heart Sounds] : normal S1 and S2 [Murmurs] : no murmurs present [Bowel Sounds] : normal bowel sounds [Respiration, Rhythm And Depth] : normal respiratory rhythm and effort [Abdomen Soft] : soft [Abdomen Tenderness] : non-tender [Nail Clubbing] : no clubbing of the fingernails [Cyanosis, Localized] : no localized cyanosis [] : no rash [Oriented To Time, Place, And Person] : oriented to person, place, and time [Impaired Insight] : insight and judgment were intact [Affect] : the affect was normal [FreeTextEntry1] : No edema

## 2020-01-24 ENCOUNTER — RX RENEWAL (OUTPATIENT)
Age: 85
End: 2020-01-24

## 2020-01-24 RX ORDER — CLOPIDOGREL BISULFATE 75 MG/1
75 TABLET, FILM COATED ORAL
Qty: 90 | Refills: 3 | Status: ACTIVE | COMMUNITY
Start: 2020-01-24 | End: 1900-01-01

## 2020-06-08 NOTE — PROGRESS NOTE ADULT - SUBJECTIVE AND OBJECTIVE BOX
NARAYAN ASHRAFTRACEY  83y  Female      Subjective:     No acute overnight events. This am, patient states she feels much better. She denies f/c/n/v/d/CP/SOB.      Meds    MEDICATIONS  (STANDING):  aspirin enteric coated 81 milliGRAM(s) Oral daily  atorvastatin 80 milliGRAM(s) Oral at bedtime  furosemide    Tablet 40 milliGRAM(s) Oral daily  heparin  Infusion. 850 Unit(s)/Hr (8.5 mL/Hr) IV Continuous <Continuous>  hydrALAZINE 37.5 milliGRAM(s) Oral three times a day    MEDICATIONS  (PRN):  heparin  Injectable 3800 Unit(s) IV Push every 6 hours PRN For aPTT less than 40        Vital Signs Last 24 Hrs  T(C): 36.9 (26 Sep 2019 13:00), Max: 36.9 (26 Sep 2019 13:00)  T(F): 98.4 (26 Sep 2019 13:00), Max: 98.4 (26 Sep 2019 13:00)  HR: 87 (26 Sep 2019 13:00) (87 - 101)  BP: 96/66 (26 Sep 2019 13:00) (96/66 - 124/83)  BP(mean): --  RR: 17 (26 Sep 2019 13:00) (17 - 17)  SpO2: 95% (26 Sep 2019 13:00) (95% - 98%)    PHYSICAL EXAM:  GENERAL: NAD, well-groomed, well-developed, eating salmon at bedside  HEENT - NC/AT, pupils equal and reactive to light  NECK: Supple, No JVD  CHEST/LUNG: Clear to auscultation bilaterally; No rales, rhonchi, wheezing  HEART: Regular rate and rhythm; No murmurs, rubs, or gallops  ABDOMEN: Soft, Nontender, Nondistended; Bowel sounds present  EXTREMITIES:  2+ Peripheral Pulses, No clubbing, cyanosis, or edema  NEURO:  No Focal deficits, sensory and motor intact  SKIN: No rashes or lesions    Consultant(s) Notes Reviewed:  [x ] YES  [ ] NO  Care Discussed with Consultants/Other Providers [ x] YES  [ ] NO    LABS:                              14.9   14.0  )-----------( 262      ( 26 Sep 2019 10:01 )             47.5       09-26    129<L>  |  89<L>  |  48<H>  ----------------------------<  129<H>  3.7   |  23  |  2.49<H>    Ca    9.3      26 Sep 2019 10:01  Phos  4.3       Mg     2.3         TPro  7.9  /  Alb  4.0  /  TBili  0.6  /  DBili  x   /  AST  31  /  ALT  32  /  AlkPhos  117                Urinalysis Basic - ( 24 Sep 2019 20:59 )    Color: Colorless / Appearance: Clear / S.010 / pH: x  Gluc: x / Ketone: Negative  / Bili: Negative / Urobili: Negative   Blood: x / Protein: Trace / Nitrite: Negative   Leuk Esterase: Negative / RBC: 3 /hpf / WBC 4 /HPF   Sq Epi: x / Non Sq Epi: 1 /hpf / Bacteria: Negative        PTT - ( 26 Sep 2019 10:01 )  PTT:48.9 sec    Lactate Trend      CARDIAC MARKERS ( 26 Sep 2019 06:24 )  x     / x     / 55 U/L / x     / 5.0 ng/mL  CARDIAC MARKERS ( 25 Sep 2019 01:14 )  x     / x     / 102 U/L / x     / 7.6 ng/mL        CAPILLARY BLOOD GLUCOSE            Culture - Blood (collected 19 @ 21:10)  Source: .Blood  Preliminary Report (19 @ 22:01):    No growth to date.    Culture - Blood (collected 19 @ 21:10)  Source: .Blood  Preliminary Report (19 @ 22:01):    No growth to date.          RADIOLOGY & ADDITIONAL TESTS: [Alert] : alert [No Acute Distress] : no acute distress [Normocephalic] : normocephalic [Anterior [place] Closed] : anterior fontanelle closed [Red Reflex Bilateral] : red reflex bilateral [PERRL] : PERRL [Normally Placed Ears] : normally placed ears [Auricles Well Formed] : auricles well formed [Clear Tympanic membranes with present light reflex and bony landmarks] : clear tympanic membranes with present light reflex and bony landmarks [No Discharge] : no discharge [Nares Patent] : nares patent [Palate Intact] : palate intact [Tooth Eruption] : tooth eruption  [Uvula Midline] : uvula midline [Supple, full passive range of motion] : supple, full passive range of motion [No Palpable Masses] : no palpable masses [Symmetric Chest Rise] : symmetric chest rise [Clear to Auscultation Bilaterally] : clear to auscultation bilaterally [Regular Rate and Rhythm] : regular rate and rhythm [S1, S2 present] : S1, S2 present [No Murmurs] : no murmurs [+2 Femoral Pulses] : +2 femoral pulses [Soft] : soft [NonTender] : non tender [Non Distended] : non distended [Normoactive Bowel Sounds] : normoactive bowel sounds [No Hepatomegaly] : no hepatomegaly [No Splenomegaly] : no splenomegaly [No Clitoromegaly] : no clitoromegaly [Mckay 1] : Mckay 1 [Normal Vaginal Introitus] : normal vaginal introitus [Patent] : patent [Normally Placed] : normally placed [No Abnormal Lymph Nodes Palpated] : no abnormal lymph nodes palpated [No Clavicular Crepitus] : no clavicular crepitus [Negative Paulson-Ortalani] : negative Paulson-Ortalani [Symmetric Buttocks Creases] : symmetric buttocks creases [No Spinal Dimple] : no spinal dimple [NoTuft of Hair] : no tuft of hair [Cranial Nerves Grossly Intact] : cranial nerves grossly intact [No Rash or Lesions] : no rash or lesions

## 2023-12-07 NOTE — PATIENT PROFILE ADULT - NSPROMUTANXFEARADDRESSFT_GEN_A_NUR
What Is The Reason For Today's Visit?: History of Melanoma
What Stage Is The Melanoma?: Stage 0
Year Excised?: 9/2023
none

## 2025-01-27 NOTE — H&P ADULT - NSHPSOURCEINFORD_GEN_ALL_CORE
previous_has_had_previous_treatments
When Outside In The Sun, Do You...: mostly burns, rarely tans
Number Of Treatments: 2
Chart(s)/Patient/Other Family Member